# Patient Record
Sex: MALE | Race: WHITE | NOT HISPANIC OR LATINO | Employment: OTHER | URBAN - NONMETROPOLITAN AREA
[De-identification: names, ages, dates, MRNs, and addresses within clinical notes are randomized per-mention and may not be internally consistent; named-entity substitution may affect disease eponyms.]

---

## 2017-03-28 ENCOUNTER — TELEPHONE (OUTPATIENT)
Dept: CARDIOLOGY | Facility: CLINIC | Age: 57
End: 2017-03-28

## 2017-03-28 RX ORDER — SIMVASTATIN 40 MG
TABLET ORAL
Qty: 90 TABLET | Refills: 1 | Status: SHIPPED | OUTPATIENT
Start: 2017-03-28 | End: 2018-07-10 | Stop reason: SDUPTHER

## 2017-03-28 RX ORDER — CARVEDILOL 6.25 MG/1
6.25 TABLET ORAL 2 TIMES DAILY WITH MEALS
Qty: 30 TABLET | Refills: 6 | Status: SHIPPED | OUTPATIENT
Start: 2017-03-28 | End: 2018-07-10 | Stop reason: SDUPTHER

## 2017-03-28 NOTE — TELEPHONE ENCOUNTER
Received call from [facility] requesting refills on simvastatin 40 mg daily and coreg 6.25 mg bid, script sent into pharmacy.

## 2017-06-28 ENCOUNTER — OFFICE VISIT (OUTPATIENT)
Dept: CARDIOLOGY | Facility: CLINIC | Age: 57
End: 2017-06-28

## 2017-06-28 VITALS
DIASTOLIC BLOOD PRESSURE: 70 MMHG | HEART RATE: 68 BPM | WEIGHT: 197 LBS | SYSTOLIC BLOOD PRESSURE: 144 MMHG | BODY MASS INDEX: 31.66 KG/M2 | HEIGHT: 66 IN

## 2017-06-28 DIAGNOSIS — E78.00 HYPERCHOLESTEREMIA: ICD-10-CM

## 2017-06-28 DIAGNOSIS — I25.10 CORONARY ARTERY DISEASE INVOLVING NATIVE CORONARY ARTERY OF NATIVE HEART WITHOUT ANGINA PECTORIS: Primary | ICD-10-CM

## 2017-06-28 DIAGNOSIS — R01.1 MURMUR, CARDIAC: ICD-10-CM

## 2017-06-28 DIAGNOSIS — I10 ESSENTIAL HYPERTENSION: ICD-10-CM

## 2017-06-28 PROCEDURE — 99213 OFFICE O/P EST LOW 20 MIN: CPT | Performed by: NURSE PRACTITIONER

## 2017-06-28 NOTE — PROGRESS NOTES
Chief Complaint   Patient presents with   • Follow-up     6 month follow-up, labs and medication refills per PCP.       Subjective       Sawyer Tilley is a 57 y.o. male  with a history of ischemic heart disease for which he underwent bypass surgery in 2011. His last cardiac workup done in 2014 showed only a small inferior infarct.   Today he comes to the office for a follow up appointment and denies cardiac symptoms. He has been doing a lot of mowing and yard work without problem other than developed some left knee pain. He has issues with left leg feeling numb at times which is not a new symptom and attributes to back problems by history. No medication changes reported.     HPI         Cardiac History:    Past Surgical History:   Procedure Laterality Date   • CARDIOVASCULAR STRESS TEST  11/03/2011    Stress-4min, 41sec, 84%THR, 160/86. Inferior Ischemia   • CARDIOVASCULAR STRESS TEST  09/30/2014    [facility]-no ischemia, small fixed inferior infarct   • CATH LAB PROCEDURE  01/08/2011    Cath-60%LAD, 90%D1, 75%LCX, 80%OM. 100%RCA.   • CONVERTED (HISTORICAL) CARDIOVASCULAR STUDIES  01/10/2011    LIMA-LAD, SVG-OM, SVG-PDA, & PHYLLIS to Dr. Valdez   • CORONARY ARTERY BYPASS GRAFT  01/10/2011    LIMA-LAD, SVG-OM, SVG-PDA. PHYLLIS- D1   • ECHO - CONVERTED  11/03/2011    Echo-EF 65/70%   • ECHO - CONVERTED  09/30/2004    Echo-EF 65%, mild MR, borderline pulm HTN   • OTHER SURGICAL HISTORY  01/09/2011     Carotid US- No sig. stenosis.        Current Outpatient Prescriptions   Medication Sig Dispense Refill   • acetaminophen (TYLENOL) 500 MG tablet Take 500 mg by mouth every 6 (six) hours as needed for mild pain (1-3).     • aspirin 325 MG tablet Take 325 mg by mouth daily.     • carvedilol (COREG) 6.25 MG tablet Take 1 tablet by mouth 2 (Two) Times a Day With Meals. 30 tablet 6   • fluticasone (FLONASE) 50 MCG/ACT nasal spray 2 sprays into each nostril Daily.     • isosorbide mononitrate (IMDUR) 30 MG 24 hr tablet TAKE 1 TABLET  ONE TIME DAILY IN THE MORNING 90 tablet 3   • loratadine (CLARITIN) 10 MG tablet Take 10 mg by mouth Daily.     • omeprazole (PriLOSEC) 20 MG capsule TAKE 1 CAPSULE ONE TIME DAILY 90 capsule 0   • simvastatin (ZOCOR) 40 MG tablet One tablet by mouth daily 90 tablet 1     No current facility-administered medications for this visit.        Keflex [cephalexin]    Past Medical History:   Diagnosis Date   • Anemia     Acute blood loss Anemia   • Back pain     Chronic   • Dyslipidemia    • GERD (gastroesophageal reflux disease)    • H pylori ulcer     Positive   • Hypertension    • IHD (ischemic heart disease)     s/p CABG X 4-V   • Sleep apnea     wears CPAP   • Thrombocytopenia    • Vitamin D deficiency        Social History     Social History   • Marital status:      Spouse name: N/A   • Number of children: N/A   • Years of education: N/A     Occupational History   • Not on file.     Social History Main Topics   • Smoking status: Current Every Day Smoker     Packs/day: 0.50     Types: Cigarettes   • Smokeless tobacco: Never Used      Comment: patient reports has cut back on cigarette smoking, not ready to quit yet, counseled patient on effect's of smoking on health.   • Alcohol use No   • Drug use: No   • Sexual activity: Not on file     Other Topics Concern   • Not on file     Social History Narrative       Family History   Problem Relation Age of Onset   • Hypertension Mother    • Alzheimer's disease Mother    • Hypertension Father    • Hypertension Other        Review of Systems   Constitution: Negative for decreased appetite, diaphoresis, weakness and malaise/fatigue.   HENT: Positive for congestion (mild, relates to allergies). Negative for nosebleeds.    Eyes: Positive for blurred vision (reports a few episodes, lasting several minutes).   Cardiovascular: Negative for chest pain, claudication, dyspnea on exertion, leg swelling, near-syncope and palpitations.   Respiratory: Negative for shortness of breath  "and sleep disturbances due to breathing (uses CPAP).    Endocrine: Negative for polydipsia, polyphagia and polyuria.   Hematologic/Lymphatic: Negative for bleeding problem. Does not bruise/bleed easily.   Musculoskeletal: Positive for arthritis and back pain. Negative for muscle weakness.   Gastrointestinal: Positive for flatus. Negative for abdominal pain, dysphagia, heartburn, melena and nausea.   Genitourinary: Negative for dysuria and hematuria.   Neurological: Negative for difficulty with concentration and light-headedness.   Psychiatric/Behavioral: The patient does not have insomnia and is not nervous/anxious.         Diabetes- No  Thyroid-normal    Objective     /70 (BP Location: Left arm)  Pulse 68  Ht 66\" (167.6 cm)  Wt 197 lb (89.4 kg)  BMI 31.8 kg/m2    Physical Exam   Constitutional: He is oriented to person, place, and time.   HENT:   Head: Normocephalic.   Eyes: Conjunctivae are normal. Pupils are equal, round, and reactive to light.   Neck: Neck supple. No JVD present. No thyromegaly present.   Cardiovascular: Normal rate and regular rhythm.    Murmur heard.   Systolic murmur is present with a grade of 2/6   Pulses:       Radial pulses are 3+ on the right side, and 3+ on the left side.   Slightly loud S2   Pulmonary/Chest: Effort normal and breath sounds normal.   Abdominal: Soft. Bowel sounds are normal. He exhibits no distension. There is no tenderness. There is no guarding.   Musculoskeletal: He exhibits no edema.   Neurological: He is alert and oriented to person, place, and time.   Skin: Skin is warm and dry. No rash noted. Nails show no clubbing.   Psychiatric: He has a normal mood and affect. His behavior is normal. Judgment and thought content normal.   Vitals reviewed.     Procedures          Assessment/Plan      Sawyer was seen today for follow-up.    Diagnoses and all orders for this visit:    Coronary artery disease involving native coronary artery of native heart without angina " pectoris    Hypercholesteremia    Essential hypertension    Murmur, cardiac      Mr. Tilley will follow with you for management of labs and medication refills. December lab report showed slight increase in glucose for which he was advised to avoid high carbohydrate diet. His blood pressure and heart rate are stable. He denies cardiac chest pain or palpitations. He does admit to episode of mild epigastric RUQ tenderness and increase flatulence recently. If symptoms reoccur I advised him to follow up with you. His BMI remains > 30, weight loss recommended. No cardiac testing advised at this time. We will see him back in 6 months or sooner for problems.              Electronically signed by KATHLEEN Mustafa,  June 28, 2017 3:21 PM

## 2018-01-08 ENCOUNTER — OFFICE VISIT (OUTPATIENT)
Dept: CARDIOLOGY | Facility: CLINIC | Age: 58
End: 2018-01-08

## 2018-01-08 VITALS
WEIGHT: 203 LBS | HEIGHT: 66 IN | SYSTOLIC BLOOD PRESSURE: 150 MMHG | BODY MASS INDEX: 32.62 KG/M2 | HEART RATE: 60 BPM | DIASTOLIC BLOOD PRESSURE: 70 MMHG

## 2018-01-08 DIAGNOSIS — I10 ESSENTIAL HYPERTENSION: ICD-10-CM

## 2018-01-08 DIAGNOSIS — I25.10 CORONARY ARTERY DISEASE INVOLVING NATIVE CORONARY ARTERY OF NATIVE HEART WITHOUT ANGINA PECTORIS: Primary | ICD-10-CM

## 2018-01-08 DIAGNOSIS — Z72.0 TOBACCO ABUSE: ICD-10-CM

## 2018-01-08 DIAGNOSIS — E78.00 HYPERCHOLESTEREMIA: ICD-10-CM

## 2018-01-08 DIAGNOSIS — R01.1 MURMUR, CARDIAC: ICD-10-CM

## 2018-01-08 PROCEDURE — 99213 OFFICE O/P EST LOW 20 MIN: CPT | Performed by: NURSE PRACTITIONER

## 2018-01-08 NOTE — PROGRESS NOTES
Chief Complaint   Patient presents with   • Follow-up     For cardiac management. He states had one episode after mowing lawn and weed eating, he had nausea, vomited, had vision disturbance, and felt like needle in arms. He reports had not ate prior to working. He reports having joint tightness and pain, PCP stopped Zocor for 3-4 days and he has continued with pain in joints. PCP writes refills on medication. No recent labs. Patient verbalized current medication.       Cardiac Complaints  none      Subjective   Sawyer Tilley is a 57 y.o. male with HTN, hyperlipidemia, and  ischemic heart disease for which he underwent bypass surgery in 2011. His last cardiac workup done in 2014 showed only a small inferior infarct.  He returns today for follow up and reports one issue after mowing the lawn over the summer where he felt nauseous, vomited, and felt like he could not see.  He also reports that he had a sensation of needle pricks down his left arm.  Patient states before he was out in the heat that day he had not eaten and feels like it was a contributing factor.  He denies any repeat incidences since then.  He does report having joint tightness and pain and states he had stopped his zocor.  The pain did not go away and he restarted the medication.  No refills are currently needed as he reports with PCP.  No recent labs have been done.  Unfortunately, he has not been able to quit smoking and is still smoking about a pack of cigarettes daily.          Cardiac History  Past Surgical History:   Procedure Laterality Date   • CARDIOVASCULAR STRESS TEST  11/03/2011    Stress-4min, 41sec, 84%THR, 160/86. Inferior Ischemia   • CARDIOVASCULAR STRESS TEST  09/30/2014    [facility]-no ischemia, small fixed inferior infarct   • CATH LAB PROCEDURE  01/08/2011    Cath-60%LAD, 90%D1, 75%LCX, 80%OM. 100%RCA.   • CONVERTED (HISTORICAL) CARDIOVASCULAR STUDIES  01/10/2011    LIMA-LAD, SVG-OM, SVG-PDA, & PHYLLIS to Dr. Valdez   • CORONARY  ARTERY BYPASS GRAFT  01/10/2011    LIMA-LAD, SVG-OM, SVG-PDA. PHYLLIS- D1   • ECHO - CONVERTED  11/03/2011    Echo-EF 65/70%   • ECHO - CONVERTED  09/30/2004    Echo-EF 65%, mild MR, borderline pulm HTN   • OTHER SURGICAL HISTORY  01/09/2011     Carotid US- No sig. stenosis.        Current Outpatient Prescriptions   Medication Sig Dispense Refill   • acetaminophen (TYLENOL) 500 MG tablet Take 500 mg by mouth every 6 (six) hours as needed for mild pain (1-3).     • aspirin 325 MG tablet Take 325 mg by mouth daily.     • carvedilol (COREG) 6.25 MG tablet Take 1 tablet by mouth 2 (Two) Times a Day With Meals. 30 tablet 6   • fluticasone (FLONASE) 50 MCG/ACT nasal spray 2 sprays into each nostril Daily.     • isosorbide mononitrate (IMDUR) 30 MG 24 hr tablet TAKE 1 TABLET ONE TIME DAILY IN THE MORNING 90 tablet 3   • loratadine (CLARITIN) 10 MG tablet Take 10 mg by mouth Daily.     • omeprazole (PriLOSEC) 20 MG capsule TAKE 1 CAPSULE ONE TIME DAILY 90 capsule 0   • simvastatin (ZOCOR) 40 MG tablet One tablet by mouth daily 90 tablet 1     No current facility-administered medications for this visit.        Keflex [cephalexin]    Past Medical History:   Diagnosis Date   • Anemia     Acute blood loss Anemia   • Back pain     Chronic   • Dyslipidemia    • GERD (gastroesophageal reflux disease)    • H pylori ulcer     Positive   • Hypertension    • IHD (ischemic heart disease)     s/p CABG X 4-V   • Sleep apnea     wears CPAP   • Thrombocytopenia    • Vitamin D deficiency        Social History     Social History   • Marital status:      Spouse name: N/A   • Number of children: N/A   • Years of education: N/A     Occupational History   • Not on file.     Social History Main Topics   • Smoking status: Current Every Day Smoker     Packs/day: 1.00     Types: Cigarettes   • Smokeless tobacco: Never Used      Comment: patient reports has cut back on cigarette smoking, not ready to quit yet, counseled patient on effect's of  "smoking on health.   • Alcohol use No   • Drug use: No   • Sexual activity: Not on file     Other Topics Concern   • Not on file     Social History Narrative       Family History   Problem Relation Age of Onset   • Hypertension Mother    • Alzheimer's disease Mother    • Hypertension Father    • Alzheimer's disease Father    • Hypertension Other        Review of Systems   Constitution: Negative for weakness and malaise/fatigue.   Cardiovascular: Negative for chest pain, dyspnea on exertion, near-syncope, palpitations and syncope.   Respiratory: Negative for shortness of breath and wheezing.    Musculoskeletal: Positive for joint pain. Negative for joint swelling.   Gastrointestinal: Negative for anorexia and heartburn.   Genitourinary: Negative for dysuria, hematuria and nocturia.   Neurological: Negative for dizziness, light-headedness and loss of balance.   Psychiatric/Behavioral: Negative for altered mental status and depression.       DiabetesNo  Thyroidnormal    Objective     /70 (BP Location: Right arm)  Pulse 60  Ht 167.6 cm (65.98\")  Wt 92.1 kg (203 lb)  BMI 32.78 kg/m2    Physical Exam   Constitutional: He is oriented to person, place, and time. He appears well-developed and well-nourished.   HENT:   Head: Normocephalic and atraumatic.   Eyes: EOM are normal. Pupils are equal, round, and reactive to light.   Neck: Normal range of motion. Neck supple.   Cardiovascular: Normal rate and regular rhythm.    Murmur heard.  Pulmonary/Chest: Effort normal and breath sounds normal.   Abdominal: Soft.   Musculoskeletal: Normal range of motion.   Neurological: He is alert and oriented to person, place, and time.   Skin: Skin is warm and dry.   Psychiatric: He has a normal mood and affect. His behavior is normal.       Procedures    Assessment/Plan     HR is stable today.  BP is elevated at 150/70.  He reports at home it has been very well controlled.  He was advised to keep a log as he has a bleed pressure " machine at the house, if it remains elevated, he was advised to call for further recommendations.  He does admit to one episode of nausea, diaphoresis, and left arm tingling after mowing in the summer but reported it never returned, we did advise on repeat cardiac workup since these are anginal equivalents, but he declined as the symptoms only occurred once, and he related to lack of food.  If any symptoms should return, he was advised to call for further recommendations.  No recent labs have been done, order provided with recommendations to follow once complete.  No refills are needed.  Good cardiac diet and activity as tolerated advised.  Long discussion with him in regards to his smoking.  He talked about different modalities to quit and benefits of cessation for over 3 minutes. He currently does not want any medication and states he just is not ready yet to quit.  6 month follow up scheduled with patient encouraged to call with concerns.      Problems Addressed this Visit        Cardiovascular and Mediastinum    Coronary artery disease involving native coronary artery of native heart without angina pectoris - Primary    Relevant Orders    CBC & Differential    Comprehensive Metabolic Panel    Lipid Panel    TSH    Hypercholesteremia    Relevant Orders    CBC & Differential    Comprehensive Metabolic Panel    Lipid Panel    TSH    Essential hypertension    Relevant Orders    CBC & Differential    Comprehensive Metabolic Panel    Lipid Panel    TSH    Murmur, cardiac      Other Visit Diagnoses     Tobacco abuse                      Electronically signed by KATHLEEN Márquez January 8, 2018 4:23 PM

## 2018-01-12 ENCOUNTER — TELEPHONE (OUTPATIENT)
Dept: CARDIOLOGY | Facility: CLINIC | Age: 58
End: 2018-01-12

## 2018-01-12 RX ORDER — FENOFIBRATE 145 MG/1
145 TABLET, COATED ORAL DAILY
Qty: 90 TABLET | Refills: 3 | Status: SHIPPED | OUTPATIENT
Start: 2018-01-12 | End: 2018-07-10 | Stop reason: SDUPTHER

## 2018-01-12 NOTE — TELEPHONE ENCOUNTER
----- Message from Raven Valenzuela LPN sent at 1/11/2018  5:02 PM EST -----  Called, left message to call back with person who answered.

## 2018-01-12 NOTE — TELEPHONE ENCOUNTER
Patient notified of lab results and recommendation's for Tricor 145mg daily. Patient verbalizes understanding and request that script be sent into [facility]. Script sent.

## 2018-07-06 NOTE — PROGRESS NOTES
Chief Complaint   Patient presents with   • Follow-up     For cardiac management. Patient is on aspirin. Reports that he does sweat a lot, is wondering if it could be Imdur. Last lab work was done on 01/11/18 per you, in chart under media.    • Med Refill     Needs refills on cardiac medications. 90 day supplys to [facility] in [place].       Cardiac Complaints  none      Subjective   Sawyer Tilley is a 58 y.o. male with HTN, hyperlipidemia, and  ischemic heart disease for which he underwent bypass surgery in 2011. His last cardiac workup done in 2014 showed only a small inferior infarct.  At last visit, repeat cardiac workup was recommended for angina equivalent but patient declined as he states symptoms never returned after one time.  Patient comes today for follow up and denies cardiac concerns.  He does admit to diaphoresis that is present most days and all day.  He says with the increase of sweating he does not have chest pain, shortness of breath, or nausea associated with it.  No recent labs have been done, order requested.  Refills of cardiac meds requested as well. Unfortunately, he has not been able to quit smoking.      Cardiac History  Past Surgical History:   Procedure Laterality Date   • CARDIOVASCULAR STRESS TEST  11/03/2011    Stress-4min, 41sec, 84%THR, 160/86. Inferior Ischemia   • CARDIOVASCULAR STRESS TEST  09/30/2014    [facility]-no ischemia, small fixed inferior infarct   • CATH LAB PROCEDURE  01/08/2011    Cath-60%LAD, 90%D1, 75%LCX, 80%OM. 100%RCA.   • CONVERTED (HISTORICAL) CARDIOVASCULAR STUDIES  01/10/2011    LIMA-LAD, SVG-OM, SVG-PDA, & PHYLLIS to Dr. Valdez   • CORONARY ARTERY BYPASS GRAFT  01/10/2011    LIMA-LAD, SVG-OM, SVG-PDA. PHYLLIS- D1   • ECHO - CONVERTED  11/03/2011    Echo-EF 65/70%   • ECHO - CONVERTED  09/30/2004    Echo-EF 65%, mild MR, borderline pulm HTN   • OTHER SURGICAL HISTORY  01/09/2011     Carotid US- No sig. stenosis.        Current Outpatient Prescriptions   Medication  Sig Dispense Refill   • acetaminophen (TYLENOL) 500 MG tablet Take 500 mg by mouth every 6 (six) hours as needed for mild pain (1-3).     • aspirin 325 MG tablet Take 325 mg by mouth daily.     • carvedilol (COREG) 6.25 MG tablet Take 1 tablet by mouth 2 (Two) Times a Day With Meals. 180 tablet 3   • fenofibrate (TRICOR) 145 MG tablet Take 1 tablet by mouth Daily. 90 tablet 3   • fluticasone (FLONASE) 50 MCG/ACT nasal spray 2 sprays into each nostril Daily.     • isosorbide mononitrate (IMDUR) 30 MG 24 hr tablet Take 1 tablet by mouth Daily. 90 tablet 3   • loratadine (CLARITIN) 10 MG tablet Take 10 mg by mouth Daily.     • omeprazole (priLOSEC) 20 MG capsule Take 1 capsule by mouth Daily. 90 capsule 3   • simvastatin (ZOCOR) 40 MG tablet One tablet by mouth daily 90 tablet 3     No current facility-administered medications for this visit.        Keflex [cephalexin]    Past Medical History:   Diagnosis Date   • Anemia     Acute blood loss Anemia   • Back pain     Chronic   • Dyslipidemia    • GERD (gastroesophageal reflux disease)    • H pylori ulcer     Positive   • Hypertension    • IHD (ischemic heart disease)     s/p CABG X 4-V   • Sleep apnea     wears CPAP   • Thrombocytopenia (CMS/HCC)    • Vitamin D deficiency        Social History     Social History   • Marital status:      Spouse name: N/A   • Number of children: N/A   • Years of education: N/A     Occupational History   • Not on file.     Social History Main Topics   • Smoking status: Current Every Day Smoker     Packs/day: 1.00     Types: Cigarettes   • Smokeless tobacco: Never Used      Comment: patient reports has cut back on cigarette smoking, not ready to quit yet, counseled patient on effect's of smoking on health.   • Alcohol use No   • Drug use: No   • Sexual activity: Not on file     Other Topics Concern   • Not on file     Social History Narrative   • No narrative on file       Family History   Problem Relation Age of Onset   •  "Hypertension Mother    • Alzheimer's disease Mother    • Hypertension Father    • Alzheimer's disease Father    • Hypertension Other        Review of Systems   Constitution: Positive for diaphoresis. Negative for weakness and malaise/fatigue.   Cardiovascular: Negative for chest pain, dyspnea on exertion, irregular heartbeat, near-syncope, orthopnea, palpitations and syncope.   Respiratory: Negative for shortness of breath and wheezing.    Musculoskeletal: Negative for back pain, joint pain and joint swelling.   Gastrointestinal: Negative for anorexia, heartburn, nausea and vomiting.   Genitourinary: Negative for dysuria, hematuria, hesitancy and nocturia.   Neurological: Negative for dizziness, light-headedness and loss of balance.   Psychiatric/Behavioral: Negative for depression and memory loss. The patient is not nervous/anxious.            Objective     /78 (BP Location: Left arm)   Pulse 60   Ht 167.6 cm (65.98\")   Wt 93 kg (205 lb)   BMI 33.10 kg/m²     Physical Exam   Constitutional: He is oriented to person, place, and time. He appears well-developed and well-nourished.   HENT:   Head: Normocephalic and atraumatic.   Eyes: EOM are normal. Pupils are equal, round, and reactive to light.   Neck: Normal range of motion. Neck supple.   Cardiovascular: Normal rate and regular rhythm.    Murmur heard.  Pulmonary/Chest: Effort normal and breath sounds normal.   Abdominal: Soft.   Musculoskeletal: Normal range of motion.   Neurological: He is alert and oriented to person, place, and time.   Skin: Skin is warm and dry.   Psychiatric: He has a normal mood and affect.       Procedures    Assessment/Plan     HR and BP are stable today.  HTN well managed on current regimen.  For workup of diaphoresis, lab order will be provided.  FLP added to labs to assess for efficacy of statin medication for hyperlipidemia management along with tricor therapy. Chest xray will also be advised with his tobacco abuse to rule " out any possible malignancy.  More recommendations to follow.  No new cardiac workup will be advised at this time as he denies any cardiac concerns and is very active at home without problems. ASA continued for history of IHD. Smoking cessation once again discussed for continued tobacco abuse but he is not ready to quit at present.  He does report down the road he may stop due to costs.  BMI elevated at 33.10 good cardiac diet with limited caloric intake, saturated fat, and cholesterol advised.       Problems Addressed this Visit        Cardiovascular and Mediastinum    Coronary artery disease involving native coronary artery of native heart without angina pectoris    Relevant Medications    isosorbide mononitrate (IMDUR) 30 MG 24 hr tablet    carvedilol (COREG) 6.25 MG tablet    Other Relevant Orders    XR Chest 2 View    TSH    T4    T3    Comprehensive Metabolic Panel    CBC & Differential    Testosterone    Hypercholesteremia    Relevant Medications    simvastatin (ZOCOR) 40 MG tablet    fenofibrate (TRICOR) 145 MG tablet    Other Relevant Orders    TSH    T4    T3    Comprehensive Metabolic Panel    CBC & Differential    Testosterone      Other Visit Diagnoses     Diaphoresis    -  Primary    Relevant Orders    XR Chest 2 View    TSH    T4    T3    Comprehensive Metabolic Panel    CBC & Differential    Testosterone    Sedimentation Rate    CAD of autologous artery bypass graft without angina        Relevant Orders    XR Chest 2 View    TSH    T4    T3    Comprehensive Metabolic Panel    CBC & Differential    Testosterone    Other fatigue        Relevant Orders    TSH    T4    T3    Comprehensive Metabolic Panel    CBC & Differential    Testosterone    Sedimentation Rate    Tobacco abuse        Relevant Orders    XR Chest 2 View    Obesity (BMI 30.0-34.9)              Patient's Body mass index is 33.1 kg/m². BMI is above normal parameters. Recommendations include: nutrition counseling.      I advised Sawyer lama  the risks of continuing to use tobacco, and I provided him with tobacco cessation educational materials in the After Visit Summary.     During this visit, I spent over 3 minutes counseling the patient regarding tobacco cessation.            Electronically signed by KATHLEEN Márquez July 10, 2018 1:39 PM

## 2018-07-10 ENCOUNTER — OFFICE VISIT (OUTPATIENT)
Dept: CARDIOLOGY | Facility: CLINIC | Age: 58
End: 2018-07-10

## 2018-07-10 VITALS
DIASTOLIC BLOOD PRESSURE: 78 MMHG | WEIGHT: 205 LBS | HEART RATE: 60 BPM | BODY MASS INDEX: 32.95 KG/M2 | HEIGHT: 66 IN | SYSTOLIC BLOOD PRESSURE: 122 MMHG

## 2018-07-10 DIAGNOSIS — I25.810 CAD OF AUTOLOGOUS ARTERY BYPASS GRAFT WITHOUT ANGINA: ICD-10-CM

## 2018-07-10 DIAGNOSIS — Z72.0 TOBACCO ABUSE: ICD-10-CM

## 2018-07-10 DIAGNOSIS — E66.9 OBESITY (BMI 30.0-34.9): ICD-10-CM

## 2018-07-10 DIAGNOSIS — R53.83 OTHER FATIGUE: ICD-10-CM

## 2018-07-10 DIAGNOSIS — E78.00 HYPERCHOLESTEREMIA: ICD-10-CM

## 2018-07-10 DIAGNOSIS — I25.10 CORONARY ARTERY DISEASE INVOLVING NATIVE CORONARY ARTERY OF NATIVE HEART WITHOUT ANGINA PECTORIS: ICD-10-CM

## 2018-07-10 DIAGNOSIS — R61 DIAPHORESIS: Primary | ICD-10-CM

## 2018-07-10 PROCEDURE — 99213 OFFICE O/P EST LOW 20 MIN: CPT | Performed by: NURSE PRACTITIONER

## 2018-07-10 RX ORDER — OMEPRAZOLE 20 MG/1
20 CAPSULE, DELAYED RELEASE ORAL DAILY
Qty: 90 CAPSULE | Refills: 3 | Status: SHIPPED | OUTPATIENT
Start: 2018-07-10 | End: 2019-01-22

## 2018-07-10 RX ORDER — FENOFIBRATE 145 MG/1
145 TABLET, COATED ORAL DAILY
Qty: 90 TABLET | Refills: 3 | Status: SHIPPED | OUTPATIENT
Start: 2018-07-10 | End: 2019-04-18 | Stop reason: SDUPTHER

## 2018-07-10 RX ORDER — CARVEDILOL 6.25 MG/1
6.25 TABLET ORAL 2 TIMES DAILY WITH MEALS
Qty: 180 TABLET | Refills: 3 | Status: SHIPPED | OUTPATIENT
Start: 2018-07-10 | End: 2019-04-18 | Stop reason: SDUPTHER

## 2018-07-10 RX ORDER — SIMVASTATIN 40 MG
TABLET ORAL
Qty: 90 TABLET | Refills: 3 | Status: SHIPPED | OUTPATIENT
Start: 2018-07-10 | End: 2019-04-18 | Stop reason: SDUPTHER

## 2018-07-10 RX ORDER — ISOSORBIDE MONONITRATE 30 MG/1
30 TABLET, EXTENDED RELEASE ORAL DAILY
Qty: 90 TABLET | Refills: 3 | Status: SHIPPED | OUTPATIENT
Start: 2018-07-10 | End: 2019-04-18 | Stop reason: SDUPTHER

## 2018-07-15 ENCOUNTER — RESULTS ENCOUNTER (OUTPATIENT)
Dept: CARDIOLOGY | Facility: CLINIC | Age: 58
End: 2018-07-15

## 2018-07-15 DIAGNOSIS — R53.83 OTHER FATIGUE: ICD-10-CM

## 2018-07-15 DIAGNOSIS — R61 DIAPHORESIS: ICD-10-CM

## 2018-07-17 NOTE — PROGRESS NOTES
Labs look good. Tell him for the diaphoresis we can get cardiac workup if concerned as all labs normal and chest xray.  If willing to proceed.

## 2019-01-10 ENCOUNTER — OFFICE VISIT (OUTPATIENT)
Dept: CARDIOLOGY | Facility: CLINIC | Age: 59
End: 2019-01-10

## 2019-01-10 VITALS
BODY MASS INDEX: 33.11 KG/M2 | HEART RATE: 60 BPM | DIASTOLIC BLOOD PRESSURE: 62 MMHG | HEIGHT: 66 IN | WEIGHT: 206 LBS | SYSTOLIC BLOOD PRESSURE: 124 MMHG

## 2019-01-10 DIAGNOSIS — Z79.899 MEDICATION MANAGEMENT: ICD-10-CM

## 2019-01-10 DIAGNOSIS — I10 ESSENTIAL HYPERTENSION: ICD-10-CM

## 2019-01-10 DIAGNOSIS — Z95.1 HX OF CABG: ICD-10-CM

## 2019-01-10 DIAGNOSIS — I25.9 IHD (ISCHEMIC HEART DISEASE): Primary | ICD-10-CM

## 2019-01-10 DIAGNOSIS — R73.9 HYPERGLYCEMIA: ICD-10-CM

## 2019-01-10 DIAGNOSIS — E78.2 MIXED HYPERLIPIDEMIA: ICD-10-CM

## 2019-01-10 DIAGNOSIS — E78.00 HYPERCHOLESTEREMIA: ICD-10-CM

## 2019-01-10 PROCEDURE — 99213 OFFICE O/P EST LOW 20 MIN: CPT | Performed by: NURSE PRACTITIONER

## 2019-01-10 NOTE — PROGRESS NOTES
"Chief Complaint   Patient presents with   • Follow-up     cardiac management.  After last OV, he did hold Imdur to see if helped with the diaphoresis.  he said it did help the diahoresis, but he felt much better with taking the Imdur \"more energy\"  He restarted it.   • Med Refill     We verbally went over med list.  No refills needed at this time.   • Labs     Last labs, July 2018 in chart.       Subjective       Sawyer Tilley is a 58 y.o. male  with HTN, hyperlipidemia, and  ischemic heart disease for which he underwent bypass surgery in 2011. Cardiac workup in 2014 showed only a small inferior infarct. 7/13/18 CXR reported as negative for pulmonary disease. July 2018 labs: normal TSH and testosterone. BUN 25, creatinine 1.35, GFR 57. Glucose 118. For increased triglycerides, fenofibrate was added.     Today he comes to the office for a follow up visit and no cardiac complaints are voiced. He had stopped taking Imdur due to side effect of sweating, which resolved, but he had increase in fatigue therefore resumed Imdur. He denies chest pain, palpitations or shortness of breath. Unfortunately, he continues to smoke.      HPI     Cardiac History:    Past Surgical History:   Procedure Laterality Date   • CARDIOVASCULAR STRESS TEST  11/03/2011    Stress-4min, 41sec, 84%THR, 160/86. Inferior Ischemia   • CARDIOVASCULAR STRESS TEST  09/30/2014    [facility]-no ischemia, small fixed inferior infarct   • CATH LAB PROCEDURE  01/08/2011    Cath-60%LAD, 90%D1, 75%LCX, 80%OM. 100%RCA.   • CONVERTED (HISTORICAL) CARDIOVASCULAR STUDIES  01/10/2011    LIMA-LAD, SVG-OM, SVG-PDA, & PHYLLIS to Dr. Valdez   • CORONARY ARTERY BYPASS GRAFT  01/10/2011    LIMA-LAD, SVG-OM, SVG-PDA. PHYLLIS- D1   • ECHO - CONVERTED  11/03/2011    Echo-EF 65/70%   • ECHO - CONVERTED  09/30/2004    Echo-EF 65%, mild MR, borderline pulm HTN   • OTHER SURGICAL HISTORY  01/09/2011     Carotid US- No sig. stenosis.        Current Outpatient Medications   Medication Sig " Dispense Refill   • acetaminophen (TYLENOL) 500 MG tablet Take 500 mg by mouth every 6 (six) hours as needed for mild pain (1-3).     • aspirin 325 MG tablet Take 325 mg by mouth daily.     • carvedilol (COREG) 6.25 MG tablet Take 1 tablet by mouth 2 (Two) Times a Day With Meals. 180 tablet 3   • fenofibrate (TRICOR) 145 MG tablet Take 1 tablet by mouth Daily. 90 tablet 3   • fluticasone (FLONASE) 50 MCG/ACT nasal spray 2 sprays into the nostril(s) as directed by provider As Needed.     • isosorbide mononitrate (IMDUR) 30 MG 24 hr tablet Take 1 tablet by mouth Daily. 90 tablet 3   • loratadine (CLARITIN) 10 MG tablet Take 10 mg by mouth Daily.     • omeprazole (priLOSEC) 20 MG capsule Take 1 capsule by mouth Daily. 90 capsule 3   • simvastatin (ZOCOR) 40 MG tablet One tablet by mouth daily 90 tablet 3     No current facility-administered medications for this visit.        Keflex [cephalexin]    Past Medical History:   Diagnosis Date   • Anemia     Acute blood loss Anemia   • Back pain     Chronic   • Dyslipidemia    • GERD (gastroesophageal reflux disease)    • H pylori ulcer     Positive   • Hypertension    • IHD (ischemic heart disease)     s/p CABG X 4-V   • Sleep apnea     wears CPAP   • Thrombocytopenia (CMS/HCC)    • Vitamin D deficiency        Social History     Socioeconomic History   • Marital status:      Spouse name: Not on file   • Number of children: Not on file   • Years of education: Not on file   • Highest education level: Not on file   Social Needs   • Financial resource strain: Not on file   • Food insecurity - worry: Not on file   • Food insecurity - inability: Not on file   • Transportation needs - medical: Not on file   • Transportation needs - non-medical: Not on file   Occupational History   • Not on file   Tobacco Use   • Smoking status: Current Every Day Smoker     Packs/day: 1.00     Types: Cigarettes   • Smokeless tobacco: Never Used   • Tobacco comment: patient reports has cut back  "on cigarette smoking, not ready to quit yet, counseled patient on effect's of smoking on health.   Substance and Sexual Activity   • Alcohol use: No   • Drug use: No   • Sexual activity: Not on file   Other Topics Concern   • Not on file   Social History Narrative   • Not on file       Family History   Problem Relation Age of Onset   • Hypertension Mother    • Alzheimer's disease Mother    • Hypertension Father    • Alzheimer's disease Father    • Hypertension Other        Review of Systems   Constitution: Positive for diaphoresis (attributes to side effect of Imdur) and malaise/fatigue (improved with Imdur). Negative for decreased appetite and weakness.   HENT: Negative for congestion and hoarse voice.    Eyes: Negative for redness and visual disturbance.   Cardiovascular: Negative for chest pain, leg swelling, near-syncope and palpitations.   Respiratory: Positive for cough. Negative for shortness of breath and sputum production.    Endocrine: Negative for polydipsia, polyphagia and polyuria.   Hematologic/Lymphatic: Negative for bleeding problem. Does not bruise/bleed easily.   Skin: Negative for color change and itching.   Musculoskeletal: Negative for joint pain and myalgias.   Gastrointestinal: Negative for change in bowel habit, heartburn, melena and nausea.        Abdominal hernia by history   Genitourinary: Negative for dysuria and hematuria.   Neurological: Negative for dizziness and light-headedness.   Psychiatric/Behavioral: Negative for memory loss. The patient does not have insomnia and is not nervous/anxious.         Objective     /62 (BP Location: Right arm)   Pulse 60   Ht 167.6 cm (66\")   Wt 93.4 kg (206 lb)   BMI 33.25 kg/m²     Physical Exam   Constitutional: He is oriented to person, place, and time. Vital signs are normal. He appears well-nourished.   HENT:   Head: Normocephalic.   Eyes: Conjunctivae are normal. Pupils are equal, round, and reactive to light.   Neck: Normal range of " motion. Neck supple. Carotid bruit is not present.   Cardiovascular: Normal rate, regular rhythm, S1 normal and S2 normal.   No murmur heard.  Pulses:       Radial pulses are 2+ on the right side, and 2+ on the left side.   Pulmonary/Chest: Breath sounds normal. He has no wheezes. He has no rales.   Abdominal: Soft. Bowel sounds are normal. There is no tenderness.   Musculoskeletal: Normal range of motion. He exhibits no edema.   Neurological: He is alert and oriented to person, place, and time.   Skin: Skin is warm and dry. No pallor.   Psychiatric: He has a normal mood and affect. His behavior is normal.      Procedures: none today      Assessment/Plan      Sawyer was seen today for follow-up, med refill and labs.    Diagnoses and all orders for this visit:    IHD (ischemic heart disease)    Essential hypertension  -     Comprehensive Metabolic Panel; Future  -     CBC (No Diff); Future    Hypercholesteremia  -     Lipid Panel; Future    Hx of CABG    Hyperglycemia  -     Hemoglobin A1c; Future    Medication management  -     Comprehensive Metabolic Panel; Future  -     Hemoglobin A1c; Future  -     Lipid Panel; Future  -     CBC (No Diff); Future    Mixed hyperlipidemia  -     Hemoglobin A1c; Future  -     Lipid Panel; Future    His blood pressure, heart rate and rhythm today are normal. Mr. Tilley denies cardiac symptoms or concerns. We discussed repeat cardiac testing due to being 5 years this year since last stress and echo and risk including smoking. Since he has not had symptoms of concern and due to cost of test, he declines testing at this time.     A lab order given as noted above. Continue fenofibrate at this time. HA1C ordered to better assess for significance of increased glucose.     Patient's Body mass index is 33.25 kg/m². BMI is above normal parameters. Recommendations include: nutrition counseling.     I advised Sawyer of the risks of continuing to use tobacco, and I provided him with tobacco  cessation educational materials in the After Visit Summary.   During this visit, I spent < 3 minutes counseling the patient regarding tobacco cessation.     A 6 month follow up visit scheduled. Please call sooner for any cardiac concerns.            Electronically signed by KATHLEEN Mustafa,  January 10, 2019 2:27 PM

## 2019-01-15 ENCOUNTER — RESULTS ENCOUNTER (OUTPATIENT)
Dept: CARDIOLOGY | Facility: CLINIC | Age: 59
End: 2019-01-15

## 2019-01-15 DIAGNOSIS — Z79.899 MEDICATION MANAGEMENT: ICD-10-CM

## 2019-01-15 DIAGNOSIS — I10 ESSENTIAL HYPERTENSION: ICD-10-CM

## 2019-01-22 ENCOUNTER — TELEPHONE (OUTPATIENT)
Dept: CARDIOLOGY | Facility: CLINIC | Age: 59
End: 2019-01-22

## 2019-01-22 RX ORDER — RANITIDINE 150 MG/1
150 TABLET ORAL 2 TIMES DAILY
Qty: 60 TABLET | Refills: 8 | Status: SHIPPED | OUTPATIENT
Start: 2019-01-22 | End: 2019-07-11 | Stop reason: ALTCHOICE

## 2019-01-22 NOTE — TELEPHONE ENCOUNTER
Patient aware of lab results and recommendations.    He is ok with stopping Prilosec and changing to Zantac or Pepcid.  Which do you recommend and what dose and I will send to [facility].

## 2019-01-22 NOTE — TELEPHONE ENCOUNTER
----- Message from KATHLEEN Boswell sent at 1/21/2019 10:27 AM EST -----  Creatinine has increased over last few labs. Advise to stop Prilosec. Can try Zantac or Pepcid. Triglycerides remain increased. Continue fenofibrate and work on low fat diet.

## 2019-04-18 ENCOUNTER — TELEPHONE (OUTPATIENT)
Dept: CARDIOLOGY | Facility: CLINIC | Age: 59
End: 2019-04-18

## 2019-04-18 RX ORDER — SIMVASTATIN 40 MG
TABLET ORAL
Qty: 90 TABLET | Refills: 0 | Status: SHIPPED | OUTPATIENT
Start: 2019-04-18 | End: 2019-06-25 | Stop reason: SDUPTHER

## 2019-04-18 RX ORDER — ISOSORBIDE MONONITRATE 30 MG/1
30 TABLET, EXTENDED RELEASE ORAL DAILY
Qty: 90 TABLET | Refills: 0 | Status: SHIPPED | OUTPATIENT
Start: 2019-04-18 | End: 2019-06-25 | Stop reason: SDUPTHER

## 2019-04-18 RX ORDER — FENOFIBRATE 145 MG/1
145 TABLET, COATED ORAL DAILY
Qty: 90 TABLET | Refills: 0 | Status: SHIPPED | OUTPATIENT
Start: 2019-04-18 | End: 2019-06-25 | Stop reason: SDUPTHER

## 2019-04-18 RX ORDER — CARVEDILOL 6.25 MG/1
6.25 TABLET ORAL 2 TIMES DAILY WITH MEALS
Qty: 180 TABLET | Refills: 0 | Status: SHIPPED | OUTPATIENT
Start: 2019-04-18 | End: 2019-06-25 | Stop reason: SDUPTHER

## 2019-04-18 NOTE — TELEPHONE ENCOUNTER
Patient came to office requesting 90 day refill on Simvastatin 40mg daily, Imdur 30mg daily, Coreg 6.25mg bid, and Fenofibrate 145mg daily sent to [facility] Mail order. Refills sent to pharmacy.

## 2019-06-26 RX ORDER — ISOSORBIDE MONONITRATE 30 MG/1
TABLET, EXTENDED RELEASE ORAL
Qty: 90 TABLET | Refills: 0 | Status: SHIPPED | OUTPATIENT
Start: 2019-06-26 | End: 2019-07-14 | Stop reason: SDUPTHER

## 2019-06-26 RX ORDER — SIMVASTATIN 40 MG
TABLET ORAL
Qty: 90 TABLET | Refills: 0 | Status: SHIPPED | OUTPATIENT
Start: 2019-06-26 | End: 2019-07-14 | Stop reason: SDUPTHER

## 2019-06-26 RX ORDER — CARVEDILOL 6.25 MG/1
TABLET ORAL
Qty: 180 TABLET | Refills: 0 | Status: SHIPPED | OUTPATIENT
Start: 2019-06-26 | End: 2019-07-14 | Stop reason: SDUPTHER

## 2019-06-26 RX ORDER — FENOFIBRATE 145 MG/1
TABLET, COATED ORAL
Qty: 90 TABLET | Refills: 0 | Status: SHIPPED | OUTPATIENT
Start: 2019-06-26 | End: 2019-07-14 | Stop reason: SDUPTHER

## 2019-07-11 ENCOUNTER — OFFICE VISIT (OUTPATIENT)
Dept: CARDIOLOGY | Facility: CLINIC | Age: 59
End: 2019-07-11

## 2019-07-11 VITALS
HEIGHT: 66 IN | SYSTOLIC BLOOD PRESSURE: 122 MMHG | BODY MASS INDEX: 32.47 KG/M2 | DIASTOLIC BLOOD PRESSURE: 70 MMHG | WEIGHT: 202 LBS | HEART RATE: 60 BPM

## 2019-07-11 DIAGNOSIS — R35.1 NOCTURIA: ICD-10-CM

## 2019-07-11 DIAGNOSIS — I25.10 CORONARY ARTERY DISEASE INVOLVING NATIVE CORONARY ARTERY OF NATIVE HEART WITHOUT ANGINA PECTORIS: Primary | ICD-10-CM

## 2019-07-11 DIAGNOSIS — E78.00 HYPERCHOLESTEREMIA: ICD-10-CM

## 2019-07-11 DIAGNOSIS — R73.9 HYPERGLYCEMIA: ICD-10-CM

## 2019-07-11 DIAGNOSIS — R39.198 SLOW URINARY STREAM: ICD-10-CM

## 2019-07-11 DIAGNOSIS — I10 ESSENTIAL HYPERTENSION: ICD-10-CM

## 2019-07-11 PROCEDURE — 99214 OFFICE O/P EST MOD 30 MIN: CPT | Performed by: NURSE PRACTITIONER

## 2019-07-11 RX ORDER — OMEPRAZOLE 20 MG/1
20 CAPSULE, DELAYED RELEASE ORAL DAILY
COMMUNITY

## 2019-07-11 RX ORDER — VARENICLINE TARTRATE 1 MG/1
1 TABLET, FILM COATED ORAL 2 TIMES DAILY
Qty: 60 TABLET | Refills: 6 | Status: SHIPPED | OUTPATIENT
Start: 2019-07-11 | End: 2020-01-14 | Stop reason: ALTCHOICE

## 2019-07-11 RX ORDER — METHOCARBAMOL 750 MG/1
750 TABLET, FILM COATED ORAL 3 TIMES DAILY PRN
COMMUNITY
End: 2021-05-05

## 2019-07-11 NOTE — PROGRESS NOTES
Chief Complaint   Patient presents with   • Follow-up     Cardiac management. He reports 2-3 days after working out in the heat, he felt achy, with bone and muscle pain, that night he notice chills, he reports after he started sweating he felt better the next day.   • Dizziness     He reports the other day when working outside in the heat, dizziness with nausea/vomiting, he sit down and rest for 15-20 minutes and started feeling better.   • Med Refill     Needs refills on cardiac medication-90 day.       Subjective       Sawyer Tilley is a 59 y.o. male with a history of HTN, hyperlipidemia, and ischemic heart disease for which he underwent bypass surgery in 2011. Cardiac workup in 2014 showed only a small inferior infarct. He has been unsuccessful with smoking cessation attempts. CXR on 7/13/18 reported as negative for pulmonary disease. July 2018 labs: normal TSH and testosterone. BUN 25, creatinine 1.35, GFR 57. Glucose 118. For increased triglycerides, fenofibrate was added. He has declined repeat testing due to cost of stress test. He came today for follow up. He reports a couple of episodes recently where he felt weak, dizziness, achy all over during exertion. He did have nausea and vomiting during the most recent episode. He did not use sl nitro but symptoms resolved after resting 15-20 minutes. He does express interest in smoking cessation. Labs are typically ordered here then drawn with PCP office.    HPI         Cardiac History:    Past Surgical History:   Procedure Laterality Date   • CARDIOVASCULAR STRESS TEST  11/03/2011    Stress-4min, 41sec, 84%THR, 160/86. Inferior Ischemia   • CARDIOVASCULAR STRESS TEST  09/30/2014    [facility]-no ischemia, small fixed inferior infarct   • CATH LAB PROCEDURE  01/08/2011    Cath-60%LAD, 90%D1, 75%LCX, 80%OM. 100%RCA.   • CONVERTED (HISTORICAL) CARDIOVASCULAR STUDIES  01/10/2011    LIMA-LAD, SVG-OM, SVG-PDA, & PHYLLIS to Dr. Valdez   • CORONARY ARTERY BYPASS GRAFT   01/10/2011    LIMA-LAD, SVG-OM, SVG-PDA. PHYLLIS- D1   • ECHO - CONVERTED  11/03/2011    Echo-EF 65/70%   • ECHO - CONVERTED  09/30/2004    Echo-EF 65%, mild MR, borderline pulm HTN   • OTHER SURGICAL HISTORY  01/09/2011     Carotid US- No sig. stenosis.        Current Outpatient Medications   Medication Sig Dispense Refill   • acetaminophen (TYLENOL) 500 MG tablet Take 500 mg by mouth every 6 (six) hours as needed for mild pain (1-3).     • aspirin 325 MG tablet Take 325 mg by mouth daily.     • carvedilol (COREG) 6.25 MG tablet TAKE 1 TABLET TWICE DAILY WITH MEALS 180 tablet 0   • fenofibrate (TRICOR) 145 MG tablet TAKE 1 TABLET EVERY DAY 90 tablet 0   • fluticasone (FLONASE) 50 MCG/ACT nasal spray 2 sprays into the nostril(s) as directed by provider As Needed.     • isosorbide mononitrate (IMDUR) 30 MG 24 hr tablet TAKE 1 TABLET EVERY DAY 90 tablet 0   • loratadine (CLARITIN) 10 MG tablet Take 10 mg by mouth Daily.     • methocarbamol (ROBAXIN) 750 MG tablet Take 750 mg by mouth 3 (Three) Times a Day As Needed for Muscle Spasms.     • omeprazole (priLOSEC) 20 MG capsule Take 20 mg by mouth Daily.     • simvastatin (ZOCOR) 40 MG tablet TAKE 1 TABLET EVERY DAY (NEED LABS FOR ANY FURTHER REFILLS) 90 tablet 0   • varenicline (CHANTIX CONTINUING MONTH DELMI) 1 MG tablet Take 1 tablet by mouth 2 (Two) Times a Day. 60 tablet 6   • varenicline (CHANTIX STARTING MONTH DELMI) 0.5 MG X 11 & 1 MG X 42 tablet Take 0.5 mg one daily on days 1-3 and and 0.5 mg twice daily on days 4-7.Then 1 mg twice daily for a total of 12 weeks. 11 tablet 0     No current facility-administered medications for this visit.      Keflex [cephalexin]    Past Medical History:   Diagnosis Date   • Anemia     Acute blood loss Anemia   • Back pain     Chronic   • Dyslipidemia    • GERD (gastroesophageal reflux disease)    • H pylori ulcer     Positive   • Hypertension    • IHD (ischemic heart disease)     s/p CABG X 4-V   • Sleep apnea     wears CPAP   •  "Thrombocytopenia (CMS/HCC)    • Vitamin D deficiency      Social History     Socioeconomic History   • Marital status:      Spouse name: Not on file   • Number of children: Not on file   • Years of education: Not on file   • Highest education level: Not on file   Tobacco Use   • Smoking status: Current Every Day Smoker     Packs/day: 1.00     Types: Cigarettes   • Smokeless tobacco: Never Used   • Tobacco comment: patient reports has cut back on cigarette smoking, not ready to quit yet, counseled patient on effect's of smoking on health.   Substance and Sexual Activity   • Alcohol use: No   • Drug use: No     Family History   Problem Relation Age of Onset   • Hypertension Mother    • Alzheimer's disease Mother    • Hypertension Father    • Alzheimer's disease Father    • Hypertension Other      Review of Systems   Constitution: Positive for weakness, malaise/fatigue and weight loss (down 4 lb ). Negative for decreased appetite.   HENT: Negative.    Eyes: Negative.    Cardiovascular: Positive for chest pain and dyspnea on exertion. Negative for leg swelling, palpitations and syncope.   Respiratory: Positive for shortness of breath. Negative for cough.    Endocrine: Negative.    Hematologic/Lymphatic: Negative.    Skin: Negative.    Musculoskeletal: Negative for myalgias.   Gastrointestinal: Positive for nausea. Negative for abdominal pain and melena.   Genitourinary: Negative for hematuria.   Neurological: Negative for dizziness.   Psychiatric/Behavioral: Negative for altered mental status and depression.   Allergic/Immunologic: Negative.       Diabetes- No  Thyroid-normal    Objective     /70 (BP Location: Left arm)   Pulse 60   Ht 167.6 cm (65.98\")   Wt 91.6 kg (202 lb)   BMI 32.62 kg/m²     Physical Exam   Constitutional: He is oriented to person, place, and time. He appears well-developed and well-nourished. No distress.   HENT:   Head: Normocephalic and atraumatic.   Eyes: Pupils are equal, " round, and reactive to light.   Neck: Normal range of motion. Neck supple.   Cardiovascular: Normal rate, regular rhythm and intact distal pulses.   Murmur heard.  Pulmonary/Chest: Effort normal and breath sounds normal. No respiratory distress.   Abdominal: Soft. Bowel sounds are normal. He exhibits no distension.   Musculoskeletal: Normal range of motion.   Neurological: He is alert and oriented to person, place, and time.   Skin: Skin is warm and dry. He is not diaphoretic.   Psychiatric: He has a normal mood and affect.   Nursing note and vitals reviewed.     Procedures          Assessment/Plan    Heart rate and blood pressure are normal. No changes made to his medications. Continue aspirin, beta blocker, long acting nitrates. He was given a lab order to check CBC, CMP, lipids, TSH, PSA, and A1C as last glucose was elevated. His last cardiac work up was five years ago. He has had symptoms suggestive of angina. He is willing to undergo stress test and echocardiogram to evaluate coronary perfusion, LV function, and MR. Continue simvastatin and fenofibrate for lipid management. He is willing to try Chantix for smoking cessation. Script sent to his pharmacy with detailed instruction for use. Further recommendations to follow labs, stress, and echo. We will see him back in six months for follow up or sooner if needed.   Sawyer was seen today for follow-up, dizziness and med refill.    Diagnoses and all orders for this visit:    Coronary artery disease involving native coronary artery of native heart without angina pectoris  -     CBC (No Diff); Future  -     Comprehensive Metabolic Panel; Future  -     Lipid Panel; Future  -     TSH; Future  -     Stress Test With Myocardial Perfusion One Day; Future  -     Adult Transthoracic Echo Complete W/ Cont if Necessary Per Protocol; Future  -     CBC (No Diff)  -     Comprehensive Metabolic Panel  -     Lipid Panel    Essential hypertension  -     CBC (No Diff); Future  -      Comprehensive Metabolic Panel; Future  -     TSH; Future  -     Stress Test With Myocardial Perfusion One Day; Future  -     Adult Transthoracic Echo Complete W/ Cont if Necessary Per Protocol; Future  -     CBC (No Diff)  -     Comprehensive Metabolic Panel    Hypercholesteremia  -     CBC (No Diff); Future  -     Comprehensive Metabolic Panel; Future  -     Lipid Panel; Future  -     TSH; Future  -     Stress Test With Myocardial Perfusion One Day; Future  -     Adult Transthoracic Echo Complete W/ Cont if Necessary Per Protocol; Future  -     CBC (No Diff)  -     Comprehensive Metabolic Panel  -     Lipid Panel    Hyperglycemia  -     Hemoglobin A1c; Future  -     Stress Test With Myocardial Perfusion One Day; Future  -     Adult Transthoracic Echo Complete W/ Cont if Necessary Per Protocol; Future  -     Hemoglobin A1c    Nocturia  -     PSA DIAGNOSTIC; Future    Slow urinary stream  -     PSA DIAGNOSTIC; Future    Other orders  -     varenicline (CHANTIX STARTING MONTH DELMI) 0.5 MG X 11 & 1 MG X 42 tablet; Take 0.5 mg one daily on days 1-3 and and 0.5 mg twice daily on days 4-7.Then 1 mg twice daily for a total of 12 weeks.  -     varenicline (CHANTIX CONTINUING MONTH DELMI) 1 MG tablet; Take 1 tablet by mouth 2 (Two) Times a Day.        Patient's Body mass index is 32.62 kg/m². BMI is above normal parameters. Recommendations include: nutrition counseling.                 Electronically signed by KATHLEEN Purcell,  July 14, 2019 9:24 PM

## 2019-07-11 NOTE — PATIENT INSTRUCTIONS
Mediterranean Diet  A Mediterranean diet refers to food and lifestyle choices that are based on the traditions of countries located on the [place]. This way of eating has been shown to help prevent certain conditions and improve outcomes for people who have chronic diseases, like kidney disease and heart disease.  What are tips for following this plan?  Lifestyle  · Cook and eat meals together with your family, when possible.  · Drink enough fluid to keep your urine clear or pale yellow.  · Be physically active every day. This includes:  ? Aerobic exercise like running or swimming.  ? Leisure activities like gardening, walking, or housework.  · Get 7-8 hours of sleep each night.  · If recommended by your health care provider, drink red wine in moderation. This means 1 glass a day for nonpregnant women and 2 glasses a day for men. A glass of wine equals 5 oz (150 mL).  Reading food labels  · Check the serving size of packaged foods. For foods such as rice and pasta, the serving size refers to the amount of cooked product, not dry.  · Check the total fat in packaged foods. Avoid foods that have saturated fat or trans fats.  · Check the ingredients list for added sugars, such as corn syrup.  Shopping  · At the grocery store, buy most of your food from the areas near the walls of the store. This includes:  ? Fresh fruits and vegetables (produce).  ? Grains, beans, nuts, and seeds. Some of these may be available in unpackaged forms or large amounts (in bulk).  ? Fresh seafood.  ? Poultry and eggs.  ? Low-fat dairy products.  · Buy whole ingredients instead of prepackaged foods.  · Buy fresh fruits and vegetables in-season from local farmers markets.  · Buy frozen fruits and vegetables in resealable bags.  · If you do not have access to quality fresh seafood, buy precooked frozen shrimp or canned fish, such as tuna, salmon, or sardines.  · Buy small amounts of raw or cooked vegetables, salads, or olives from the  deli or salad bar at your store.  · Stock your pantry so you always have certain foods on hand, such as olive oil, canned tuna, canned tomatoes, rice, pasta, and beans.  Cooking  · Cook foods with extra-virgin olive oil instead of using butter or other vegetable oils.  · Have meat as a side dish, and have vegetables or grains as your main dish. This means having meat in small portions or adding small amounts of meat to foods like pasta or stew.  · Use beans or vegetables instead of meat in common dishes like chili or lasagna.  · [place] with different cooking methods. Try roasting or broiling vegetables instead of steaming or sautéeing them.  · Add frozen vegetables to soups, stews, pasta, or rice.  · Add nuts or seeds for added healthy fat at each meal. You can add these to yogurt, salads, or vegetable dishes.  · Marinate fish or vegetables using olive oil, lemon juice, garlic, and fresh herbs.  Meal planning  · Plan to eat 1 vegetarian meal one day each week. Try to work up to 2 vegetarian meals, if possible.  · Eat seafood 2 or more times a week.  · Have healthy snacks readily available, such as:  ? Vegetable sticks with hummus.  ? Greek yogurt.  ? Fruit and nut trail mix.  · Eat balanced meals throughout the week. This includes:  ? Fruit: 2-3 servings a day  ? Vegetables: 4-5 servings a day  ? Low-fat dairy: 2 servings a day  ? Fish, poultry, or lean meat: 1 serving a day  ? Beans and legumes: 2 or more servings a week  ? Nuts and seeds: 1-2 servings a day  ? Whole grains: 6-8 servings a day  ? Extra-virgin olive oil: 3-4 servings a day  · Limit red meat and sweets to only a few servings a month  What are my food choices?  · Mediterranean diet  ? Recommended  ? Grains: Whole-grain pasta. Brown rice. Bulgar wheat. Polenta. Couscous. Whole-wheat bread. Oatmeal. Quinoa.  ? Vegetables: Artichokes. Beets. Broccoli. Cabbage. Carrots. Eggplant. Green beans. Chard. Kale. Spinach. Onions. Leeks. Peas. Squash.  Tomatoes. Peppers. Radishes.  ? Fruits: Apples. Apricots. Avocado. Berries. Bananas. Cherries. Dates. Figs. Grapes. [place]. Melon. Oranges. Peaches. Plums. Pomegranate.  ? Meats and other protein foods: Beans. Almonds. Sunflower seeds. Pine nuts. Peanuts. Cod. [place]. Scallops. Shrimp. Tuna. Tilapia. Clams. Oysters. Eggs.  ? Dairy: Low-fat milk. Cheese. Greek yogurt.  ? Beverages: Water. Red wine. Herbal tea.  ? Fats and oils: Extra virgin olive oil. Avocado oil. Grape seed oil.  ? Sweets and desserts: Greek yogurt with honey. Baked apples. Poached pears. Trail mix.  ? Seasoning and other foods: Basil. Cilantro. Coriander. Cumin. Mint. Parsley. [place]. Rosemary. Tarragon. Garlic. Oregano. Thyme. Pepper. Balsalmic vinegar. Tahini. Hummus. Tomato sauce. Olives. Mushrooms.  ? Limit these  ? Grains: Prepackaged pasta or rice dishes. Prepackaged cereal with added sugar.  ? Vegetables: Deep fried potatoes (french fries).  ? Fruits: Fruit canned in syrup.  ? Meats and other protein foods: Beef. Pork. Lamb. Poultry with skin. Hot dogs. [place].  ? Dairy: Ice cream. Sour cream. Whole milk.  ? Beverages: Juice. Sugar-sweetened soft drinks. Beer. Liquor and spirits.  ? Fats and oils: Butter. Canola oil. Vegetable oil. Beef fat (tallow). Lard.  ? Sweets and desserts: Cookies. Cakes. Pies. Candy.  ? Seasoning and other foods: Mayonnaise. Premade sauces and marinades.  ? The items listed may not be a complete list. Talk with your dietitian about what dietary choices are right for you.  Summary  · The Mediterranean diet includes both food and lifestyle choices.  · Eat a variety of fresh fruits and vegetables, beans, nuts, seeds, and whole grains.  · Limit the amount of red meat and sweets that you eat.  · Talk with your health care provider about whether it is safe for you to drink red wine in moderation. This means 1 glass a day for nonpregnant women and 2 glasses a day for men. A glass of wine equals 5 oz (150 mL).  This information  is not intended to replace advice given to you by your health care provider. Make sure you discuss any questions you have with your health care provider.  Document Released: 08/10/2017 Document Revised: 09/12/2017 Document Reviewed: 08/10/2017  [employer] Interactive Patient Education © 2019 [employer]

## 2019-07-14 RX ORDER — SIMVASTATIN 40 MG
TABLET ORAL
Qty: 90 TABLET | Refills: 3 | Status: SHIPPED | OUTPATIENT
Start: 2019-07-14 | End: 2019-07-19 | Stop reason: ALTCHOICE

## 2019-07-14 RX ORDER — CARVEDILOL 6.25 MG/1
6.25 TABLET ORAL 2 TIMES DAILY WITH MEALS
Qty: 180 TABLET | Refills: 3 | Status: SHIPPED | OUTPATIENT
Start: 2019-07-14 | End: 2020-04-28

## 2019-07-14 RX ORDER — ISOSORBIDE MONONITRATE 30 MG/1
30 TABLET, EXTENDED RELEASE ORAL DAILY
Qty: 90 TABLET | Refills: 3 | Status: SHIPPED | OUTPATIENT
Start: 2019-07-14 | End: 2019-07-24 | Stop reason: DRUGHIGH

## 2019-07-14 RX ORDER — FENOFIBRATE 145 MG/1
145 TABLET, COATED ORAL DAILY
Qty: 90 TABLET | Refills: 3 | Status: SHIPPED | OUTPATIENT
Start: 2019-07-14 | End: 2020-04-28

## 2019-07-19 ENCOUNTER — TELEPHONE (OUTPATIENT)
Dept: CARDIOLOGY | Facility: CLINIC | Age: 59
End: 2019-07-19

## 2019-07-19 RX ORDER — ATORVASTATIN CALCIUM 20 MG/1
20 TABLET, FILM COATED ORAL NIGHTLY
Qty: 90 TABLET | Refills: 2 | Status: SHIPPED | OUTPATIENT
Start: 2019-07-19 | End: 2020-01-14 | Stop reason: SDUPTHER

## 2019-07-19 NOTE — TELEPHONE ENCOUNTER
Patient aware of results and recommendations, patient request script be sent to [facility] mail order. Script for Atorvastatin 20mg nightly sent to pharmacy.

## 2019-07-19 NOTE — TELEPHONE ENCOUNTER
----- Message from KATHLEEN Purcell sent at 7/18/2019  5:12 PM EDT -----  PSA is normal  Thyroid is normal  CBC normal  Glucose remains borderline. He is prediabetic with A1C 6%.  Watch diet.     Lipids are out of balance with LDL too high and HDL too low.   Triglycerides are borderline elevated.     Would he be willing to change simvastatin to Lipitor 20 mg? Continue fenofibrate.

## 2019-07-22 ENCOUNTER — HOSPITAL ENCOUNTER (OUTPATIENT)
Dept: CARDIOLOGY | Facility: HOSPITAL | Age: 59
Discharge: HOME | End: 2019-07-22

## 2019-07-22 DIAGNOSIS — E78.00 HYPERCHOLESTEREMIA: ICD-10-CM

## 2019-07-22 DIAGNOSIS — I10 ESSENTIAL HYPERTENSION: ICD-10-CM

## 2019-07-22 DIAGNOSIS — I25.10 CORONARY ARTERY DISEASE INVOLVING NATIVE CORONARY ARTERY OF NATIVE HEART WITHOUT ANGINA PECTORIS: ICD-10-CM

## 2019-07-22 DIAGNOSIS — R73.9 HYPERGLYCEMIA: ICD-10-CM

## 2019-07-22 LAB
BH CV ECHO MEAS - ACS: 2.4 CM
BH CV ECHO MEAS - AO MEAN PG (FULL): 0.72 MMHG
BH CV ECHO MEAS - AO MEAN PG: 5.2 MMHG
BH CV ECHO MEAS - AO ROOT AREA (BSA CORRECTED): 1.6
BH CV ECHO MEAS - AO ROOT AREA: 8.1 CM^2
BH CV ECHO MEAS - AO ROOT DIAM: 3.2 CM
BH CV ECHO MEAS - AO V2 MEAN: 107.3 CM/SEC
BH CV ECHO MEAS - AO V2 VTI: 33.7 CM
BH CV ECHO MEAS - AVA(I,A): 3.1 CM^2
BH CV ECHO MEAS - AVA(I,D): 3.1 CM^2
BH CV ECHO MEAS - BSA(HAYCOCK): 2.1 M^2
BH CV ECHO MEAS - BSA: 2 M^2
BH CV ECHO MEAS - BZI_BMI: 33.6 KILOGRAMS/M^2
BH CV ECHO MEAS - BZI_METRIC_HEIGHT: 165.1 CM
BH CV ECHO MEAS - BZI_METRIC_WEIGHT: 91.6 KG
BH CV ECHO MEAS - EDV(CUBED): 94.6 ML
BH CV ECHO MEAS - EDV(MOD-SP4): 82 ML
BH CV ECHO MEAS - EDV(TEICH): 95.2 ML
BH CV ECHO MEAS - EF(CUBED): 92.6 %
BH CV ECHO MEAS - EF(MOD-SP4): 64.6 %
BH CV ECHO MEAS - EF(TEICH): 88.1 %
BH CV ECHO MEAS - ESV(CUBED): 7 ML
BH CV ECHO MEAS - ESV(MOD-SP4): 29 ML
BH CV ECHO MEAS - ESV(TEICH): 11.3 ML
BH CV ECHO MEAS - FS: 58.1 %
BH CV ECHO MEAS - IVS/LVPW: 0.96
BH CV ECHO MEAS - IVSD: 1.5 CM
BH CV ECHO MEAS - LA DIMENSION: 4.5 CM
BH CV ECHO MEAS - LA/AO: 1.4
BH CV ECHO MEAS - LV DIASTOLIC VOL/BSA (35-75): 41.3 ML/M^2
BH CV ECHO MEAS - LV IVRT: 0.1 SEC
BH CV ECHO MEAS - LV MASS(C)D: 287.7 GRAMS
BH CV ECHO MEAS - LV MASS(C)DI: 144.8 GRAMS/M^2
BH CV ECHO MEAS - LV MEAN PG: 4.5 MMHG
BH CV ECHO MEAS - LV SYSTOLIC VOL/BSA (12-30): 14.6 ML/M^2
BH CV ECHO MEAS - LV V1 MEAN: 97.1 CM/SEC
BH CV ECHO MEAS - LV V1 VTI: 32 CM
BH CV ECHO MEAS - LVIDD: 4.6 CM
BH CV ECHO MEAS - LVIDS: 1.9 CM
BH CV ECHO MEAS - LVLD AP4: 7.1 CM
BH CV ECHO MEAS - LVLS AP4: 6.1 CM
BH CV ECHO MEAS - LVOT AREA (M): 3.1 CM^2
BH CV ECHO MEAS - LVOT AREA: 3.2 CM^2
BH CV ECHO MEAS - LVOT DIAM: 2 CM
BH CV ECHO MEAS - LVPWD: 1.6 CM
BH CV ECHO MEAS - MV A MAX VEL: 55.3 CM/SEC
BH CV ECHO MEAS - MV DEC SLOPE: 363.3 CM/SEC^2
BH CV ECHO MEAS - MV E MAX VEL: 86.4 CM/SEC
BH CV ECHO MEAS - MV E/A: 1.6
BH CV ECHO MEAS - RVDD: 3.3 CM
BH CV ECHO MEAS - SI(AO): 137.6 ML/M^2
BH CV ECHO MEAS - SI(CUBED): 44.1 ML/M^2
BH CV ECHO MEAS - SI(LVOT): 51.8 ML/M^2
BH CV ECHO MEAS - SI(MOD-SP4): 26.7 ML/M^2
BH CV ECHO MEAS - SI(TEICH): 42.2 ML/M^2
BH CV ECHO MEAS - SV(AO): 273.3 ML
BH CV ECHO MEAS - SV(CUBED): 87.6 ML
BH CV ECHO MEAS - SV(LVOT): 102.9 ML
BH CV ECHO MEAS - SV(MOD-SP4): 53 ML
BH CV ECHO MEAS - SV(TEICH): 83.8 ML
BH CV NUCLEAR PRIOR STUDY: 3
BH CV STRESS COMMENTS STAGE 1: NORMAL
BH CV STRESS DOSE REGADENOSON STAGE 1: 0.4
BH CV STRESS DURATION MIN STAGE 1: 0
BH CV STRESS DURATION SEC STAGE 1: 10
BH CV STRESS PROTOCOL 1: NORMAL
BH CV STRESS RECOVERY BP: NORMAL MMHG
BH CV STRESS RECOVERY HR: 63 BPM
BH CV STRESS STAGE 1: 1
LV EF NUC BP: 57 %
MAXIMAL PREDICTED HEART RATE: 159 BPM
MAXIMAL PREDICTED HEART RATE: 159 BPM
PERCENT MAX PREDICTED HR: 39.62 %
STRESS BASELINE BP: NORMAL MMHG
STRESS BASELINE HR: 54 BPM
STRESS PERCENT HR: 47 %
STRESS POST PEAK BP: NORMAL MMHG
STRESS POST PEAK HR: 63 BPM
STRESS TARGET HR: 135 BPM
STRESS TARGET HR: 135 BPM

## 2019-07-22 PROCEDURE — 0 TECHNETIUM SESTAMIBI: Performed by: INTERNAL MEDICINE

## 2019-07-22 PROCEDURE — A9500 TC99M SESTAMIBI: HCPCS | Performed by: INTERNAL MEDICINE

## 2019-07-22 PROCEDURE — 93306 TTE W/DOPPLER COMPLETE: CPT

## 2019-07-22 PROCEDURE — 93017 CV STRESS TEST TRACING ONLY: CPT

## 2019-07-22 PROCEDURE — 93018 CV STRESS TEST I&R ONLY: CPT | Performed by: INTERNAL MEDICINE

## 2019-07-22 PROCEDURE — 25010000002 REGADENOSON 0.4 MG/5ML SOLUTION: Performed by: INTERNAL MEDICINE

## 2019-07-22 PROCEDURE — 78452 HT MUSCLE IMAGE SPECT MULT: CPT | Performed by: INTERNAL MEDICINE

## 2019-07-22 PROCEDURE — 78452 HT MUSCLE IMAGE SPECT MULT: CPT

## 2019-07-22 PROCEDURE — 93306 TTE W/DOPPLER COMPLETE: CPT | Performed by: INTERNAL MEDICINE

## 2019-07-22 RX ADMIN — TECHNETIUM TC 99M SESTAMIBI 1 DOSE: 1 INJECTION INTRAVENOUS at 08:17

## 2019-07-22 RX ADMIN — TECHNETIUM TC 99M SESTAMIBI 1 DOSE: 1 INJECTION INTRAVENOUS at 10:15

## 2019-07-22 RX ADMIN — REGADENOSON 0.4 MG: 0.08 INJECTION, SOLUTION INTRAVENOUS at 10:15

## 2019-07-24 ENCOUNTER — TELEPHONE (OUTPATIENT)
Dept: CARDIOLOGY | Facility: CLINIC | Age: 59
End: 2019-07-24

## 2019-07-24 RX ORDER — ISOSORBIDE MONONITRATE 60 MG/1
TABLET, EXTENDED RELEASE ORAL
Qty: 90 TABLET | Refills: 3 | Status: SHIPPED | OUTPATIENT
Start: 2019-07-24 | End: 2020-01-14 | Stop reason: SDUPTHER

## 2019-07-24 NOTE — TELEPHONE ENCOUNTER
I spoke with him and made him aware to try to take Imdur at night  To prevent headaches . He states undersatanding

## 2019-07-24 NOTE — TELEPHONE ENCOUNTER
I spoke with patient about stress test results and for recommendations to increase Imdur to 60 mg daily. He said the 30 mg causes him horrible headaches . He said he would try them at 60 mg and see how headache does.

## 2020-01-14 ENCOUNTER — OFFICE VISIT (OUTPATIENT)
Dept: CARDIOLOGY | Facility: CLINIC | Age: 60
End: 2020-01-14

## 2020-01-14 VITALS
WEIGHT: 211 LBS | BODY MASS INDEX: 33.91 KG/M2 | HEIGHT: 66 IN | HEART RATE: 76 BPM | DIASTOLIC BLOOD PRESSURE: 78 MMHG | SYSTOLIC BLOOD PRESSURE: 162 MMHG

## 2020-01-14 DIAGNOSIS — I10 ESSENTIAL HYPERTENSION: ICD-10-CM

## 2020-01-14 DIAGNOSIS — Z79.899 MEDICATION MANAGEMENT: ICD-10-CM

## 2020-01-14 DIAGNOSIS — Z87.891 FORMER SMOKER: ICD-10-CM

## 2020-01-14 DIAGNOSIS — R01.1 MURMUR, CARDIAC: ICD-10-CM

## 2020-01-14 DIAGNOSIS — F41.9 ANXIETY: ICD-10-CM

## 2020-01-14 DIAGNOSIS — Z91.09 ENVIRONMENTAL ALLERGIES: ICD-10-CM

## 2020-01-14 DIAGNOSIS — I25.10 CORONARY ARTERY DISEASE INVOLVING NATIVE CORONARY ARTERY OF NATIVE HEART WITHOUT ANGINA PECTORIS: Primary | ICD-10-CM

## 2020-01-14 DIAGNOSIS — E78.00 HYPERCHOLESTEREMIA: ICD-10-CM

## 2020-01-14 PROCEDURE — 99214 OFFICE O/P EST MOD 30 MIN: CPT | Performed by: NURSE PRACTITIONER

## 2020-01-14 RX ORDER — BUPROPION HYDROCHLORIDE 150 MG/1
150 TABLET ORAL DAILY
Qty: 30 TABLET | Refills: 6 | Status: SHIPPED | OUTPATIENT
Start: 2020-01-14 | End: 2020-05-18

## 2020-01-14 RX ORDER — ISOSORBIDE MONONITRATE 60 MG/1
TABLET, EXTENDED RELEASE ORAL
Qty: 90 TABLET | Refills: 3 | Status: SHIPPED | OUTPATIENT
Start: 2020-01-14 | End: 2020-11-19

## 2020-01-14 RX ORDER — ATORVASTATIN CALCIUM 20 MG/1
20 TABLET, FILM COATED ORAL NIGHTLY
Qty: 90 TABLET | Refills: 3 | Status: SHIPPED | OUTPATIENT
Start: 2020-01-14 | End: 2020-12-14

## 2020-01-14 RX ORDER — LORATADINE 10 MG/1
10 TABLET ORAL DAILY
Qty: 90 TABLET | Refills: 2 | Status: SHIPPED | OUTPATIENT
Start: 2020-01-14 | End: 2020-12-14

## 2020-01-14 NOTE — PATIENT INSTRUCTIONS
"DASH Eating Plan  DASH stands for \"Dietary Approaches to Stop Hypertension.\" The DASH eating plan is a healthy eating plan that has been shown to reduce high blood pressure (hypertension). It may also reduce your risk for type 2 diabetes, heart disease, and stroke. The DASH eating plan may also help with weight loss.  What are tips for following this plan?    General guidelines  · Avoid eating more than 2,300 mg (milligrams) of salt (sodium) a day. If you have hypertension, you may need to reduce your sodium intake to 1,500 mg a day.  · Limit alcohol intake to no more than 1 drink a day for nonpregnant women and 2 drinks a day for men. One drink equals 12 oz of beer, 5 oz of wine, or 1½ oz of hard liquor.  · Work with your health care provider to maintain a healthy body weight or to lose weight. Ask what an ideal weight is for you.  · Get at least 30 minutes of exercise that causes your heart to beat faster (aerobic exercise) most days of the week. Activities may include walking, swimming, or biking.  · Work with your health care provider or diet and nutrition specialist (dietitian) to adjust your eating plan to your individual calorie needs.  Reading food labels    · Check food labels for the amount of sodium per serving. Choose foods with less than 5 percent of the Daily Value of sodium. Generally, foods with less than 300 mg of sodium per serving fit into this eating plan.  · To find whole grains, look for the word \"whole\" as the first word in the ingredient list.  Shopping  · Buy products labeled as \"low-sodium\" or \"no salt added.\"  · Buy fresh foods. Avoid canned foods and premade or frozen meals.  Cooking  · Avoid adding salt when cooking. Use salt-free seasonings or herbs instead of table salt or sea salt. Check with your health care provider or pharmacist before using salt substitutes.  · Do not velasquez foods. Cook foods using healthy methods such as baking, boiling, grilling, and broiling instead.  · Cook with " heart-healthy oils, such as olive, canola, soybean, or sunflower oil.  Meal planning  · Eat a balanced diet that includes:  ? 5 or more servings of fruits and vegetables each day. At each meal, try to fill half of your plate with fruits and vegetables.  ? Up to 6-8 servings of whole grains each day.  ? Less than 6 oz of lean meat, poultry, or fish each day. A 3-oz serving of meat is about the same size as a deck of cards. One egg equals 1 oz.  ? 2 servings of low-fat dairy each day.  ? A serving of nuts, seeds, or beans 5 times each week.  ? Heart-healthy fats. Healthy fats called Omega-3 fatty acids are found in foods such as flaxseeds and coldwater fish, like sardines, salmon, and mackerel.  · Limit how much you eat of the following:  ? Canned or prepackaged foods.  ? Food that is high in trans fat, such as fried foods.  ? Food that is high in saturated fat, such as fatty meat.  ? Sweets, desserts, sugary drinks, and other foods with added sugar.  ? Full-fat dairy products.  · Do not salt foods before eating.  · Try to eat at least 2 vegetarian meals each week.  · Eat more home-cooked food and less restaurant, buffet, and fast food.  · When eating at a restaurant, ask that your food be prepared with less salt or no salt, if possible.  What foods are recommended?  The items listed may not be a complete list. Talk with your dietitian about what dietary choices are best for you.  Grains  Whole-grain or whole-wheat bread. Whole-grain or whole-wheat pasta. Brown rice. Oatmeal. Quinoa. Bulgur. Whole-grain and low-sodium cereals. Edwige bread. Low-fat, low-sodium crackers. Whole-wheat flour tortillas.  Vegetables  Fresh or frozen vegetables (raw, steamed, roasted, or grilled). Low-sodium or reduced-sodium tomato and vegetable juice. Low-sodium or reduced-sodium tomato sauce and tomato paste. Low-sodium or reduced-sodium canned vegetables.  Fruits  All fresh, dried, or frozen fruit. Canned fruit in natural juice (without  added sugar).  Meat and other protein foods  Skinless chicken or turkey. Ground chicken or turkey. Pork with fat trimmed off. Fish and seafood. Egg whites. Dried beans, peas, or lentils. Unsalted nuts, nut butters, and seeds. Unsalted canned beans. Lean cuts of beef with fat trimmed off. Low-sodium, lean deli meat.  Dairy  Low-fat (1%) or fat-free (skim) milk. Fat-free, low-fat, or reduced-fat cheeses. Nonfat, low-sodium ricotta or cottage cheese. Low-fat or nonfat yogurt. Low-fat, low-sodium cheese.  Fats and oils  Soft margarine without trans fats. Vegetable oil. Low-fat, reduced-fat, or light mayonnaise and salad dressings (reduced-sodium). Canola, safflower, olive, soybean, and sunflower oils. Avocado.  Seasoning and other foods  Herbs. Spices. Seasoning mixes without salt. Unsalted popcorn and pretzels. Fat-free sweets.  What foods are not recommended?  The items listed may not be a complete list. Talk with your dietitian about what dietary choices are best for you.  Grains  Baked goods made with fat, such as croissants, muffins, or some breads. Dry pasta or rice meal packs.  Vegetables  Creamed or fried vegetables. Vegetables in a cheese sauce. Regular canned vegetables (not low-sodium or reduced-sodium). Regular canned tomato sauce and paste (not low-sodium or reduced-sodium). Regular tomato and vegetable juice (not low-sodium or reduced-sodium). Pickles. Olives.  Fruits  Canned fruit in a light or heavy syrup. Fried fruit. Fruit in cream or butter sauce.  Meat and other protein foods  Fatty cuts of meat. Ribs. Fried meat. Cochran. Sausage. Bologna and other processed lunch meats. Salami. Fatback. Hotdogs. Bratwurst. Salted nuts and seeds. Canned beans with added salt. Canned or smoked fish. Whole eggs or egg yolks. Chicken or turkey with skin.  Dairy  Whole or 2% milk, cream, and half-and-half. Whole or full-fat cream cheese. Whole-fat or sweetened yogurt. Full-fat cheese. Nondairy creamers. Whipped toppings.  Processed cheese and cheese spreads.  Fats and oils  Butter. Stick margarine. Lard. Shortening. Ghee. Cochran fat. Tropical oils, such as coconut, palm kernel, or palm oil.  Seasoning and other foods  Salted popcorn and pretzels. Onion salt, garlic salt, seasoned salt, table salt, and sea salt. Worcestershire sauce. Tartar sauce. Barbecue sauce. Teriyaki sauce. Soy sauce, including reduced-sodium. Steak sauce. Canned and packaged gravies. Fish sauce. Oyster sauce. Cocktail sauce. Horseradish that you find on the shelf. Ketchup. Mustard. Meat flavorings and tenderizers. Bouillon cubes. Hot sauce and Tabasco sauce. Premade or packaged marinades. Premade or packaged taco seasonings. Relishes. Regular salad dressings.  Where to find more information:  · National Heart, Lung, and [employer] [place]: www.nhlbi.nih.gov  · American Heart Association: www.heart.org  Summary  · The DASH eating plan is a healthy eating plan that has been shown to reduce high blood pressure (hypertension). It may also reduce your risk for type 2 diabetes, heart disease, and stroke.  · With the DASH eating plan, you should limit salt (sodium) intake to 2,300 mg a day. If you have hypertension, you may need to reduce your sodium intake to 1,500 mg a day.  · When on the DASH eating plan, aim to eat more fresh fruits and vegetables, whole grains, lean proteins, low-fat dairy, and heart-healthy fats.  · Work with your health care provider or diet and nutrition specialist (dietitian) to adjust your eating plan to your individual calorie needs.  This information is not intended to replace advice given to you by your health care provider. Make sure you discuss any questions you have with your health care provider.  Document Released: 12/06/2012 Document Revised: 12/11/2017 Document Reviewed: 12/11/2017  [employer] Interactive Patient Education © 2019 [employer]    For more information:    Quit Now  [place]  1-800-QUIT-NOW  https://[place].quitlogix.org/en-[place]/  Steps to Quit Smoking  Smoking tobacco can be harmful to your health and can affect almost every organ in your body. Smoking puts you, and those around you, at risk for developing many serious chronic diseases. Quitting smoking is difficult, but it is one of the best things that you can do for your health. It is never too late to quit.  What are the benefits of quitting smoking?  When you quit smoking, you lower your risk of developing serious diseases and conditions, such as:  · Lung cancer or lung disease, such as COPD.  · Heart disease.  · Stroke.  · Heart attack.  · Infertility.  · Osteoporosis and bone fractures.  Additionally, symptoms such as coughing, wheezing, and shortness of breath may get better when you quit. You may also find that you get sick less often because your body is stronger at fighting off colds and infections. If you are pregnant, quitting smoking can help to reduce your chances of having a baby of low birth weight.  How do I get ready to quit?  When you decide to quit smoking, create a plan to make sure that you are successful. Before you quit:  · Pick a date to quit. Set a date within the next two weeks to give you time to prepare.  · Write down the reasons why you are quitting. Keep this list in places where you will see it often, such as on your bathroom mirror or in your car or wallet.  · Identify the people, places, things, and activities that make you want to smoke (triggers) and avoid them. Make sure to take these actions:  ¨ Throw away all cigarettes at home, at work, and in your car.  ¨ Throw away smoking accessories, such as ashtrays and lighters.  ¨ Clean your car and make sure to empty the ashtray.  ¨ Clean your home, including curtains and carpets.  · Tell your family, friends, and coworkers that you are quitting. Support from your loved ones can make quitting easier.  · Talk with your health care provider about  your options for quitting smoking.  · Find out what treatment options are covered by your health insurance.  What strategies can I use to quit smoking?  Talk with your healthcare provider about different strategies to quit smoking. Some strategies include:  · Quitting smoking altogether instead of gradually lessening how much you smoke over a period of time. Research shows that quitting “cold turkey” is more successful than gradually quitting.  · Attending in-person counseling to help you build problem-solving skills. You are more likely to have success in quitting if you attend several counseling sessions. Even short sessions of 10 minutes can be effective.  · Finding resources and support systems that can help you to quit smoking and remain smoke-free after you quit. These resources are most helpful when you use them often. They can include:  ¨ Online chats with a counselor.  ¨ Telephone quitlines.  ¨ Printed self-help materials.  ¨ Support groups or group counseling.  ¨ Text messaging programs.  ¨ Mobile phone applications.  · Taking medicines to help you quit smoking. (If you are pregnant or breastfeeding, talk with your health care provider first.) Some medicines contain nicotine and some do not. Both types of medicines help with cravings, but the medicines that include nicotine help to relieve withdrawal symptoms. Your health care provider may recommend:  ¨ Nicotine patches, gum, or lozenges.  ¨ Nicotine inhalers or sprays.  ¨ Non-nicotine medicine that is taken by mouth.  Talk with your health care provider about combining strategies, such as taking medicines while you are also receiving in-person counseling. Using these two strategies together makes you more likely to succeed in quitting than if you used either strategy on its own.  If you are pregnant or breastfeeding, talk with your health care provider about finding counseling or other support strategies to quit smoking. Do not take medicine to help you  quit smoking unless told to do so by your health care provider.  What things can I do to make it easier to quit?  Quitting smoking might feel overwhelming at first, but there is a lot that you can do to make it easier. Take these important actions:  · Reach out to your family and friends and ask that they support and encourage you during this time. Call telephone quitlines, reach out to support groups, or work with a counselor for support.  · Ask people who smoke to avoid smoking around you.  · Avoid places that trigger you to smoke, such as bars, parties, or smoke-break areas at work.  · Spend time around people who do not smoke.  · Lessen stress in your life, because stress can be a smoking trigger for some people. To lessen stress, try:  ¨ Exercising regularly.  ¨ Deep-breathing exercises.  ¨ Yoga.  ¨ Meditating.  ¨ Performing a body scan. This involves closing your eyes, scanning your body from head to toe, and noticing which parts of your body are particularly tense. Purposefully relax the muscles in those areas.  · Download or purchase mobile phone or tablet apps (applications) that can help you stick to your quit plan by providing reminders, tips, and encouragement. There are many free apps, such as QuitGuide from the CDC (Centers for Disease Control and Prevention). You can find other support for quitting smoking (smoking cessation) through smokefree.gov and other websites.  How will I feel when I quit smoking?  Within the first 24 hours of quitting smoking, you may start to feel some withdrawal symptoms. These symptoms are usually most noticeable 2-3 days after quitting, but they usually do not last beyond 2-3 weeks. Changes or symptoms that you might experience include:  · Mood swings.  · Restlessness, anxiety, or irritation.  · Difficulty concentrating.  · Dizziness.  · Strong cravings for sugary foods in addition to nicotine.  · Mild weight gain.  · Constipation.  · Nausea.  · Coughing or a sore  throat.  · Changes in how your medicines work in your body.  · A depressed mood.  · Difficulty sleeping (insomnia).  After the first 2-3 weeks of quitting, you may start to notice more positive results, such as:  · Improved sense of smell and taste.  · Decreased coughing and sore throat.  · Slower heart rate.  · Lower blood pressure.  · Clearer skin.  · The ability to breathe more easily.  · Fewer sick days.  Quitting smoking is very challenging for most people. Do not get discouraged if you are not successful the first time. Some people need to make many attempts to quit before they achieve long-term success. Do your best to stick to your quit plan, and talk with your health care provider if you have any questions or concerns.  This information is not intended to replace advice given to you by your health care provider. Make sure you discuss any questions you have with your health care provider.  Document Released: 12/12/2002 Document Revised: 08/15/2017 Document Reviewed: 05/03/2016  [employer] Interactive Patient Education © 2017 [employer]  Bupropion tablets (Depression/Mood Disorders)  What is this medicine?  BUPROPION (byoo PROE pee on) is used to treat depression.  This medicine may be used for other purposes; ask your health care provider or pharmacist if you have questions.  COMMON BRAND NAME(S): Wellbutrin  What should I tell my health care provider before I take this medicine?  They need to know if you have any of these conditions:  -an eating disorder, such as anorexia or bulimia  -bipolar disorder or psychosis  -diabetes or high blood sugar, treated with medication  -glaucoma  -heart disease, previous heart attack, or irregular heart beat  -head injury or brain tumor  -high blood pressure  -kidney or liver disease  -seizures  -suicidal thoughts or a previous suicide attempt  -Tourette's syndrome  -weight loss  -an unusual or allergic reaction to bupropion, other medicines, foods, dyes, or  preservatives  -breast-feeding  -pregnant or trying to become pregnant  How should I use this medicine?  Take this medicine by mouth with a glass of water. Follow the directions on the prescription label. You can take it with or without food. If it upsets your stomach, take it with food. Take your medicine at regular intervals. Do not take your medicine more often than directed. Do not stop taking this medicine suddenly except upon the advice of your doctor. Stopping this medicine too quickly may cause serious side effects or your condition may worsen.  A special MedGuide will be given to you by the pharmacist with each prescription and refill. Be sure to read this information carefully each time.  Talk to your pediatrician regarding the use of this medicine in children. Special care may be needed.  Overdosage: If you think you have taken too much of this medicine contact a poison control center or emergency room at once.  NOTE: This medicine is only for you. Do not share this medicine with others.  What if I miss a dose?  If you miss a dose, take it as soon as you can. If it is less than four hours to your next dose, take only that dose and skip the missed dose. Do not take double or extra doses.  What may interact with this medicine?  Do not take this medicine with any of the following medications:  -linezolid  -MAOIs like Azilect, Carbex, Eldepryl, Marplan, Nardil, and Parnate  -methylene blue (injected into a vein)  -other medicines that contain bupropion like Zyban  This medicine may also interact with the following medications:  -alcohol  -certain medicines for anxiety or sleep  -certain medicines for blood pressure like metoprolol, propranolol  -certain medicines for depression or psychotic disturbances  -certain medicines for HIV or AIDS like efavirenz, lopinavir, nelfinavir, ritonavir  -certain medicines for irregular heart beat like propafenone, flecainide  -certain medicines for Parkinson's disease like  amantadine, levodopa  -certain medicines for seizures like carbamazepine, phenytoin, phenobarbital  -cimetidine  -clopidogrel  -cyclophosphamide  -digoxin  -furazolidone  -isoniazid  -nicotine  -orphenadrine  -procarbazine  -steroid medicines like prednisone or cortisone  -stimulant medicines for attention disorders, weight loss, or to stay awake  -tamoxifen  -theophylline  -thiotepa  -ticlopidine  -tramadol  -warfarin  This list may not describe all possible interactions. Give your health care provider a list of all the medicines, herbs, non-prescription drugs, or dietary supplements you use. Also tell them if you smoke, drink alcohol, or use illegal drugs. Some items may interact with your medicine.  What should I watch for while using this medicine?  Tell your doctor if your symptoms do not get better or if they get worse. Visit your doctor or health care professional for regular checks on your progress. Because it may take several weeks to see the full effects of this medicine, it is important to continue your treatment as prescribed by your doctor.  Patients and their families should watch out for new or worsening thoughts of suicide or depression. Also watch out for sudden changes in feelings such as feeling anxious, agitated, panicky, irritable, hostile, aggressive, impulsive, severely restless, overly excited and hyperactive, or not being able to sleep. If this happens, especially at the beginning of treatment or after a change in dose, call your health care professional.  Avoid alcoholic drinks while taking this medicine. Drinking excessive alcoholic beverages, using sleeping or anxiety medicines, or quickly stopping the use of these agents while taking this medicine may increase your risk for a seizure.  Do not drive or use heavy machinery until you know how this medicine affects you. This medicine can impair your ability to perform these tasks.  Do not take this medicine close to bedtime. It may prevent  you from sleeping.  Your mouth may get dry. Chewing sugarless gum or sucking hard candy, and drinking plenty of water may help. Contact your doctor if the problem does not go away or is severe.  What side effects may I notice from receiving this medicine?  Side effects that you should report to your doctor or health care professional as soon as possible:  -allergic reactions like skin rash, itching or hives, swelling of the face, lips, or tongue  -breathing problems  -changes in vision  -confusion  -elevated mood, decreased need for sleep, racing thoughts, impulsive behavior  -fast or irregular heartbeat  -hallucinations, loss of contact with reality  -increased blood pressure  -redness, blistering, peeling or loosening of the skin, including inside the mouth  -seizures  -suicidal thoughts or other mood changes  -unusually weak or tired  -vomiting  Side effects that usually do not require medical attention (report to your doctor or health care professional if they continue or are bothersome):  -constipation  -headache  -loss of appetite  -nausea  -tremors  -weight loss  This list may not describe all possible side effects. Call your doctor for medical advice about side effects. You may report side effects to FDA at 6-285-FDA-8973.  Where should I keep my medicine?  Keep out of the reach of children.  Store at room temperature between 20 and 25 degrees C (68 and 77 degrees F), away from direct sunlight and moisture. Keep tightly closed. Throw away any unused medicine after the expiration date.  NOTE: This sheet is a summary. It may not cover all possible information. If you have questions about this medicine, talk to your doctor, pharmacist, or health care provider.  © 2019 [employer] (2017-06-09 13:44:21)

## 2020-01-14 NOTE — PROGRESS NOTES
Chief Complaint   Patient presents with   • Follow-up     Cardiac management. No cardiac complaints today    • Nicotine Dependence     Has taken Chantix, has quit smoking for past 2 months.  Is going to  use  Nicotine patches to reinforce cravings.   • Med Refill     Needs refills on Atorvastatin and  Isosorbide 60 mg-  90 day supply to [facility]. Had medication list today.       Subjective       Sawyer Tilley is a 59 y.o. male with a history of HTN, hyperlipidemia, and ischemic heart disease for which he underwent bypass surgery in 2011. Cardiac workup in 2014 showed only a small inferior infarct. He has been unsuccessful with smoking cessation attempts. CXR on 7/13/18 reported as negative for pulmonary disease. July 2018 labs: normal TSH and testosterone. BUN 25, creatinine 1.35, GFR 57. Glucose 118. For increased triglycerides, fenofibrate was added. He had declined repeat testing due to cost of stress test.     At his last office visit he agreed to proceed with cardiac work-up as it had been over 5 years.  On 7/22/2019 Lexiscan stress test showed inferior lateral wall infarct with jhoana-infarct ischemia.  The dose of Imdur was increased to 60 mg daily with recommendation for elective cath if symptoms persisted.Echocardiogram showed left atrial cavity was dilated at 4.5 cm.  There was mild LVH.  LVEF was 56-60%.  Mild to moderate MR noted. Labs showed LDL not at goal on Simvastatin and changed to higher intensity statin in form of Lipitor.     Today comes the office for a follow-up visit.  He maintains increased dose of Imdur and denies chest pain, dizziness, palpitations, or increased shortness of breath.  Two months ago he stopped smoking using Chantix.  He does admit to anxiety and has difficulty maintaining smoking cessation.  His weight has gone up about 10 pounds which he attributes to eating more since he has stop smoking.    HPI     Cardiac History:    Past Surgical History:   Procedure Laterality  Date   • CARDIAC CATHETERIZATION  01/08/2011    Cath-60%LAD, 90%D1, 75%LCX, 80%OM. 100%RCA.   • CARDIOVASCULAR STRESS TEST  11/03/2011    Stress-4min, 41sec, 84%THR, 160/86. Inferior Ischemia   • CARDIOVASCULAR STRESS TEST  09/30/2014    L.Myview-no ischemia, small fixed inferior infarct   • CARDIOVASCULAR STRESS TEST  07/22/2019    L.Cardiolite- EF 57%. Inferolateral Infarct with periinfarct Ischemia.   • CONVERTED (HISTORICAL) CARDIOVASCULAR STUDIES  01/10/2011    LIMA-LAD, SVG-OM, SVG-PDA, & PHYLLIS to Dr. Valdez   • CORONARY ARTERY BYPASS GRAFT  01/10/2011    LIMA-LAD, SVG-OM, SVG-PDA. PHYLLIS- D1   • ECHO - CONVERTED  11/03/2011    Echo-EF 65/70%   • ECHO - CONVERTED  09/30/2004    Echo-EF 65%, mild MR, borderline pulm HTN   • ECHO - CONVERTED  07/22/2019    EF 60%. Mild- Mod MR. LA- 4.5 Cm   • OTHER SURGICAL HISTORY  01/09/2011     Carotid US- No sig. stenosis.        Current Outpatient Medications   Medication Sig Dispense Refill   • acetaminophen (TYLENOL) 500 MG tablet Take 500 mg by mouth every 6 (six) hours as needed for mild pain (1-3).     • aspirin 325 MG tablet Take 325 mg by mouth daily.     • atorvastatin (LIPITOR) 20 MG tablet Take 1 tablet by mouth Every Night. 90 tablet 3   • carvedilol (COREG) 6.25 MG tablet Take 1 tablet by mouth 2 (Two) Times a Day With Meals. 180 tablet 3   • fenofibrate (TRICOR) 145 MG tablet Take 1 tablet by mouth Daily. 90 tablet 3   • fluticasone (FLONASE) 50 MCG/ACT nasal spray 2 sprays into the nostril(s) as directed by provider As Needed.     • isosorbide mononitrate (IMDUR) 60 MG 24 hr tablet Take 1 tablet every evening 90 tablet 3   • loratadine (CLARITIN) 10 MG tablet Take 1 tablet by mouth Daily. 90 tablet 2   • methocarbamol (ROBAXIN) 750 MG tablet Take 750 mg by mouth 3 (Three) Times a Day As Needed for Muscle Spasms.     • omeprazole (priLOSEC) 20 MG capsule Take 20 mg by mouth Daily.     • buPROPion XL (WELLBUTRIN XL) 150 MG 24 hr tablet Take 1 tablet by mouth Daily.  30 tablet 6     No current facility-administered medications for this visit.        Keflex [cephalexin]    Past Medical History:   Diagnosis Date   • Anemia     Acute blood loss Anemia   • Back pain     Chronic   • Dyslipidemia    • GERD (gastroesophageal reflux disease)    • H pylori ulcer     Positive   • Hypertension    • IHD (ischemic heart disease)     s/p CABG X 4-V   • Sleep apnea     wears CPAP   • Thrombocytopenia (CMS/HCC)    • Vitamin D deficiency        Social History     Socioeconomic History   • Marital status:      Spouse name: Not on file   • Number of children: Not on file   • Years of education: Not on file   • Highest education level: Not on file   Tobacco Use   • Smoking status: Current Every Day Smoker     Packs/day: 1.00     Types: Cigarettes   • Smokeless tobacco: Never Used   • Tobacco comment: patient reports has cut back on cigarette smoking, not ready to quit yet, counseled patient on effect's of smoking on health.   Substance and Sexual Activity   • Alcohol use: No   • Drug use: No       Family History   Problem Relation Age of Onset   • Hypertension Mother    • Alzheimer's disease Mother    • Hypertension Father    • Alzheimer's disease Father    • Hypertension Other        Review of Systems   Constitution: Positive for weight gain. Negative for decreased appetite, diaphoresis and malaise/fatigue.   HENT: Positive for congestion (Mild sinus and nasal, relates to allergies). Negative for ear pain, hoarse voice, nosebleeds and sore throat.    Eyes: Negative.    Cardiovascular: Negative for chest pain, leg swelling, near-syncope and palpitations.   Respiratory: Positive for cough and shortness of breath (Improved after stop smoking). Negative for sleep disturbances due to breathing.    Endocrine: Negative for polydipsia, polyphagia and polyuria.   Hematologic/Lymphatic: Negative for bleeding problem. Does not bruise/bleed easily.   Skin: Positive for flushing (Not a new problem).  "Negative for dry skin and itching.   Musculoskeletal: Positive for arthritis. Negative for muscle cramps.   Gastrointestinal: Negative.    Genitourinary: Negative.    Neurological: Negative for dizziness, headaches, numbness and paresthesias.   Psychiatric/Behavioral: The patient is nervous/anxious (Worse since he has stopped smoking).    Allergic/Immunologic: Positive for environmental allergies. Negative for hives and persistent infections.        Objective     /78 (BP Location: Right arm)   Pulse 76   Ht 167.6 cm (65.98\")   Wt 95.7 kg (211 lb)   BMI 34.08 kg/m²     Physical Exam   Constitutional: He is oriented to person, place, and time. He appears well-nourished.   HENT:   Head: Normocephalic.   Eyes: Pupils are equal, round, and reactive to light. Conjunctivae are normal.   Neck: Normal range of motion. Neck supple.   Cardiovascular: Normal rate, regular rhythm, S1 normal and S2 normal.   Murmur heard.   Systolic murmur is present with a grade of 2/6.  Pulses:       Radial pulses are 2+ on the right side, and 2+ on the left side.   Pulmonary/Chest: Effort normal and breath sounds normal. He has no wheezes. He has no rales.   Abdominal: Soft. Bowel sounds are normal. He exhibits no distension. There is no tenderness.   Musculoskeletal: Normal range of motion. He exhibits no edema.   Neurological: He is alert and oriented to person, place, and time.   Skin: Skin is warm and dry.   Psychiatric: His speech is normal and behavior is normal. Thought content normal. His mood appears anxious. Cognition and memory are normal.        Procedures: none today         Assessment/Plan      Sawyer was seen today for follow-up, nicotine dependence and med refill.    Diagnoses and all orders for this visit:    Coronary artery disease involving native coronary artery of native heart without angina pectoris  -     isosorbide mononitrate (IMDUR) 60 MG 24 hr tablet; Take 1 tablet every evening    Essential hypertension  -  "    Comprehensive Metabolic Panel; Future    Hypercholesteremia  -     atorvastatin (LIPITOR) 20 MG tablet; Take 1 tablet by mouth Every Night.  -     Comprehensive Metabolic Panel; Future  -     Lipid Panel; Future    Murmur, cardiac    Anxiety  -     buPROPion XL (WELLBUTRIN XL) 150 MG 24 hr tablet; Take 1 tablet by mouth Daily.    Former smoker    Environmental allergies  -     loratadine (CLARITIN) 10 MG tablet; Take 1 tablet by mouth Daily.    Medication management  -     Comprehensive Metabolic Panel; Future  -     Lipid Panel; Future      CAD-  the reports of his most recent stress test and echocardiogram were reviewed.  He remains asymptomatic on current medications.  He is taking daily aspirin 325 mg.  At this time we will continue medication management.  If symptoms develop then elective cardiac catheterization will be considered.    Hypertension/anxiety- blood pressure increased today.  After sitting for several minutes his blood pressure was rechecked being 140/72.  He does admit to feeling anxious as he did have to wait on appointment.  He has more anxiety since smoking cessation.  He agrees to try Wellbutrin which may also help him avoid returning to smoking.  I advised him to monitor his blood pressure and if systolic is consistently greater than 150 to call the office and change in antihypertensive medication would be advised.    Patient's Body mass index is 34.08 kg/m². BMI is above normal parameters. Recommendations include: nutrition counseling.  Weight is up about 10 pounds since last office visit.  I encouraged him on diet for weight loss and DASH diet.     For lipid management he is currently on TriCor and Lipitor. A lab order given to reassess lipid panel and LFT since change in statin therapy.      He admits to mild allergy symptoms, nasal congestion. Prescription for Claritin sent to his pharmacy which he has taken in the past with benefit.      A 6 month follow up visit scheduled. Please  call sooner for any cardiac concerns.            Electronically signed by KATHLEEN Mustafa,  January 16, 2020 11:29 AM

## 2020-01-16 ENCOUNTER — TELEPHONE (OUTPATIENT)
Dept: CARDIOLOGY | Facility: CLINIC | Age: 60
End: 2020-01-16

## 2020-01-16 RX ORDER — ATORVASTATIN CALCIUM 20 MG/1
20 TABLET, FILM COATED ORAL NIGHTLY
Qty: 90 TABLET | Refills: 2 | OUTPATIENT
Start: 2020-01-16

## 2020-01-16 NOTE — TELEPHONE ENCOUNTER
I placed order for external lab CMP and lipid panel. He is taking different statin since last visit and needed to recheck his labs. Please inform patient. Thank you.

## 2020-01-17 NOTE — TELEPHONE ENCOUNTER
I spoke with patient, made him aware of lab order and order faxed to [facility] at [phone]

## 2020-02-06 DIAGNOSIS — Z00.6 EXAMINATION FOR NORMAL COMPARISON FOR CLINICAL RESEARCH: Primary | ICD-10-CM

## 2020-02-13 ENCOUNTER — TELEPHONE (OUTPATIENT)
Dept: CARDIOLOGY | Facility: CLINIC | Age: 60
End: 2020-02-13

## 2020-02-13 ENCOUNTER — OFFICE VISIT (OUTPATIENT)
Dept: CARDIAC SURGERY | Facility: CLINIC | Age: 60
End: 2020-02-13

## 2020-02-13 VITALS
HEART RATE: 67 BPM | TEMPERATURE: 97.8 F | WEIGHT: 208.6 LBS | OXYGEN SATURATION: 98 % | DIASTOLIC BLOOD PRESSURE: 73 MMHG | HEIGHT: 66 IN | BODY MASS INDEX: 33.52 KG/M2 | SYSTOLIC BLOOD PRESSURE: 127 MMHG

## 2020-02-13 DIAGNOSIS — R91.1 LUNG NODULE: Primary | ICD-10-CM

## 2020-02-13 DIAGNOSIS — I10 ESSENTIAL HYPERTENSION: ICD-10-CM

## 2020-02-13 DIAGNOSIS — I25.10 CORONARY ARTERY DISEASE INVOLVING NATIVE CORONARY ARTERY OF NATIVE HEART WITHOUT ANGINA PECTORIS: Primary | ICD-10-CM

## 2020-02-13 DIAGNOSIS — E78.00 HYPERCHOLESTEREMIA: ICD-10-CM

## 2020-02-13 DIAGNOSIS — R91.1 SOLITARY PULMONARY NODULE: ICD-10-CM

## 2020-02-13 PROCEDURE — 99204 OFFICE O/P NEW MOD 45 MIN: CPT | Performed by: THORACIC SURGERY (CARDIOTHORACIC VASCULAR SURGERY)

## 2020-02-13 NOTE — TELEPHONE ENCOUNTER
Lora from [facility] CT surgery called reporting patient had been in their  Office today and he had told them he had chest pain.

## 2020-02-13 NOTE — PROGRESS NOTES
02/13/2020  Patient Information  Sawyer Tilley                                                                                          [address] [place] [address]   1960  'PCP/Referring Physician'  Germán Casey MD  [phone]  Germán Casey MD  [phone]  Chief Complaint   Patient presents with   • Lung Nodule     Referred by Dr. Germán Casey for left upper lobe lung nodule found on screening for lung cancer due to smoking history     CC: I have a spot on my lung    History of Present Illness: 59-year-old  male being seen at the request of Dr. Casey for evaluation of a left upper lobe nodule.  This gentleman has a significant past smoking history of approximately 60 pack years.  Tobacco usage is continuing.  The patient has a dry nonproductive cough.  He has not had weight loss or hemoptysis.  He has not had acute or sharp chest pain.  In this setting he recently was evaluated by Dr. Casey and because of his smoking history underwent a screening low-dose spiral CT scan of the chest.  I have reviewed this CT scan in detail and have noted the radiologist report as well.  There is a nodule in the periphery of the left upper lobe that measures approximately 1 x 2.4 cm.  This nodule has a spiculated appearance and is certainly consistent with a malignant nodule.  There is a second small round nodule at the apex that measures less than 5 mm in diameter and is likely a scar.  Mediastinal lymphadenopathy is absent.  There is is significant calcification of the LAD and some calcification in the descending aorta.  The patient is status post CABG.  Historically the patient states that he underwent CABG in 2011.  He had 4 bypass grafts but is unaware of exactly where the grafts were placed.  He states that he had a stress test 9 months ago that was negative.  Within the last 2 weeks however he has had an episode of diaphoresis and anterior dull aching chest pain that lasted  approximately 3 hours.  He did not have nausea he did not have syncope.  He had a second similar episode about 2 weeks ago that resolved in 15 to 20 minutes.      Patient Active Problem List   Diagnosis   • Coronary artery disease involving native coronary artery of native heart without angina pectoris   • Hypercholesteremia   • Essential hypertension   • Murmur, cardiac     Past Medical History:   Diagnosis Date   • Anemia     Acute blood loss Anemia   • Arthritis    • Back pain     Chronic   • Mireles's esophagus    • Dyslipidemia    • GERD (gastroesophageal reflux disease)    • H pylori ulcer     Positive   • Hyperlipidemia    • Hypertension    • IHD (ischemic heart disease)     s/p CABG X 4-V   • Kidney stone    • Myocardial infarction (CMS/[facility]) 2011   • Sleep apnea     wears CPAP   • Thrombocytopenia (CMS/[facility])    • Vitamin D deficiency      Past Surgical History:   Procedure Laterality Date   • BACK SURGERY  02/2010   • CARDIAC CATHETERIZATION  01/08/2011    Cath-60%LAD, 90%D1, 75%LCX, 80%OM. 100%RCA.   • CARDIOVASCULAR STRESS TEST  11/03/2011    Stress-4min, 41sec, 84%THR, 160/86. Inferior Ischemia   • CARDIOVASCULAR STRESS TEST  09/30/2014    L.Myview-no ischemia, small fixed inferior infarct   • CARDIOVASCULAR STRESS TEST  07/22/2019    L.Cardiolite- EF 57%. Inferolateral Infarct with periinfarct Ischemia.   • CATARACT EXTRACTION Bilateral    • CONVERTED (HISTORICAL) CARDIOVASCULAR STUDIES  01/10/2011    LIMA-LAD, SVG-OM, SVG-PDA, & PHYLLIS to Dr. Valdez   • CORONARY ARTERY BYPASS GRAFT  01/10/2011    LIMA-LAD, SVG-OM, SVG-PDA. PHYLLIS- D1   • ECHO - CONVERTED  11/03/2011    Echo-EF 65/70%   • ECHO - CONVERTED  09/30/2004    Echo-EF 65%, mild MR, borderline pulm HTN   • ECHO - CONVERTED  07/22/2019    EF 60%. Mild- Mod MR. LA- 4.5 Cm   • HYDROCELE EXCISION / REPAIR Left 2014   • OTHER SURGICAL HISTORY  01/09/2011     Carotid US- No sig. stenosis.        Current Outpatient Medications:   •  acetaminophen (TYLENOL)  500 MG tablet, Take 500 mg by mouth every 6 (six) hours as needed for mild pain (1-3)., Disp: , Rfl:   •  aspirin 325 MG tablet, Take 325 mg by mouth daily., Disp: , Rfl:   •  atorvastatin (LIPITOR) 20 MG tablet, Take 1 tablet by mouth Every Night., Disp: 90 tablet, Rfl: 3  •  carvedilol (COREG) 6.25 MG tablet, Take 1 tablet by mouth 2 (Two) Times a Day With Meals., Disp: 180 tablet, Rfl: 3  •  fenofibrate (TRICOR) 145 MG tablet, Take 1 tablet by mouth Daily., Disp: 90 tablet, Rfl: 3  •  fluticasone (FLONASE) 50 MCG/ACT nasal spray, 2 sprays into the nostril(s) as directed by provider As Needed., Disp: , Rfl:   •  isosorbide mononitrate (IMDUR) 60 MG 24 hr tablet, Take 1 tablet every evening, Disp: 90 tablet, Rfl: 3  •  methocarbamol (ROBAXIN) 750 MG tablet, Take 750 mg by mouth 3 (Three) Times a Day As Needed for Muscle Spasms., Disp: , Rfl:   •  omeprazole (priLOSEC) 20 MG capsule, Take 20 mg by mouth Daily., Disp: , Rfl:   •  buPROPion XL (WELLBUTRIN XL) 150 MG 24 hr tablet, Take 1 tablet by mouth Daily., Disp: 30 tablet, Rfl: 6  •  loratadine (CLARITIN) 10 MG tablet, Take 1 tablet by mouth Daily., Disp: 90 tablet, Rfl: 2  Allergies   Allergen Reactions   • Keflex [Cephalexin] Itching     Social History     Socioeconomic History   • Marital status:      Spouse name: Not on file   • Number of children: 2   • Years of education: Not on file   • Highest education level: Not on file   Occupational History     Employer: DISABLED   Tobacco Use   • Smoking status: Current Every Day Smoker     Packs/day: 1.00     Years: 46.00     Pack years: 46.00     Types: Cigarettes   • Smokeless tobacco: Never Used   • Tobacco comment: patient reports has cut back on cigarette smoking, not ready to quit yet, counseled patient on effect's of smoking on health.   Substance and Sexual Activity   • Alcohol use: No   • Drug use: No   Social History Narrative    Lives in [place], [place] with spouse     Family History   Problem  "Relation Age of Onset   • Hypertension Mother    • Alzheimer's disease Mother    • Hypertension Father    • Alzheimer's disease Father    • Hypertension Other      Review of Systems   Constitution: Positive for diaphoresis. Negative for chills, fever, malaise/fatigue, night sweats and weight loss.   HENT: Negative for hearing loss, odynophagia and sore throat.    Eyes: Positive for blurred vision and visual disturbance.   Cardiovascular: Positive for chest pain and dyspnea on exertion. Negative for leg swelling, orthopnea and palpitations.   Respiratory: Positive for cough, shortness of breath and sputum production. Negative for hemoptysis.    Endocrine: Negative for cold intolerance, heat intolerance, polydipsia, polyphagia and polyuria.   Hematologic/Lymphatic: Does not bruise/bleed easily.   Skin: Negative for itching and rash.   Musculoskeletal: Positive for arthritis, back pain, joint pain and muscle cramps. Negative for joint swelling and myalgias.   Gastrointestinal: Positive for flatus. Negative for abdominal pain, constipation, diarrhea, hematemesis, hematochezia, melena, nausea and vomiting.   Genitourinary: Positive for hesitancy. Negative for dysuria, frequency and hematuria.   Neurological: Negative for focal weakness, headaches, numbness and seizures.   Psychiatric/Behavioral: Negative for depression and suicidal ideas. The patient is not nervous/anxious.    All other systems reviewed and are negative.    Vitals:    02/13/20 1029   BP: 127/73   BP Location: Right arm   Patient Position: Sitting   Pulse: 67   Temp: 97.8 °F (36.6 °C)   TempSrc: Temporal   SpO2: 98%   Weight: 94.6 kg (208 lb 9.6 oz)   Height: 167.6 cm (66\")      Physical Exam   Constitutional: He is oriented to person, place, and time. He appears well-developed and well-nourished. No distress.   Moderately obese  male in good spirits.  The patient is an excellent historian.  Mild diaphoresis noted on this physical exam.   HENT: "   Head: Normocephalic.   Right Ear: External ear normal.   Left Ear: External ear normal.   Nose: Nose normal.   Eyes: Pupils are equal, round, and reactive to light.   Neck: Normal range of motion. Neck supple. No tracheal deviation present. No thyromegaly present.   No cervical adenopathy   Cardiovascular: Normal rate, regular rhythm and intact distal pulses.   Murmur heard.  1/6 systolic murmur left sternal border.  S1 and S2 are normal.   Pulmonary/Chest: Effort normal and breath sounds normal. No respiratory distress. He has no wheezes. He has no rales. He exhibits no tenderness.   Abdominal: Soft. Bowel sounds are normal. He exhibits no distension and no mass. There is no tenderness.   Musculoskeletal: Normal range of motion. He exhibits no edema.   Lymphadenopathy:     He has no cervical adenopathy.   Neurological: He is alert and oriented to person, place, and time. He has normal reflexes. No cranial nerve deficit.   Skin: Skin is warm and dry. No rash noted. No erythema.   Psychiatric: He has a normal mood and affect. His behavior is normal. Judgment and thought content normal.       Labs/Imaging: Screening CT scan of the chest reviewed as noted in the present illness    Assessment: Likely malignant pulmonary nodule in the left upper lobe in this gentleman who has an ongoing smoking habit.    Plan: I have discussed the situation at length with the patient.  Options in this individual include needle biopsy which would be technically difficult because of the location of the lesion under the patient's scapula.  Bronchoscopy plus minus navigational bronchoscopy which is likely to have a negative yield because of the location of the lesion.  EBUS which is likely to have a negative yield because there is no evidence of adenopathy on the CT scan.  Left VATS/possible thoracotomy with wedge biopsy is also an option.  The best option in my opinion in this individual would be to proceed with a PET scan.  If the lesion  is avid then open thoracotomy and biopsy with subsequent left upper lobectomy if the lesion proves on pathology to be malignant.  If the lesion is not avid then continued follow-up for growth would be indicated.  The patient understands these options and would like to proceed with PET scan.  In addition to that I have advised the patient that he needs to follow-up with Dr. Murillo in [place] for evaluation of his coronary artery disease status.    Patient Active Problem List   Diagnosis   • Coronary artery disease involving native coronary artery of native heart without angina pectoris   • Hypercholesteremia   • Essential hypertension   • Murmur, cardiac       Rudolph Cm MD  [facility]gery  02/13/20   1:54 PM

## 2020-02-14 DIAGNOSIS — Z95.1 HX OF CABG: ICD-10-CM

## 2020-02-14 DIAGNOSIS — I20.0 UNSTABLE ANGINA (HCC): ICD-10-CM

## 2020-02-14 DIAGNOSIS — I25.9 IHD (ISCHEMIC HEART DISEASE): Primary | ICD-10-CM

## 2020-02-14 NOTE — TELEPHONE ENCOUNTER
Please inform patient if he is having symptoms then elective cath advised. I will place order if patient agrees.

## 2020-03-03 ENCOUNTER — OUTSIDE FACILITY SERVICE (OUTPATIENT)
Dept: CARDIOLOGY | Facility: CLINIC | Age: 60
End: 2020-03-03

## 2020-03-03 PROCEDURE — 93459 L HRT ART/GRFT ANGIO: CPT | Performed by: INTERNAL MEDICINE

## 2020-03-04 RX ORDER — RANOLAZINE 500 MG/1
500 TABLET, EXTENDED RELEASE ORAL 2 TIMES DAILY
Qty: 180 TABLET | Refills: 3 | Status: SHIPPED | OUTPATIENT
Start: 2020-03-04 | End: 2020-12-08

## 2020-03-04 NOTE — TELEPHONE ENCOUNTER
Patient request Ranexa  Prescription be sent to [facility] Mail delivery. He is reminded of follow up appointment 4- at 12pm .

## 2020-03-18 DIAGNOSIS — Z00.6 EXAMINATION FOR NORMAL COMPARISON FOR CLINICAL RESEARCH: Primary | ICD-10-CM

## 2020-04-28 RX ORDER — FENOFIBRATE 145 MG/1
TABLET, COATED ORAL
Qty: 90 TABLET | Refills: 1 | Status: SHIPPED | OUTPATIENT
Start: 2020-04-28 | End: 2020-11-19

## 2020-04-28 RX ORDER — CARVEDILOL 6.25 MG/1
6.25 TABLET ORAL 2 TIMES DAILY WITH MEALS
Qty: 180 TABLET | Refills: 1 | Status: SHIPPED | OUTPATIENT
Start: 2020-04-28 | End: 2020-11-19

## 2020-05-18 ENCOUNTER — OFFICE VISIT (OUTPATIENT)
Dept: CARDIOLOGY | Facility: CLINIC | Age: 60
End: 2020-05-18

## 2020-05-18 VITALS
SYSTOLIC BLOOD PRESSURE: 140 MMHG | HEIGHT: 66 IN | WEIGHT: 210 LBS | BODY MASS INDEX: 33.75 KG/M2 | HEART RATE: 76 BPM | DIASTOLIC BLOOD PRESSURE: 78 MMHG

## 2020-05-18 DIAGNOSIS — I25.9 IHD (ISCHEMIC HEART DISEASE): ICD-10-CM

## 2020-05-18 DIAGNOSIS — I25.118 CORONARY ARTERY DISEASE OF NATIVE ARTERY OF NATIVE HEART WITH STABLE ANGINA PECTORIS (HCC): ICD-10-CM

## 2020-05-18 DIAGNOSIS — E78.00 HYPERCHOLESTEREMIA: ICD-10-CM

## 2020-05-18 DIAGNOSIS — G47.33 OBSTRUCTIVE SLEEP APNEA SYNDROME: ICD-10-CM

## 2020-05-18 DIAGNOSIS — F17.210 CIGARETTE SMOKER: ICD-10-CM

## 2020-05-18 DIAGNOSIS — E78.1 HYPERTRIGLYCERIDEMIA: ICD-10-CM

## 2020-05-18 DIAGNOSIS — I10 ESSENTIAL HYPERTENSION: ICD-10-CM

## 2020-05-18 PROCEDURE — 99214 OFFICE O/P EST MOD 30 MIN: CPT | Performed by: NURSE PRACTITIONER

## 2020-05-18 RX ORDER — NITROGLYCERIN 0.4 MG/1
TABLET SUBLINGUAL
Qty: 25 TABLET | Refills: 1 | Status: SHIPPED | OUTPATIENT
Start: 2020-05-18 | End: 2021-07-07

## 2020-05-18 NOTE — PROGRESS NOTES
Chief Complaint   Patient presents with   • Hospital Follow up     Post cardiac cath  is taking Ranexa . Is needing cardiac clearance for Colonoscopy  per Dr. Foster   • Shortness of Breath     Continues to have with exertion , is no longer taking Wellbutrin and has started smoking again   • Med Refill     No refills needed today , had medication list today.       Subjective       Sawyer Tilley is a 60 y.o. male with a history of HTN, hyperlipidemia, and ischemic heart disease for which he underwent bypass surgery in 2011. Cardiac workup in 2014 showed only a small inferior infarct.  On 7/22/2019 Lexiscan stress test showed inferior lateral wall infarct with jhoana-infarct ischemia.  The dose of Imdur was increased to 60 mg daily with recommendation for elective cath if symptoms persisted.Echocardiogram showed left atrial cavity was dilated at 4.5 cm.  There was mild LVH.  LVEF was 56-60%.  Mild to moderate MR noted. Labs showed LDL not at goal on Simvastatin and changed to higher intensity statin in form of Lipitor.     At his last office visit Wellbutrin was prescribed to help with smoking cessation. He denied cardiac symptoms and medication management advised. In February 2019 he admitted to more chest pain and cardiac cath was ordered. On 3/3/20 he underwent cardiac catheterization that showed patent LIMA and PHYLLIS but both of his vein grafts were occluded.     Today he comes to the office for a hospital follow up visit. He reports improvement of chest pain with addition of Ranexa. Only with exertion he admits to having some anginal symptoms that improve with rest. He reports shortness of breath had improved until he resumed smoking. Due to bad dreams he states he was not able to tolerate Chantix or Wellbutrin. He is using CPAP routinely without issues noted. No dizziness or palpitations noted.     HPI     Cardiac History:    Past Surgical History:   Procedure Laterality Date   • BACK SURGERY  02/2010   • CARDIAC  CATHETERIZATION  01/08/2011    Cath-60%LAD, 90%D1, 75%LCX, 80%OM. 100%RCA.   • CARDIAC CATHETERIZATION  03/03/2020    Patent LIMA -LAD & PHYLLIS - D1. 100% SVG -OM & SVG-PDA.   • CARDIOVASCULAR STRESS TEST  11/03/2011    Stress-4min, 41sec, 84%THR, 160/86. Inferior Ischemia   • CARDIOVASCULAR STRESS TEST  09/30/2014    L.Myview-no ischemia, small fixed inferior infarct   • CARDIOVASCULAR STRESS TEST  07/22/2019    L.Cardiolite- EF 57%. Inferolateral Infarct with periinfarct Ischemia.   • CATARACT EXTRACTION Bilateral    • CORONARY ARTERY BYPASS GRAFT  01/10/2011    LIMA-LAD, SVG-OM, SVG-PDA. PHYLLIS- D1   • ECHO - CONVERTED  11/03/2011    Echo-EF 65/70%   • ECHO - CONVERTED  09/30/2004    Echo-EF 65%, mild MR, borderline pulm HTN   • ECHO - CONVERTED  07/22/2019    EF 60%. Mild- Mod MR. LA- 4.5 Cm   • HYDROCELE EXCISION / REPAIR Left 2014   • OTHER SURGICAL HISTORY  01/09/2011     Carotid US- No sig. stenosis.        Current Outpatient Medications   Medication Sig Dispense Refill   • acetaminophen (TYLENOL) 500 MG tablet Take 500 mg by mouth every 6 (six) hours as needed for mild pain (1-3).     • aspirin 325 MG tablet Take 325 mg by mouth daily.     • atorvastatin (LIPITOR) 20 MG tablet Take 1 tablet by mouth Every Night. 90 tablet 3   • carvedilol (COREG) 6.25 MG tablet TAKE 1 TABLET BY MOUTH 2 (TWO) TIMES A DAY WITH MEALS. 180 tablet 1   • fenofibrate (TRICOR) 145 MG tablet TAKE 1 TABLET EVERY DAY 90 tablet 1   • fluticasone (FLONASE) 50 MCG/ACT nasal spray 2 sprays into the nostril(s) as directed by provider As Needed.     • isosorbide mononitrate (IMDUR) 60 MG 24 hr tablet Take 1 tablet every evening 90 tablet 3   • loratadine (CLARITIN) 10 MG tablet Take 1 tablet by mouth Daily. 90 tablet 2   • methocarbamol (ROBAXIN) 750 MG tablet Take 750 mg by mouth 3 (Three) Times a Day As Needed for Muscle Spasms.     • omeprazole (priLOSEC) 20 MG capsule Take 20 mg by mouth Daily.     • ranolazine (RANEXA) 500 MG 12 hr tablet  Take 1 tablet by mouth 2 (Two) Times a Day. 180 tablet 3   • nitroglycerin (NITROSTAT) 0.4 MG SL tablet 1 under the tongue as needed for angina, may repeat q5mins for up three doses 25 tablet 1     No current facility-administered medications for this visit.        Chantix [varenicline tartrate] and Keflex [cephalexin]    Past Medical History:   Diagnosis Date   • Anemia     Acute blood loss Anemia   • Arthritis    • Back pain     Chronic   • Mireles's esophagus    • Dyslipidemia    • GERD (gastroesophageal reflux disease)    • H pylori ulcer     Positive   • Hyperlipidemia    • Hypertension    • IHD (ischemic heart disease)     s/p CABG X 4-V   • Kidney stone    • Myocardial infarction (CMS/HCC) 2011   • Sleep apnea     wears CPAP   • Thrombocytopenia (CMS/HCC)    • Vitamin D deficiency        Social History     Socioeconomic History   • Marital status:      Spouse name: Not on file   • Number of children: 2   • Years of education: Not on file   • Highest education level: Not on file   Occupational History     Employer: DISABLED   Tobacco Use   • Smoking status: Current Every Day Smoker     Packs/day: 1.00     Years: 46.00     Pack years: 46.00     Types: Cigarettes   • Smokeless tobacco: Never Used   • Tobacco comment: patient reports has cut back on cigarette smoking, not ready to quit yet, counseled patient on effect's of smoking on health.   Substance and Sexual Activity   • Alcohol use: No   • Drug use: No   Social History Narrative    Lives in [place], [place] with spouse       Family History   Problem Relation Age of Onset   • Hypertension Mother    • Alzheimer's disease Mother    • Hypertension Father    • Alzheimer's disease Father    • Hypertension Other        Review of Systems   Constitution: Positive for malaise/fatigue. Negative for decreased appetite, diaphoresis and fever.   HENT: Positive for congestion (sinuses at times). Negative for ear pain, hoarse voice, nosebleeds and sore throat.     "  Eyes: Negative for redness and visual disturbance.   Cardiovascular: Positive for chest pain. Negative for claudication, leg swelling, near-syncope and palpitations.   Respiratory: Positive for cough, shortness of breath, sleep disturbances due to breathing (uses CPAP with benefit) and sputum production (only in the mornings).    Endocrine: Negative for polydipsia, polyphagia and polyuria.   Hematologic/Lymphatic: Negative for bleeding problem. Does not bruise/bleed easily.   Skin: Positive for flushing. Negative for itching and rash.   Musculoskeletal: Positive for arthritis, back pain, joint pain and stiffness. Negative for muscle cramps and myalgias.   Gastrointestinal: Positive for bloating (at times) and heartburn (at times). Negative for change in bowel habit, dysphagia, melena and nausea.        Has recently seen Dr. Foster    Genitourinary: Negative for dysuria and hematuria.   Neurological: Positive for numbness (left hip and leg with prolonged standing, resolves with sitting). Negative for dizziness.   Psychiatric/Behavioral: Negative for memory loss. The patient is nervous/anxious (\"smoking calms me down\").    Allergic/Immunologic: Positive for environmental allergies. Negative for persistent infections.        Objective     /78 (BP Location: Left arm)   Pulse 76   Ht 167.6 cm (66\")   Wt 95.3 kg (210 lb)   BMI 33.89 kg/m²     Physical Exam   Constitutional: He is oriented to person, place, and time. He appears well-nourished.   HENT:   Head: Normocephalic.   Eyes: Pupils are equal, round, and reactive to light. Conjunctivae are normal.   Neck: Normal range of motion. Neck supple. Carotid bruit is not present.   Cardiovascular: Normal rate, regular rhythm and S1 normal.   No murmur heard.  Pulses:       Radial pulses are 2+ on the right side, and 2+ on the left side.   Loud S2   Pulmonary/Chest: He has wheezes. He has no rales.   Abdominal: Soft. Bowel sounds are normal. He exhibits no " distension. There is no tenderness.   Musculoskeletal: Normal range of motion. He exhibits no edema.   Neurological: He is alert and oriented to person, place, and time.   Skin: Skin is warm. No pallor.   Psychiatric: He has a normal mood and affect. His behavior is normal.        Procedures: none today         Assessment/Plan      Sawyer was seen today for hospital follow up, shortness of breath and med refill.    Diagnoses and all orders for this visit:    Coronary artery disease of native artery of native heart with stable angina pectoris (CMS/HCC)    IHD (ischemic heart disease)    Essential hypertension    Hypercholesteremia    Hypertriglyceridemia    Obstructive sleep apnea syndrome    Cigarette smoker    Other orders  -     nitroglycerin (NITROSTAT) 0.4 MG SL tablet; 1 under the tongue as needed for angina, may repeat q5mins for up three doses      IHD/CAD- the report of his recent cardiac cath reviewed which showed both vein grafts occluded. His symptoms have improved with addition of Ranexa but still has mild angina with exertion. We discussed increasing the dose of Ranexa but patient declines at this time.  Continue Imdur 60 mg daily.  Continue 325 mg dose of daily aspirin.  Should symptoms worsen he agrees to call and medication changes will be considered.  Nitrostat updated.    HTN- blood pressure stable.  Continue carvedilol 6.25 mg twice a day.    Hypercholesterolemia/hypertriglyceridemia- currently on statin therapy in form of Lipitor and fenofibrate in form of TriCor.  He will follow with you for lab orders/management.  Please forward copy of next lab results.  We discussed importance of lipid management to prevent worsening CAD.    LLUVIA-advised to see Dr. Naqvi regarding CPAP management. I advised him to follow up for management of shortness of breath from a pulmonary standpoint.     Patient's Body mass index is 33.89 kg/m². BMI is above normal parameters. Recommendations include: educational  material.  Mediterranean diet for weight loss and DASH diet encouraged.     Sawyer Tilley  reports that he has been smoking cigarettes. He has a 46.00 pack-year smoking history. He has never used smokeless tobacco.. I have educated him on the risk of diseases from using tobacco products such as cancer, COPD and heart diease. I advised him to quit and he is not willing to quit.I spent 3  minutes counseling the patient.  Should he want to retry NicoDerm patches or other modalities for smoking cessation he agrees to contact the office.    Patient request cardiac clearance for colonoscopy to be done in July.  Clearance forwarded to Dr. Murillo for approval.    A 6-month follow-up visit scheduled.  Please call sooner for any cardiac concerns.           Electronically signed by KATHLEEN Mustafa,  May 18, 2020 16:53

## 2020-05-19 ENCOUNTER — TELEPHONE (OUTPATIENT)
Dept: CARDIOLOGY | Facility: CLINIC | Age: 60
End: 2020-05-19

## 2020-05-19 NOTE — TELEPHONE ENCOUNTER
Is needing Cardiac clearance  To have Colonoscopy per Dr. Foster .  Had Cath done 2-2020  Takes Aspirin 325 mg daily

## 2020-07-13 ENCOUNTER — TELEPHONE (OUTPATIENT)
Dept: CARDIAC SURGERY | Facility: CLINIC | Age: 60
End: 2020-07-13

## 2020-11-18 DIAGNOSIS — I25.10 CORONARY ARTERY DISEASE INVOLVING NATIVE CORONARY ARTERY OF NATIVE HEART WITHOUT ANGINA PECTORIS: ICD-10-CM

## 2020-11-19 RX ORDER — ISOSORBIDE MONONITRATE 60 MG/1
TABLET, EXTENDED RELEASE ORAL
Qty: 90 TABLET | Refills: 3 | Status: SHIPPED | OUTPATIENT
Start: 2020-11-19 | End: 2021-05-05 | Stop reason: SDUPTHER

## 2020-11-19 RX ORDER — CARVEDILOL 6.25 MG/1
6.25 TABLET ORAL 2 TIMES DAILY WITH MEALS
Qty: 180 TABLET | Refills: 0 | Status: SHIPPED | OUTPATIENT
Start: 2020-11-19 | End: 2020-12-08

## 2020-11-19 RX ORDER — FENOFIBRATE 145 MG/1
145 TABLET, COATED ORAL DAILY
Qty: 90 TABLET | Refills: 2 | Status: SHIPPED | OUTPATIENT
Start: 2020-11-19 | End: 2021-05-05 | Stop reason: SDUPTHER

## 2020-12-08 RX ORDER — RANOLAZINE 500 MG/1
500 TABLET, EXTENDED RELEASE ORAL 2 TIMES DAILY
Qty: 180 TABLET | Refills: 1 | Status: SHIPPED | OUTPATIENT
Start: 2020-12-08 | End: 2021-05-05 | Stop reason: SDUPTHER

## 2020-12-08 RX ORDER — CARVEDILOL 6.25 MG/1
6.25 TABLET ORAL 2 TIMES DAILY WITH MEALS
Qty: 180 TABLET | Refills: 1 | Status: SHIPPED | OUTPATIENT
Start: 2020-12-08 | End: 2021-04-19

## 2020-12-13 DIAGNOSIS — Z91.09 ENVIRONMENTAL ALLERGIES: ICD-10-CM

## 2020-12-13 DIAGNOSIS — E78.00 HYPERCHOLESTEREMIA: ICD-10-CM

## 2020-12-14 RX ORDER — ATORVASTATIN CALCIUM 20 MG/1
TABLET, FILM COATED ORAL
Qty: 90 TABLET | Refills: 3 | Status: SHIPPED | OUTPATIENT
Start: 2020-12-14 | End: 2021-05-05 | Stop reason: SDUPTHER

## 2020-12-14 RX ORDER — LORATADINE 10 MG/1
TABLET ORAL
Qty: 90 TABLET | Refills: 2 | Status: SHIPPED | OUTPATIENT
Start: 2020-12-14 | End: 2021-06-09

## 2021-02-05 NOTE — ADDENDUM NOTE
Addended by: JULIO CESAR PEOPLES on: 7/15/2019 11:54 AM     Modules accepted: Orders    
Adria Koroma (MD)  Orthopaedic Surgery; Surgery of the Hand  [address], [address]  [place] [address]  Phone: [phone]  Fax: [phone]  Established Patient  Follow Up Time: 1 week

## 2021-04-19 RX ORDER — CARVEDILOL 6.25 MG/1
TABLET ORAL
Qty: 180 TABLET | Refills: 1 | Status: SHIPPED | OUTPATIENT
Start: 2021-04-19 | End: 2021-05-05 | Stop reason: SDUPTHER

## 2021-04-19 RX ORDER — RANOLAZINE 500 MG/1
TABLET, EXTENDED RELEASE ORAL
Qty: 180 TABLET | Refills: 1 | OUTPATIENT
Start: 2021-04-19

## 2021-05-05 ENCOUNTER — OFFICE VISIT (OUTPATIENT)
Dept: CARDIOLOGY | Facility: CLINIC | Age: 61
End: 2021-05-05

## 2021-05-05 VITALS
HEART RATE: 56 BPM | BODY MASS INDEX: 34.23 KG/M2 | WEIGHT: 213 LBS | DIASTOLIC BLOOD PRESSURE: 92 MMHG | HEIGHT: 66 IN | TEMPERATURE: 97.8 F | SYSTOLIC BLOOD PRESSURE: 130 MMHG

## 2021-05-05 DIAGNOSIS — E78.00 HYPERCHOLESTEREMIA: ICD-10-CM

## 2021-05-05 DIAGNOSIS — E66.9 OBESITY (BMI 30.0-34.9): ICD-10-CM

## 2021-05-05 DIAGNOSIS — H53.9 VISION CHANGES: ICD-10-CM

## 2021-05-05 DIAGNOSIS — I25.118 CORONARY ARTERY DISEASE OF NATIVE ARTERY OF NATIVE HEART WITH STABLE ANGINA PECTORIS (HCC): ICD-10-CM

## 2021-05-05 DIAGNOSIS — I10 ESSENTIAL HYPERTENSION: Primary | ICD-10-CM

## 2021-05-05 DIAGNOSIS — R01.1 MURMUR, CARDIAC: ICD-10-CM

## 2021-05-05 DIAGNOSIS — G47.33 OBSTRUCTIVE SLEEP APNEA SYNDROME: ICD-10-CM

## 2021-05-05 PROCEDURE — 99214 OFFICE O/P EST MOD 30 MIN: CPT | Performed by: NURSE PRACTITIONER

## 2021-05-05 PROCEDURE — 93000 ELECTROCARDIOGRAM COMPLETE: CPT | Performed by: NURSE PRACTITIONER

## 2021-05-05 RX ORDER — ISOSORBIDE MONONITRATE 60 MG/1
60 TABLET, EXTENDED RELEASE ORAL EVERY EVENING
Qty: 90 TABLET | Refills: 3 | Status: SHIPPED | OUTPATIENT
Start: 2021-05-05 | End: 2022-05-23

## 2021-05-05 RX ORDER — ATORVASTATIN CALCIUM 20 MG/1
20 TABLET, FILM COATED ORAL NIGHTLY
Qty: 90 TABLET | Refills: 3 | Status: SHIPPED | OUTPATIENT
Start: 2021-05-05 | End: 2022-02-24

## 2021-05-05 RX ORDER — RANOLAZINE 500 MG/1
500 TABLET, EXTENDED RELEASE ORAL 2 TIMES DAILY
Qty: 180 TABLET | Refills: 2 | Status: SHIPPED | OUTPATIENT
Start: 2021-05-05 | End: 2022-01-24

## 2021-05-05 RX ORDER — FENOFIBRATE 145 MG/1
145 TABLET, COATED ORAL DAILY
Qty: 90 TABLET | Refills: 2 | Status: SHIPPED | OUTPATIENT
Start: 2021-05-05 | End: 2022-02-16 | Stop reason: SDUPTHER

## 2021-05-05 RX ORDER — CARVEDILOL 6.25 MG/1
6.25 TABLET ORAL 2 TIMES DAILY WITH MEALS
Qty: 180 TABLET | Refills: 2 | Status: SHIPPED | OUTPATIENT
Start: 2021-05-05 | End: 2022-01-20

## 2021-05-05 NOTE — PROGRESS NOTES
Chief Complaint   Patient presents with   • Follow-up     cardiac management. updated ekg on file. denies any cardiac complaints. States his SOA might be r/t cigarettes.    • Med Refill     will need cardiac meds refilled 90 days to [facility]. Brought med list with visit.   • Labs     last labs 2/20 in chart.    • Aspirin     is on daily ASA       Cardiac Complaints  none      Subjective   Sawyer Tilley is a 60 y.o. male with HTN, hyperlipidemia, and ischemic heart disease for which he underwent bypass surgery in 2011. Cardiac workup in 2014 showed only a small inferior infarct.  On 7/22/2019 Lexiscan stress test showed inferior lateral wall infarct with jhoana-infarct ischemia.  The dose of Imdur was increased to 60 mg daily with recommendation for elective cath if symptoms persisted.Echocardiogram showed left atrial cavity was dilated at 4.5 cm.  There was mild LVH.  LVEF was 56-60%.  Mild to moderate MR noted. Labs showed LDL not at goal on Simvastatin and changed to higher intensity statin in form of Lipitor.  In February 2019 he admitted to more chest pain and cardiac cath was ordered. On 3/3/20 he underwent cardiac catheterization that showed patent LIMA and PHYLLIS, but both of his vein grafts were occluded.     Patient returns today for follow up and denies any new concerns. Chest pain, dizziness, syncope, and palpitations denied. SOA reported but he thinks it is from continued tobacco abuse, no worse than prior.  Labs have been with PCP but have not been checked for a year. Refills are requested. He does continue to smoke and smokes about a pack a day,he has not been able to quit.         Cardiac History  Past Surgical History:   Procedure Laterality Date   • BACK SURGERY  02/2010   • CARDIAC CATHETERIZATION  01/08/2011    Cath-60%LAD, 90%D1, 75%LCX, 80%OM. 100%RCA.   • CARDIAC CATHETERIZATION  03/03/2020    Patent LIMA -LAD & PHYLLIS - D1. 100% SVG -OM & SVG-PDA.   • CARDIOVASCULAR STRESS TEST  11/03/2011     Stress-4min, 41sec, 84%THR, 160/86. Inferior Ischemia   • CARDIOVASCULAR STRESS TEST  09/30/2014    L.Myview-no ischemia, small fixed inferior infarct   • CARDIOVASCULAR STRESS TEST  07/22/2019    L.Cardiolite- EF 57%. Inferolateral Infarct with periinfarct Ischemia.   • CATARACT EXTRACTION Bilateral    • CORONARY ARTERY BYPASS GRAFT  01/10/2011    LIMA-LAD, SVG-OM, SVG-PDA. PHYLLIS- D1   • ECHO - CONVERTED  11/03/2011    Echo-EF 65/70%   • ECHO - CONVERTED  09/30/2004    Echo-EF 65%, mild MR, borderline pulm HTN   • ECHO - CONVERTED  07/22/2019    EF 60%. Mild- Mod MR. LA- 4.5 Cm   • HYDROCELE EXCISION / REPAIR Left 2014   • OTHER SURGICAL HISTORY  01/09/2011     Carotid US- No sig. stenosis.        Current Outpatient Medications   Medication Sig Dispense Refill   • aspirin 325 MG tablet Take 325 mg by mouth daily.     • atorvastatin (LIPITOR) 20 MG tablet Take 1 tablet by mouth Every Night. 90 tablet 3   • carvedilol (COREG) 6.25 MG tablet Take 1 tablet by mouth 2 (Two) Times a Day With Meals. 180 tablet 2   • fenofibrate (TRICOR) 145 MG tablet Take 1 tablet by mouth Daily. 90 tablet 2   • fluticasone (FLONASE) 50 MCG/ACT nasal spray 2 sprays into the nostril(s) as directed by provider As Needed.     • isosorbide mononitrate (IMDUR) 60 MG 24 hr tablet Take 1 tablet by mouth Every Evening. 90 tablet 3   • loratadine (CLARITIN) 10 MG tablet TAKE 1 TABLET EVERY DAY 90 tablet 2   • nitroglycerin (NITROSTAT) 0.4 MG SL tablet 1 under the tongue as needed for angina, may repeat q5mins for up three doses 25 tablet 1   • omeprazole (priLOSEC) 20 MG capsule Take 20 mg by mouth Daily.     • ranolazine (RANEXA) 500 MG 12 hr tablet Take 1 tablet by mouth 2 (Two) Times a Day. Needs appointment for further refills. 180 tablet 2     No current facility-administered medications for this visit.       Chantix [varenicline tartrate] and Keflex [cephalexin]    Past Medical History:   Diagnosis Date   • Anemia     Acute blood loss Anemia    • Arthritis    • Back pain     Chronic   • Mireles's esophagus    • Dyslipidemia    • GERD (gastroesophageal reflux disease)    • H pylori ulcer     Positive   • Hyperlipidemia    • Hypertension    • IHD (ischemic heart disease)     s/p CABG X 4-V   • Kidney stone    • Myocardial infarction (CMS/[facility]) 2011   • Sleep apnea     wears CPAP   • Thrombocytopenia (CMS/[facility])    • Vitamin D deficiency        Social History     Socioeconomic History   • Marital status:      Spouse name: Not on file   • Number of children: 2   • Years of education: Not on file   • Highest education level: Not on file   Tobacco Use   • Smoking status: Current Every Day Smoker     Packs/day: 1.00     Years: 46.00     Pack years: 46.00     Types: Cigarettes   • Smokeless tobacco: Never Used   • Tobacco comment: patient reports has cut back on cigarette smoking, not ready to quit yet, counseled patient on effect's of smoking on health.   Vaping Use   • Vaping Use: Never used   Substance and Sexual Activity   • Alcohol use: No   • Drug use: No       Family History   Problem Relation Age of Onset   • Hypertension Mother    • Alzheimer's disease Mother    • Hypertension Father    • Alzheimer's disease Father    • Hypertension Other        Review of Systems   Constitutional: Negative for malaise/fatigue and night sweats.   Cardiovascular: Negative for chest pain, claudication, dyspnea on exertion, irregular heartbeat, leg swelling, near-syncope, orthopnea, palpitations and syncope.   Respiratory: Positive for shortness of breath. Negative for cough and wheezing.    Gastrointestinal: Negative for anorexia, heartburn, melena, nausea and vomiting.   Genitourinary: Negative for dysuria, hematuria, hesitancy and nocturia.   Neurological: Negative for dizziness, light-headedness and loss of balance.   Psychiatric/Behavioral: Negative for depression and memory loss. The patient is not nervous/anxious.            Objective     /92 (BP Location:  "Right arm)   Pulse 56   Temp 97.8 °F (36.6 °C)   Ht 167.6 cm (66\")   Wt 96.6 kg (213 lb)   BMI 34.38 kg/m²     Constitutional:       Appearance: Healthy appearance. Not in distress.   Eyes:      Pupils: Pupils are equal, round, and reactive to light.   HENT:      Nose: Nose normal.   Pulmonary:      Effort: Pulmonary effort is normal.      Breath sounds: Normal breath sounds.   Cardiovascular:      PMI at left midclavicular line. Bradycardia present. Regular rhythm.      Murmurs: There is a systolic murmur.   Abdominal:      Palpations: Abdomen is soft.   Musculoskeletal: Normal range of motion.      Cervical back: Normal range of motion and neck supple. Skin:     General: Skin is warm and dry.   Neurological:      Mental Status: Oriented to person, place and time.           ECG 12 Lead    Date/Time: 5/5/2021 12:33 PM  Performed by: Katherine Rehman APRN  Authorized by: Katherine Rehman APRN   Comparison: compared with previous ECG from 1/13/2011  Comparison to previous ECG: ST change lateral ischemia (patient has occluded SVG grafts)  Rhythm: sinus rhythm  BPM: 56  ST segment depression noted on lead: lateral leads.    Clinical impression: abnormal EKG  Comments: Normal QT            Assessment/Plan     IHD/CAD- Most recent cardiac cath reviewed showed both vein grafts occluded. His symptoms have improved with addition of Ranexa but still has mild angina with exertion. He states it is no worse than prior. Continue Imdur 60 mg daily.  Continue 325 mg dose of daily aspirin.  He was urged to call if symptoms should worsen.     HTN- blood pressure slightly elevated, but he has not had his meds today.  Continue carvedilol 6.25 mg twice a day, BP log encouraged. Limited sodium advised.     Hypercholesterolemia/hypertriglyceridemia- currently on statin therapy in form of Lipitor and fenofibrate in form of TriCor.  He will follow with you for lab orders/management.  Please forward copy of next lab results.  Same " advised     LLUVIA-He wears CPAP at night without concerns, but has not seen pulmonary in regards.     Vision concerns:  Encouraged to discuss referral to eye doctor with you.     BMI noted at 34.38, good cardiac diet with limited carbs, calories, and activity as tolerated advised.    Tobacco abuse:  Still smoking despite concerns, cessation encouraged.     Refills per request    6 month follow up recommended or sooner if needed.      Problems Addressed this Visit        Cardiac and Vasculature    Coronary artery disease of native artery of native heart with stable angina pectoris (CMS/[facility])    Relevant Medications    carvedilol (COREG) 6.25 MG tablet    isosorbide mononitrate (IMDUR) 60 MG 24 hr tablet    ranolazine (RANEXA) 500 MG 12 hr tablet    Other Relevant Orders    ECG 12 Lead    Hypercholesteremia    Relevant Medications    atorvastatin (LIPITOR) 20 MG tablet    fenofibrate (TRICOR) 145 MG tablet    Essential hypertension - Primary    Relevant Medications    carvedilol (COREG) 6.25 MG tablet    Murmur, cardiac      Other Visit Diagnoses     Obstructive sleep apnea syndrome        Vision changes        Obesity (BMI 30.0-34.9)          Diagnoses       Codes Comments    Essential hypertension    -  Primary ICD-10-CM: I10  ICD-9-CM: 401.9     Coronary artery disease of native artery of native heart with stable angina pectoris (CMS/HCC)     ICD-10-CM: I25.118  ICD-9-CM: 414.01, 413.9     Hypercholesteremia     ICD-10-CM: E78.00  ICD-9-CM: 272.0     Murmur, cardiac     ICD-10-CM: R01.1  ICD-9-CM: 785.2     Obstructive sleep apnea syndrome     ICD-10-CM: G47.33  ICD-9-CM: 327.23     Vision changes     ICD-10-CM: H53.9  ICD-9-CM: 368.9     Obesity (BMI 30.0-34.9)     ICD-10-CM: E66.9  ICD-9-CM: 278.00           Patient's (Body mass index is 34.38 kg/m².) Obesity noted. Good cardiac diet with limited carbs, calories, and activity as tolerated advised.     Sawyer Tilley  reports that he has been smoking cigarettes. He  has a 46.00 pack-year smoking history. He has never used smokeless tobacco.Not ready to quit at present.          Electronically signed by Katherine Rehman, KATHLEEN May 5, 2021 17:43 EDT

## 2021-06-09 DIAGNOSIS — Z91.09 ENVIRONMENTAL ALLERGIES: ICD-10-CM

## 2021-06-09 RX ORDER — LORATADINE 10 MG/1
TABLET ORAL
Qty: 90 TABLET | Refills: 2 | Status: SHIPPED | OUTPATIENT
Start: 2021-06-09

## 2021-07-01 ENCOUNTER — OFFICE VISIT (OUTPATIENT)
Dept: CARDIAC SURGERY | Facility: CLINIC | Age: 61
End: 2021-07-01

## 2021-07-01 VITALS
HEIGHT: 66 IN | OXYGEN SATURATION: 98 % | BODY MASS INDEX: 33.91 KG/M2 | TEMPERATURE: 97.8 F | SYSTOLIC BLOOD PRESSURE: 142 MMHG | WEIGHT: 211 LBS | HEART RATE: 76 BPM | DIASTOLIC BLOOD PRESSURE: 78 MMHG

## 2021-07-01 DIAGNOSIS — I25.118 CORONARY ARTERY DISEASE OF NATIVE ARTERY OF NATIVE HEART WITH STABLE ANGINA PECTORIS (HCC): ICD-10-CM

## 2021-07-01 DIAGNOSIS — E78.00 HYPERCHOLESTEREMIA: ICD-10-CM

## 2021-07-01 DIAGNOSIS — R91.1 LUNG NODULE: Primary | ICD-10-CM

## 2021-07-01 DIAGNOSIS — I10 ESSENTIAL HYPERTENSION: ICD-10-CM

## 2021-07-01 PROCEDURE — 99214 OFFICE O/P EST MOD 30 MIN: CPT | Performed by: THORACIC SURGERY (CARDIOTHORACIC VASCULAR SURGERY)

## 2021-07-01 NOTE — PROGRESS NOTES
07/01/2021  Patient Information  Sawyer Tilley                                                                                          [address] [place] [address]   1960  'PCP/Referring Physician'  Germán Casey MD  No ref. provider found  Chief Complaint   Patient presents with   • Consult     Returning pt of Dr. Rudolph Cm returning per Germán Casey for lung nodules. Pt states that he is SOB with exertion and does have some fatigue.        CC: Dr. Casey says I have a new spot on my lung    History of Present Illness: 61-year-old  male last seen in this clinic in February 2020.  At that time the patient was noted to have a nodule in the peripheral aspect of the left upper lobe that measured 2.4 cm in diameter.  The nodule was oblong in shape.  There was a second small (less than 5 mm) nodule at the apex of the left upper lobe.  There was no evidence of mediastinal lymphadenopathy.  Patient was smoking at that time.  He has previously had coronary artery bypass surgery.  When seen he also gave a history of dull aching pain anteriorly with diaphoresis that lasted 3 to 4 hours.  This was not associated with syncope or nausea.  During that evaluation the patient was advised that this was likely a malignant pulmonary nodule in the left upper lobe.  The patient was scheduled for a PET scan which was obtained.  The PET scan revealed vague metabolic activity in the 2.2 cm oblong density and was otherwise negative.  The radiologist felt that the activity was less than would be expected with a neoplasm however he recommended either tissue sampling for continued follow-up CT scan with a repeat CT scan in 3 months.  I called the patient following the PET scan and again discussed options with him.  I advised him that I felt that a VATS left upper lobe wedge resection with possible left upper lobectomy would be the best course.  The patient did not want to undergo surgical intervention.  I then  advised him that per the radiologist recommendations he should have a repeat CT scan in 3 months.  I asked him to call the office and coordinate this with our scheduling clerks.  The patient states that he was aware of this but decided that since he felt well he really did not think that he needed a repeat CT scan.  He has continued close follow-up for his other medical problems including follow-up of his coronary disease and of his Mireles's esophagus.  The patient recently saw Dr. Casey for routine follow-up of his coronary disease.  Dr. Casey elicited the history of the nodule and the fact that it had not been followed up.  A CT scan of the chest was done.  The repeat CT scan shows a pleural-based left upper lobe spiculated nodule measuring about 3 cm in diameter.  Subsequently a repeat PET scan was obtained.  I have reviewed the PET scan images and the PET scan report.  There is intense activity in a 3 cm diameter lesion in the lateral aspect of the left upper lobe.  There are also 2 other nodules in the left upper lobe that have intense activity.  In addition to this there appears to be mediastinal lymphadenopathy with significant metabolic activity.  All of this is consistent with a primary lung cancer and metastatic disease.  The images on the PET scan do not delineate the anatomy precisely.  We will obtain the images from the CT scan for further review.  The patient is continuing to smoke.  He also has shortness of breath with activity.  He does not have typical anginal quality chest pain.  He has not had weight loss or hemoptysis.        Patient Active Problem List   Diagnosis   • Coronary artery disease of native artery of native heart with stable angina pectoris (CMS/HCC)   • Hypercholesteremia   • Essential hypertension   • Murmur, cardiac     Past Medical History:   Diagnosis Date   • Anemia     Acute blood loss Anemia   • Arthritis    • Back pain     Chronic   • Mireles's esophagus    •  Dyslipidemia    • GERD (gastroesophageal reflux disease)    • H pylori ulcer     Positive   • Hyperlipidemia    • Hypertension    • IHD (ischemic heart disease)     s/p CABG X 4-V   • Kidney stone    • Myocardial infarction (CMS/HCC) 2011   • Sleep apnea     wears CPAP   • Thrombocytopenia (CMS/HCC)    • Vitamin D deficiency      Past Surgical History:   Procedure Laterality Date   • BACK SURGERY  02/2010   • CARDIAC CATHETERIZATION  01/08/2011    Cath-60%LAD, 90%D1, 75%LCX, 80%OM. 100%RCA.   • CARDIAC CATHETERIZATION  03/03/2020    Patent LIMA -LAD & PHYLLIS - D1. 100% SVG -OM & SVG-PDA.   • CARDIOVASCULAR STRESS TEST  11/03/2011    Stress-4min, 41sec, 84%THR, 160/86. Inferior Ischemia   • CARDIOVASCULAR STRESS TEST  09/30/2014    L.[place]-no ischemia, small fixed inferior infarct   • CARDIOVASCULAR STRESS TEST  07/22/2019    L.Cardiolite- EF 57%. Inferolateral Infarct with periinfarct Ischemia.   • CATARACT EXTRACTION Bilateral    • CORONARY ARTERY BYPASS GRAFT  01/10/2011    LIMA-LAD, SVG-OM, SVG-PDA. PHYLLSI- D1   • ECHO - CONVERTED  11/03/2011    Echo-EF 65/70%   • ECHO - CONVERTED  09/30/2004    Echo-EF 65%, mild MR, borderline pulm HTN   • ECHO - CONVERTED  07/22/2019    EF 60%. Mild- Mod MR. LA- 4.5 Cm   • HYDROCELE EXCISION / REPAIR Left 2014   • OTHER SURGICAL HISTORY  01/09/2011     Carotid US- No sig. stenosis.        Current Outpatient Medications:   •  aspirin 325 MG tablet, Take 325 mg by mouth daily., Disp: , Rfl:   •  atorvastatin (LIPITOR) 20 MG tablet, Take 1 tablet by mouth Every Night., Disp: 90 tablet, Rfl: 3  •  carvedilol (COREG) 6.25 MG tablet, Take 1 tablet by mouth 2 (Two) Times a Day With Meals., Disp: 180 tablet, Rfl: 2  •  fenofibrate (TRICOR) 145 MG tablet, Take 1 tablet by mouth Daily., Disp: 90 tablet, Rfl: 2  •  fluticasone (FLONASE) 50 MCG/ACT nasal spray, 2 sprays into the nostril(s) as directed by provider As Needed., Disp: , Rfl:   •  isosorbide mononitrate (IMDUR) 60 MG 24 hr tablet,  Take 1 tablet by mouth Every Evening., Disp: 90 tablet, Rfl: 3  •  loratadine (CLARITIN) 10 MG tablet, TAKE 1 TABLET EVERY DAY, Disp: 90 tablet, Rfl: 2  •  nitroglycerin (NITROSTAT) 0.4 MG SL tablet, 1 under the tongue as needed for angina, may repeat q5mins for up three doses, Disp: 25 tablet, Rfl: 1  •  omeprazole (priLOSEC) 20 MG capsule, Take 20 mg by mouth Daily., Disp: , Rfl:   •  ranolazine (RANEXA) 500 MG 12 hr tablet, Take 1 tablet by mouth 2 (Two) Times a Day. Needs appointment for further refills., Disp: 180 tablet, Rfl: 2  Allergies   Allergen Reactions   • Chantix [Varenicline Tartrate] Other (See Comments)     dreams   • Keflex [Cephalexin] Itching     Social History     Socioeconomic History   • Marital status:      Spouse name: Not on file   • Number of children: 2   • Years of education: Not on file   • Highest education level: Not on file   Tobacco Use   • Smoking status: Current Every Day Smoker     Packs/day: 1.00     Years: 46.00     Pack years: 46.00     Types: Cigarettes   • Smokeless tobacco: Never Used   • Tobacco comment: patient reports has cut back on cigarette smoking, not ready to quit yet, counseled patient on effect's of smoking on health. Pt states that  he is quitting todat 7/1/2021   Vaping Use   • Vaping Use: Never used   Substance and Sexual Activity   • Alcohol use: No   • Drug use: No     Family History   Problem Relation Age of Onset   • Hypertension Mother    • Alzheimer's disease Mother    • Hypertension Father    • Alzheimer's disease Father    • Hypertension Other        ROS, past medical history, surgical history, family history, social history and vitals  reviewed by me and corrected as needed.        Review of Systems   Constitutional: Positive for malaise/fatigue. Negative for chills, fever, night sweats and weight loss.   HENT: Positive for congestion. Negative for hearing loss, nosebleeds and odynophagia.    Cardiovascular: Positive for chest pain (some chest  "pain and tenderness) and dyspnea on exertion. Negative for claudication, leg swelling, orthopnea, palpitations and syncope.   Respiratory: Positive for cough and sputum production. Negative for hemoptysis, shortness of breath and wheezing.    Endocrine: Negative for cold intolerance, heat intolerance, polydipsia, polyphagia and polyuria.   Hematologic/Lymphatic: Bruises/bleeds easily (top of both hands very easy to bleed).   Skin: Negative for itching, poor wound healing and rash.   Musculoskeletal: Positive for arthritis, back pain and muscle cramps (bilateral lower leg). Negative for joint pain, joint swelling and myalgias.   Gastrointestinal: Negative for abdominal pain, constipation, diarrhea, hematemesis, melena, nausea and vomiting.   Genitourinary: Negative for dysuria, frequency, hematuria, nocturia and urgency.   Neurological: Negative for dizziness, light-headedness, loss of balance and numbness.   Psychiatric/Behavioral: Negative for depression and suicidal ideas. The patient is not nervous/anxious.    Allergic/Immunologic: Positive for environmental allergies. Negative for HIV exposure.       Vitals:    07/01/21 1050   BP: 142/78   Pulse: 76   Temp: 97.8 °F (36.6 °C)   SpO2: 98%   Weight: 95.7 kg (211 lb)   Height: 167.6 cm (66\")        Physical Exam  Vitals and nursing note reviewed.   Constitutional:       General: He is not in acute distress.     Appearance: Normal appearance. He is obese.   HENT:      Head: Normocephalic and atraumatic.      Right Ear: External ear normal.      Left Ear: External ear normal.      Nose: Nose normal.      Mouth/Throat:      Mouth: Mucous membranes are moist.   Eyes:      Extraocular Movements: Extraocular movements intact.      Pupils: Pupils are equal, round, and reactive to light.   Neck:      Vascular: No carotid bruit.   Cardiovascular:      Rate and Rhythm: Normal rate and regular rhythm.      Pulses: Normal pulses.      Heart sounds: Normal heart sounds. No " murmur heard.     Pulmonary:      Effort: Pulmonary effort is normal. No respiratory distress.      Breath sounds: No wheezing, rhonchi or rales.   Abdominal:      General: Abdomen is flat. Bowel sounds are normal.      Palpations: Abdomen is soft. There is no mass.      Tenderness: There is no abdominal tenderness. There is no guarding.   Musculoskeletal:         General: No swelling or tenderness.      Cervical back: Normal range of motion. No rigidity. No muscular tenderness.      Right lower leg: No edema.      Left lower leg: No edema.   Lymphadenopathy:      Cervical: No cervical adenopathy.   Skin:     General: Skin is warm and dry.      Capillary Refill: Capillary refill takes less than 2 seconds.      Coloration: Skin is not jaundiced.      Findings: No erythema.   Neurological:      General: No focal deficit present.      Mental Status: He is alert and oriented to person, place, and time. Mental status is at baseline.   Psychiatric:         Mood and Affect: Mood normal.         Behavior: Behavior normal.         Thought Content: Thought content normal.         Judgment: Judgment normal.         Labs: None    Imaging: PET scan images and report reviewed as noted in the present illness    Assessment: Probable left upper lobe malignant lesion with metastasis in the left upper lobe and in the mediastinum.    Plan: I have discussed the situation in detail with the patient.  I have explained to him that tissue diagnosis is imperative.  I would like to reviewed the CT scan images and have requested a CT disc.  We will schedule the patient for bronchoscopy and endobronchial ultrasound with mediastinal biopsy.  Once I have reviewed the CT scan and once we have done EBUS we can proceed with a treatment plan. I have reviewed, verified, and confirmed the above history and current status.  I have examined the patient and confirmed the above physical findings.Above plan and treatment regimen discussed in detail with  patient.  Options of treatment, attendant risks vs benefits, and my recommendations were discussed and all questions answered.    Rudolph Cm MD  [facility]  07/06/21   09:16 EDT

## 2021-07-02 ENCOUNTER — PREP FOR SURGERY (OUTPATIENT)
Dept: OTHER | Facility: HOSPITAL | Age: 61
End: 2021-07-02

## 2021-07-02 DIAGNOSIS — Z00.6 EXAMINATION FOR NORMAL COMPARISON FOR CLINICAL RESEARCH: Primary | ICD-10-CM

## 2021-07-02 DIAGNOSIS — R91.8 LUNG MASS: Primary | ICD-10-CM

## 2021-07-02 PROBLEM — R91.1 LUNG NODULE: Status: ACTIVE | Noted: 2021-07-02

## 2021-07-07 ENCOUNTER — PRE-ADMISSION TESTING (OUTPATIENT)
Dept: PREADMISSION TESTING | Facility: HOSPITAL | Age: 61
End: 2021-07-07

## 2021-07-07 ENCOUNTER — HOSPITAL ENCOUNTER (OUTPATIENT)
Dept: GENERAL RADIOLOGY | Facility: HOSPITAL | Age: 61
Discharge: HOME | End: 2021-07-07

## 2021-07-07 VITALS — HEIGHT: 66 IN | WEIGHT: 208.8 LBS | BODY MASS INDEX: 33.56 KG/M2

## 2021-07-07 DIAGNOSIS — R91.8 LUNG MASS: ICD-10-CM

## 2021-07-07 LAB
ALBUMIN SERPL-MCNC: 4.7 G/DL (ref 3.5–5.2)
ALBUMIN/GLOB SERPL: 2.4 G/DL
ALP SERPL-CCNC: 58 U/L (ref 39–117)
ALT SERPL W P-5'-P-CCNC: 29 U/L (ref 1–41)
ANION GAP SERPL CALCULATED.3IONS-SCNC: 16 MMOL/L (ref 5–15)
APTT PPP: 27.8 SECONDS (ref 22–39)
AST SERPL-CCNC: 17 U/L (ref 1–40)
BILIRUB SERPL-MCNC: 0.4 MG/DL (ref 0–1.2)
BUN SERPL-MCNC: 17 MG/DL (ref 8–23)
BUN/CREAT SERPL: 12.5 (ref 7–25)
CALCIUM SPEC-SCNC: 9.7 MG/DL (ref 8.6–10.5)
CHLORIDE SERPL-SCNC: 102 MMOL/L (ref 98–107)
CO2 SERPL-SCNC: 24 MMOL/L (ref 22–29)
CREAT SERPL-MCNC: 1.36 MG/DL (ref 0.76–1.27)
DEPRECATED RDW RBC AUTO: 41.6 FL (ref 37–54)
ERYTHROCYTE [DISTWIDTH] IN BLOOD BY AUTOMATED COUNT: 12.2 % (ref 12.3–15.4)
GFR SERPL CREATININE-BSD FRML MDRD: 53 ML/MIN/1.73
GLOBULIN UR ELPH-MCNC: 2 GM/DL
GLUCOSE SERPL-MCNC: 94 MG/DL (ref 65–99)
HBA1C MFR BLD: 5.8 % (ref 4.8–5.6)
HCT VFR BLD AUTO: 47.5 % (ref 37.5–51)
HGB BLD-MCNC: 15.9 G/DL (ref 13–17.7)
INR PPP: 0.99 (ref 0.85–1.16)
MCH RBC QN AUTO: 30.9 PG (ref 26.6–33)
MCHC RBC AUTO-ENTMCNC: 33.5 G/DL (ref 31.5–35.7)
MCV RBC AUTO: 92.4 FL (ref 79–97)
PLATELET # BLD AUTO: 142 10*3/MM3 (ref 140–450)
PMV BLD AUTO: 11.9 FL (ref 6–12)
POTASSIUM SERPL-SCNC: 4.3 MMOL/L (ref 3.5–5.2)
PROT SERPL-MCNC: 6.7 G/DL (ref 6–8.5)
PROTHROMBIN TIME: 12.8 SECONDS (ref 11.4–14.4)
RBC # BLD AUTO: 5.14 10*6/MM3 (ref 4.14–5.8)
SARS-COV-2 RNA PNL SPEC NAA+PROBE: NOT DETECTED
SODIUM SERPL-SCNC: 142 MMOL/L (ref 136–145)
WBC # BLD AUTO: 6.43 10*3/MM3 (ref 3.4–10.8)

## 2021-07-07 PROCEDURE — C9803 HOPD COVID-19 SPEC COLLECT: HCPCS

## 2021-07-07 PROCEDURE — 85027 COMPLETE CBC AUTOMATED: CPT

## 2021-07-07 PROCEDURE — U0004 COV-19 TEST NON-CDC HGH THRU: HCPCS

## 2021-07-07 PROCEDURE — 85730 THROMBOPLASTIN TIME PARTIAL: CPT

## 2021-07-07 PROCEDURE — 71046 X-RAY EXAM CHEST 2 VIEWS: CPT

## 2021-07-07 PROCEDURE — 83036 HEMOGLOBIN GLYCOSYLATED A1C: CPT

## 2021-07-07 PROCEDURE — 85610 PROTHROMBIN TIME: CPT

## 2021-07-07 PROCEDURE — 80053 COMPREHEN METABOLIC PANEL: CPT

## 2021-07-07 PROCEDURE — 36415 COLL VENOUS BLD VENIPUNCTURE: CPT

## 2021-07-07 NOTE — PAT
EKG 5/5/21 at Dr Santos's office for routine office visit.  Patient sees cardiologist every six months.  Copy of last office notes from 5/5/21 on chart.     COVID in PAT    Patient directed to Radiology Department for CXR after Pre Admission Testing Appointment.     Patient instructed to remove all jewelry for procedure.  Patient unable to remove wedding band.

## 2021-07-07 NOTE — DISCHARGE INSTRUCTIONS
The following information and instructions were given:    Do not eat, drink, smoke or chew gum after midnight the night before surgery. This includes no mints.  Take all routine, prescribed medications including heart and blood pressure medicines with a sip of water unless otherwise instructed by your physician.   Do NOT take diabetic medication unless instructed by your physician.    DO NOT shave for two days before your procedure.  Do not wear makeup.      DO NOT wear fingernail polish (gel/regular) and/or acrylic/artificial nails on the day of surgery.   If you had a recent manicure and would rather not remove polish or artificial nails, the minimum requirement is that the polish/artificial nails must be removed from the middle finger on each hand.      If you are having surgery/procedure on an upper extremity, fingernail polish/artificial fingernails must be removed for surgery.  NO EXCEPTIONS.      If you are having surgery/procedure on a lower extremity, toenail polish on both extremities must be removed for surgery.  NO EXCEPTIONS.    Remove all jewelry (advise to go to jeweler if unable to remove).  Jewelry, especially rings, can no longer be taped for surgery.    Leave anything you consider valuable at home.    Leave your suitcase in the car until after your surgery.    Bring the following with you the day of your procedure (when applicable):       -Picture ID and insurance cards       -Co-pay/deductible required by insurance       -Medications in the original bottles (not a list) including all over-the-counter medications if not brought to PAT       -Copy of advance directive, living will or power of  documents if not brought to PAT       -CPAP or BIPAP mask and tubing (do not bring machine)       -Skin prep instruction(s) sheet       -PAT Pass    Education booklet, brochure, handout or binder related to procedure given to patient.  Education booklet also includes general information related to  their recovery that mentions signs and symptoms of infection and when to call the doctor.    When applicable, an ERAS handout/booklet was given to patient.    Pain Control After Surgery handout given to patient.

## 2021-07-08 ENCOUNTER — ANESTHESIA EVENT (OUTPATIENT)
Dept: GASTROENTEROLOGY | Facility: HOSPITAL | Age: 61
End: 2021-07-08

## 2021-07-08 RX ORDER — FAMOTIDINE 20 MG/1
20 TABLET, FILM COATED ORAL ONCE
Status: CANCELLED | OUTPATIENT
Start: 2021-07-08 | End: 2021-07-08

## 2021-07-08 RX ORDER — SODIUM CHLORIDE 0.9 % (FLUSH) 0.9 %
10 SYRINGE (ML) INJECTION EVERY 12 HOURS SCHEDULED
Status: CANCELLED | OUTPATIENT
Start: 2021-07-08

## 2021-07-08 RX ORDER — LIDOCAINE HYDROCHLORIDE 10 MG/ML
0.5 INJECTION, SOLUTION EPIDURAL; INFILTRATION; INTRACAUDAL; PERINEURAL ONCE AS NEEDED
Status: CANCELLED | OUTPATIENT
Start: 2021-07-08

## 2021-07-08 RX ORDER — SODIUM CHLORIDE, SODIUM LACTATE, POTASSIUM CHLORIDE, CALCIUM CHLORIDE 600; 310; 30; 20 MG/100ML; MG/100ML; MG/100ML; MG/100ML
9 INJECTION, SOLUTION INTRAVENOUS CONTINUOUS
Status: CANCELLED | OUTPATIENT
Start: 2021-07-08

## 2021-07-09 ENCOUNTER — ANESTHESIA (OUTPATIENT)
Dept: GASTROENTEROLOGY | Facility: HOSPITAL | Age: 61
End: 2021-07-09

## 2021-07-09 ENCOUNTER — HOSPITAL ENCOUNTER (OUTPATIENT)
Facility: HOSPITAL | Age: 61
Setting detail: SURGERY ADMIT
Discharge: HOME | End: 2021-07-09
Attending: THORACIC SURGERY (CARDIOTHORACIC VASCULAR SURGERY) | Admitting: THORACIC SURGERY (CARDIOTHORACIC VASCULAR SURGERY)

## 2021-07-09 VITALS
OXYGEN SATURATION: 95 % | SYSTOLIC BLOOD PRESSURE: 122 MMHG | HEART RATE: 51 BPM | RESPIRATION RATE: 16 BRPM | TEMPERATURE: 96.4 F | DIASTOLIC BLOOD PRESSURE: 77 MMHG

## 2021-07-09 DIAGNOSIS — R91.8 LUNG MASS: ICD-10-CM

## 2021-07-09 PROCEDURE — 31652 BRONCH EBUS SAMPLNG 1/2 NODE: CPT | Performed by: THORACIC SURGERY (CARDIOTHORACIC VASCULAR SURGERY)

## 2021-07-09 PROCEDURE — 25010000002 DEXAMETHASONE PER 1 MG: Performed by: NURSE ANESTHETIST, CERTIFIED REGISTERED

## 2021-07-09 PROCEDURE — 25010000002 NEOSTIGMINE 10 MG/10ML SOLUTION: Performed by: NURSE ANESTHETIST, CERTIFIED REGISTERED

## 2021-07-09 PROCEDURE — 25010000002 FENTANYL CITRATE (PF) 50 MCG/ML SOLUTION: Performed by: NURSE ANESTHETIST, CERTIFIED REGISTERED

## 2021-07-09 PROCEDURE — C1726 CATH, BAL DIL, NON-VASCULAR: HCPCS | Performed by: THORACIC SURGERY (CARDIOTHORACIC VASCULAR SURGERY)

## 2021-07-09 PROCEDURE — S0260 H&P FOR SURGERY: HCPCS | Performed by: THORACIC SURGERY (CARDIOTHORACIC VASCULAR SURGERY)

## 2021-07-09 PROCEDURE — 25010000002 PROPOFOL 10 MG/ML EMULSION: Performed by: NURSE ANESTHETIST, CERTIFIED REGISTERED

## 2021-07-09 PROCEDURE — 25010000002 ONDANSETRON PER 1 MG: Performed by: NURSE ANESTHETIST, CERTIFIED REGISTERED

## 2021-07-09 RX ORDER — IPRATROPIUM BROMIDE AND ALBUTEROL SULFATE 2.5; .5 MG/3ML; MG/3ML
3 SOLUTION RESPIRATORY (INHALATION) ONCE AS NEEDED
Status: DISCONTINUED | OUTPATIENT
Start: 2021-07-09 | End: 2021-07-09 | Stop reason: HOSPADM

## 2021-07-09 RX ORDER — FENTANYL CITRATE 50 UG/ML
INJECTION, SOLUTION INTRAMUSCULAR; INTRAVENOUS AS NEEDED
Status: DISCONTINUED | OUTPATIENT
Start: 2021-07-09 | End: 2021-07-09 | Stop reason: SURG

## 2021-07-09 RX ORDER — SODIUM CHLORIDE 0.9 % (FLUSH) 0.9 %
10 SYRINGE (ML) INJECTION AS NEEDED
Status: DISCONTINUED | OUTPATIENT
Start: 2021-07-09 | End: 2021-07-09 | Stop reason: HOSPADM

## 2021-07-09 RX ORDER — PROPOFOL 10 MG/ML
VIAL (ML) INTRAVENOUS AS NEEDED
Status: DISCONTINUED | OUTPATIENT
Start: 2021-07-09 | End: 2021-07-09 | Stop reason: SURG

## 2021-07-09 RX ORDER — ONDANSETRON 2 MG/ML
4 INJECTION INTRAMUSCULAR; INTRAVENOUS ONCE AS NEEDED
Status: DISCONTINUED | OUTPATIENT
Start: 2021-07-09 | End: 2021-07-09 | Stop reason: HOSPADM

## 2021-07-09 RX ORDER — GLYCOPYRROLATE 0.2 MG/ML
INJECTION INTRAMUSCULAR; INTRAVENOUS AS NEEDED
Status: DISCONTINUED | OUTPATIENT
Start: 2021-07-09 | End: 2021-07-09 | Stop reason: SURG

## 2021-07-09 RX ORDER — DEXAMETHASONE SODIUM PHOSPHATE 4 MG/ML
INJECTION, SOLUTION INTRA-ARTICULAR; INTRALESIONAL; INTRAMUSCULAR; INTRAVENOUS; SOFT TISSUE AS NEEDED
Status: DISCONTINUED | OUTPATIENT
Start: 2021-07-09 | End: 2021-07-09 | Stop reason: SURG

## 2021-07-09 RX ORDER — LIDOCAINE HYDROCHLORIDE 10 MG/ML
INJECTION, SOLUTION EPIDURAL; INFILTRATION; INTRACAUDAL; PERINEURAL AS NEEDED
Status: DISCONTINUED | OUTPATIENT
Start: 2021-07-09 | End: 2021-07-09 | Stop reason: SURG

## 2021-07-09 RX ORDER — FAMOTIDINE 10 MG/ML
20 INJECTION, SOLUTION INTRAVENOUS ONCE
Status: DISCONTINUED | OUTPATIENT
Start: 2021-07-09 | End: 2021-07-09

## 2021-07-09 RX ORDER — ROCURONIUM BROMIDE 10 MG/ML
INJECTION, SOLUTION INTRAVENOUS AS NEEDED
Status: DISCONTINUED | OUTPATIENT
Start: 2021-07-09 | End: 2021-07-09 | Stop reason: SURG

## 2021-07-09 RX ORDER — ONDANSETRON 2 MG/ML
INJECTION INTRAMUSCULAR; INTRAVENOUS AS NEEDED
Status: DISCONTINUED | OUTPATIENT
Start: 2021-07-09 | End: 2021-07-09 | Stop reason: SURG

## 2021-07-09 RX ORDER — SODIUM CHLORIDE 9 MG/ML
9 INJECTION, SOLUTION INTRAVENOUS CONTINUOUS
Status: DISCONTINUED | OUTPATIENT
Start: 2021-07-09 | End: 2021-07-09 | Stop reason: HOSPADM

## 2021-07-09 RX ORDER — MIDAZOLAM HYDROCHLORIDE 1 MG/ML
1 INJECTION INTRAMUSCULAR; INTRAVENOUS
Status: DISCONTINUED | OUTPATIENT
Start: 2021-07-09 | End: 2021-07-09 | Stop reason: HOSPADM

## 2021-07-09 RX ORDER — NEOSTIGMINE METHYLSULFATE 1 MG/ML
INJECTION, SOLUTION INTRAVENOUS AS NEEDED
Status: DISCONTINUED | OUTPATIENT
Start: 2021-07-09 | End: 2021-07-09 | Stop reason: SURG

## 2021-07-09 RX ORDER — BUPIVACAINE HCL/0.9 % NACL/PF 0.125 %
PLASTIC BAG, INJECTION (ML) EPIDURAL AS NEEDED
Status: DISCONTINUED | OUTPATIENT
Start: 2021-07-09 | End: 2021-07-09 | Stop reason: SURG

## 2021-07-09 RX ADMIN — Medication 80 MCG: at 07:58

## 2021-07-09 RX ADMIN — Medication 80 MCG: at 08:06

## 2021-07-09 RX ADMIN — LIDOCAINE HYDROCHLORIDE 100 MG: 10 INJECTION, SOLUTION EPIDURAL; INFILTRATION; INTRACAUDAL; PERINEURAL at 07:45

## 2021-07-09 RX ADMIN — GLYCOPYRROLATE 0.2 MG: 0.4 INJECTION INTRAMUSCULAR; INTRAVENOUS at 08:14

## 2021-07-09 RX ADMIN — ONDANSETRON 4 MG: 2 INJECTION INTRAMUSCULAR; INTRAVENOUS at 08:09

## 2021-07-09 RX ADMIN — FENTANYL CITRATE 25 MCG: 50 INJECTION, SOLUTION INTRAMUSCULAR; INTRAVENOUS at 07:44

## 2021-07-09 RX ADMIN — SODIUM CHLORIDE, PRESERVATIVE FREE 10 ML: 5 INJECTION INTRAVENOUS at 07:07

## 2021-07-09 RX ADMIN — SODIUM CHLORIDE 9 ML/HR: 9 INJECTION, SOLUTION INTRAVENOUS at 07:07

## 2021-07-09 RX ADMIN — DEXAMETHASONE SODIUM PHOSPHATE 4 MG: 4 INJECTION, SOLUTION INTRA-ARTICULAR; INTRALESIONAL; INTRAMUSCULAR; INTRAVENOUS; SOFT TISSUE at 08:09

## 2021-07-09 RX ADMIN — PROPOFOL 150 MG: 10 INJECTION, EMULSION INTRAVENOUS at 07:45

## 2021-07-09 RX ADMIN — NEOSTIGMINE 3 MG: 1 INJECTION INTRAVENOUS at 08:14

## 2021-07-09 RX ADMIN — GLYCOPYRROLATE 0.2 MG: 0.4 INJECTION INTRAMUSCULAR; INTRAVENOUS at 07:58

## 2021-07-09 RX ADMIN — ROCURONIUM BROMIDE 30 MG: 10 INJECTION, SOLUTION INTRAVENOUS at 07:45

## 2021-07-09 NOTE — ANESTHESIA POSTPROCEDURE EVALUATION
Patient: Sawyer Tilley    Procedure Summary     Date: 07/09/21 Room / Location:  [facility]    Anesthesia Start: 0742 Anesthesia Stop: 0835    Procedure: BRONCHOSCOPY WITH ENDOBRONCHIAL ULTRASOUND (N/A Bronchus) Diagnosis:       Lung nodule      (Lung nodule [R91.1])    Surgeons: Rudolph Cm MD Provider: Rock Singer Jr., MD    Anesthesia Type: general ASA Status: 3          Anesthesia Type: general    Vitals  Vitals Value Taken Time   /63 07/09/21 0830   Temp 96.4 °F (35.8 °C) 07/09/21 0824   Pulse 50 07/09/21 0834   Resp 14 07/09/21 0830   SpO2 97 % 07/09/21 0834   Vitals shown include unvalidated device data.        Post Anesthesia Care and Evaluation    Patient location during evaluation: PACU  Patient participation: complete - patient participated  Level of consciousness: sleepy but conscious  Pain score: 0  Pain management: satisfactory to patient  Airway patency: patent  Anesthetic complications: No anesthetic complications  PONV Status: none  Cardiovascular status: acceptable, hemodynamically stable and bradycardic  Respiratory status: acceptable, nasal cannula, nonlabored ventilation and spontaneous ventilation  Hydration status: acceptable

## 2021-07-09 NOTE — ANESTHESIA PROCEDURE NOTES
Airway  Urgency: elective    Date/Time: 7/9/2021 7:55 AM  Airway not difficult    General Information and Staff    Patient location during procedure: OR    Indications and Patient Condition  Indications for airway management: airway protection    Preoxygenated: yes  Mask difficulty assessment: 1 - vent by mask    Final Airway Details  Final airway type: endotracheal airway      Cuffed: yes   Successful intubation technique: direct laryngoscopy  Facilitating devices/methods: intubating stylet  Endotracheal tube insertion site: oral  Blade: Luis M  Blade size: 3  Cormack-Lehane Classification: grade I - full view of glottis  Placement verified by: chest auscultation and capnometry   Measured from: lips  ETT/EBT  to lips (cm): 21  Number of attempts at approach: 1  Assessment: lips, teeth, and gum same as pre-op and atraumatic intubation

## 2021-07-09 NOTE — H&P
CTS  H&P  Sawyer Tilley  [id number]  1960    Patient Care Team:  Germán Casey MD as PCP - General (Family Medicine)  Milton Murillo MD as Consulting Physician (Cardiology)    CC: I feel fine          HPI:Illness: 61-year-old  male last seen in this clinic in February 2020.  At that time the patient was noted to have a nodule in the peripheral aspect of the left upper lobe that measured 2.4 cm in diameter.  The nodule was oblong in shape.  There was a second small (less than 5 mm) nodule at the apex of the left upper lobe.  There was no evidence of mediastinal lymphadenopathy.  Patient was smoking at that time.  He has previously had coronary artery bypass surgery.  When seen he also gave a history of dull aching pain anteriorly with diaphoresis that lasted 3 to 4 hours.  This was not associated with syncope or nausea.  During that evaluation the patient was advised that this was likely a malignant pulmonary nodule in the left upper lobe.  The patient was scheduled for a PET scan which was obtained.  The PET scan revealed vague metabolic activity in the 2.2 cm oblong density and was otherwise negative.  The radiologist felt that the activity was less than would be expected with a neoplasm however he recommended either tissue sampling for continued follow-up CT scan with a repeat CT scan in 3 months.  I called the patient following the PET scan and again discussed options with him.  I advised him that I felt that a VATS left upper lobe wedge resection with possible left upper lobectomy would be the best course.  The patient did not want to undergo surgical intervention.  I then advised him that per the radiologist recommendations he should have a repeat CT scan in 3 months.  I asked him to call the office and coordinate this with our scheduling clerks.  The patient states that he was aware of this but decided that since he felt well he really did not think that he needed a repeat CT scan.   He has continued close follow-up for his other medical problems including follow-up of his coronary disease and of his Mireles's esophagus.  The patient recently saw Dr. Casey for routine follow-up of his coronary disease.  Dr. Casey elicited the history of the nodule and the fact that it had not been followed up.  A CT scan of the chest was done.  The repeat CT scan shows a pleural-based left upper lobe spiculated nodule measuring about 3 cm in diameter.  Subsequently a repeat PET scan was obtained.  I have reviewed the PET scan images and the PET scan report.  There is intense activity in a 3 cm diameter lesion in the lateral aspect of the left upper lobe.  There are also 2 other nodules in the left upper lobe that have intense activity.  In addition to this there appears to be mediastinal lymphadenopathy with significant metabolic activity.  All of this is consistent with a primary lung cancer and metastatic disease.  The images on the PET scan do not delineate the anatomy precisely.  We will obtain the images from the CT scan for further review.  The patient is continuing to smoke.  He also has shortness of breath with activity.  He does not have typical anginal quality chest pain.  He has not had weight loss or hemoptysis.      Lung nodule      Review of Systems:  General: No anorexia, no weight loss, general malaise and weakness.  No fever chills or night sweats.  HEENT: No headaches no visual changes no hearing loss no rhinitis no pharyngitis  Pulmonary: No shortness of breath, no cough, no hemoptysis  Heart: No palpitations,  No malaise or shortness of breath, no atrial fibrillation, no bradycardia, no syncope.  No anginal quality chest pain.  Gastrointestinal: No nausea, vomiting, diarrhea, or constipation. No acholic stool, no jaundice.  Renal: No dysuria, no frequency, no hematuria.  Skin: No rash, no skin lesions, no skin tumors.  Neurologic: No seizures, no muscle weakness, no sensory deficit, no  amaurosis,  Psychiatric: No anxiety, no history of psychosis  Hematologic: No bleeding history, no ease of bruising, no history of blood disorder.  All other systems were reviewed and are negative.    History  Past Medical History:   Diagnosis Date   • Anemia     Acute blood loss Anemia   • Arthritis     back   • Back pain     Chronic   • Mireles's esophagus    • Coronary artery disease 2011    NO STENTS; 4 graft CABG; MI before CABG; recent heart cath shows two grafts are blocked;    • Dyslipidemia    • GERD (gastroesophageal reflux disease)    • H pylori ulcer     Positive   • History of kidney stones     one (passed)   • Hyperlipidemia    • Hypertension    • IHD (ischemic heart disease)     s/p CABG X 4-V   • Lung mass 02/2020    routine CT scan revealed mass in 2020; monitored regularly; recent CT scan 2021 showed additional abnormalities    • Myocardial infarction (CMS/HCC) 2011   • Sleep apnea     wears CPAP   • Thrombocytopenia (CMS/HCC)    • Tinnitus    • Vitamin D deficiency      Past Surgical History:   Procedure Laterality Date   • BACK SURGERY  02/2010   • CARDIAC CATHETERIZATION  01/08/2011    Cath-60%LAD, 90%D1, 75%LCX, 80%OM. 100%RCA.   • CARDIAC CATHETERIZATION  03/03/2020    Patent LIMA -LAD & PHYLLIS - D1. 100% SVG -OM & SVG-PDA.   • CARDIOVASCULAR STRESS TEST  11/03/2011    Stress-4min, 41sec, 84%THR, 160/86. Inferior Ischemia   • CARDIOVASCULAR STRESS TEST  09/30/2014    L.[place]-no ischemia, small fixed inferior infarct   • CARDIOVASCULAR STRESS TEST  07/22/2019    L.[place]iol[place]- EF 57%. Inferolateral Infarct with periinfarct Ischemia.   • CATARACT EXTRACTION Bilateral    • COLONOSCOPY     • CORONARY ARTERY BYPASS GRAFT  01/10/2011    LIMA-LAD, SVG-OM, SVG-PDA. PHYLLIS- D1   • ECHO - CONVERTED  11/03/2011    Echo-EF 65/70%   • ECHO - CONVERTED  09/30/2004    Echo-EF 65%, mild MR, borderline pulm HTN   • ECHO - CONVERTED  07/22/2019    EF 60%. Mild- Mod MR. LA- 4.5 Cm   • ENDOSCOPY     • HYDROCELE  EXCISION / REPAIR Left 2014   • OTHER SURGICAL HISTORY  01/09/2011     Carotid US- No sig. stenosis.      Family History   Problem Relation Age of Onset   • Hypertension Mother    • Alzheimer's disease Mother    • Hypertension Father    • Alzheimer's disease Father    • Hypertension Other      Social History     Tobacco Use   • Smoking status: Current Every Day Smoker     Packs/day: 1.00     Years: 46.00     Pack years: 46.00     Types: Cigarettes   • Smokeless tobacco: Never Used   • Tobacco comment: patient reports has cut back on cigarette smoking, not ready to quit yet, counseled patient on effect's of smoking on health. Pt states that  he is quitting todat 7/1/2021   Vaping Use   • Vaping Use: Never used   Substance Use Topics   • Alcohol use: No   • Drug use: No     Medications Prior to Admission   Medication Sig Dispense Refill Last Dose   • aspirin 325 MG tablet Take 325 mg by mouth daily.   7/8/2021 at 0830   • carvedilol (COREG) 6.25 MG tablet Take 1 tablet by mouth 2 (Two) Times a Day With Meals. 180 tablet 2 7/9/2021 at 0430   • fenofibrate (TRICOR) 145 MG tablet Take 1 tablet by mouth Daily. 90 tablet 2 7/9/2021 at Unknown time   • fluticasone (FLONASE) 50 MCG/ACT nasal spray 2 sprays into the nostril(s) as directed by provider As Needed.   Past Week at Unknown time   • loratadine (CLARITIN) 10 MG tablet TAKE 1 TABLET EVERY DAY 90 tablet 2 7/9/2021 at Unknown time   • omeprazole (priLOSEC) 20 MG capsule Take 20 mg by mouth Daily.   7/9/2021 at Unknown time   • ranolazine (RANEXA) 500 MG 12 hr tablet Take 1 tablet by mouth 2 (Two) Times a Day. Needs appointment for further refills. 180 tablet 2 7/9/2021 at Unknown time   • atorvastatin (LIPITOR) 20 MG tablet Take 1 tablet by mouth Every Night. 90 tablet 3 7/6/2021   • isosorbide mononitrate (IMDUR) 60 MG 24 hr tablet Take 1 tablet by mouth Every Evening. 90 tablet 3 7/6/2021     Allergies:  Chantix [varenicline tartrate] and Keflex [cephalexin]    The  ROS, past medical history, surgical history, family history, social history and vitals were reviewed by myself and corrected as needed.      Objective    Vital Signs  Temp:  [96.8 °F (36 °C)] 96.8 °F (36 °C)  Heart Rate:  [52] 52  Resp:  [16] 16  BP: (140)/(94) 140/94    Physical Exam:  General Appearance: alert, appears stated age and cooperative  Head: normocephalic, without obvious abnormality and atraumatic  Ears/Nose: no rhinitis, external ears normal  Throat:  no oral lesions, no pharyngitis  Neck: no adenopathy, suppple, trachea midline, no thyromegaly, no carotid bruit and no JVD  Lungs: clear to auscultation, respirations regular, respirations even and respirations unlabored  Heart: regular rhythm & normal rate, normal S1, S2, no murmur  Abdomen: normal bowel sounds, no masses, soft, non-tender  Extremities: moves extremities well and no edema  Pulses: pulses palpable and equal bilaterally (femoral, DP, PT)  Skin: no bleeding, bruising or rash  Neurologic: mental status orientated to person, place, time and situation, CN intact, no motor or sensory loss          Data Review:  Results from last 7 days   Lab Units 07/07/21  1543   WBC 10*3/mm3 6.43   HEMOGLOBIN g/dL 15.9   HEMATOCRIT % 47.5   PLATELETS 10*3/mm3 142     Results from last 7 days   Lab Units 07/07/21  1543   SODIUM mmol/L 142   POTASSIUM mmol/L 4.3   CHLORIDE mmol/L 102   CO2 mmol/L 24.0   BUN mg/dL 17   CREATININE mg/dL 1.36*   GLUCOSE mg/dL 94   CALCIUM mg/dL 9.7     Coagulation:   INR   Date Value Ref Range Status   07/07/2021 0.99 0.85 - 1.16 Final         Imaging Results (Last 72 Hours)     ** No results found for the last 72 hours. **            Imaging: Recent PET scan from outside facility reviewed.  The patient has several nodules in the left lung with significant metabolic enhancement on PET.  There is also some enlargement of the mediastinal nodes although SUV uptake is not as intense.        Assessment: Probable metastatic non-small  cell carcinoma of the lung      Plan:  Proceed with bronchoscopy and endobronchial ultrasound for possible mediastinal biopsy.  If no significant mediastinal nodes were encountered will then need to obtain tissue from the lung with either a needle biopsy or a VATS wedge.      I have reviewed, verified, and confirmed the above history and current status.  I have examined the patient and confirmed the above physical findings.Above plan and treatment regimen discussed in detail with patient.  Options of treatment, attendant risks vs benefits, and my recommendations were discussed and all questions answered.      Rudolph Cm MD  07/09/21  07:48 EDT

## 2021-07-09 NOTE — OP NOTE
Operative Report      Sawyer Tilley    : 1960  MRN: [id number]  [facility]: [id number]      Date:21      Preop Diagnosis: Multiple left upper lobe pulmonary nodules      Postop Diagnosis: Multiple left upper lobe pulmonary nodules      Procedure:  Bronchoscopy  EBUS      Surgeon: Rudolph Cm MD      Assistant:  none        Indications: This gentleman has several PET positive nodules in the left upper lobe including a large lesion in the lateral aspect of the left upper lobe.  He has mild enlargement of what appears to be a level 5 node and a 4R lymph node.  Station 7 appears normal.  He would like to avoid surgical intervention if at all possible.  We plan to proceed with bronchoscopy and endobronchial ultrasound examination with biopsy if we can identify a large lymph node in the mediastinum.      Operative Findings: Bronchoscopy was normal.  Endobronchial ultrasound did not reveal any significant enlarged mediastinal lymph nodes.      EBL: Less than 2 cc      Operative Narrative: The patient was brought to the endoscopy suite and prepared for intervention in the usual manner.  A timeout was called.  The patient's identity, the proposed procedure, and the availability of appropriate equipment and personnel was verified.  Following inhalation endotracheal anesthesia the fiberoptic bronchoscope was introduced.  The trachea was normal the gary was sharp and in the midline. The right mainstem bronchus, bronchus intermedius, right upper lobe, right middle lobe, and right lower lobe with all respective segmental takeoffs was inspected and noted to be normal.  The scope was pulled back to the gary and passed down the left side.  The left mainstem, left upper lobe, lingula, and left lower lobe with all segmental takeoffs was inspected and noted to be normal.  The bronchoscope was removed. The endobronchial ultrasound scope was introduced.  Survey of the paratracheal regions, the subcarinal region, and  level 10 regions bilaterally revealed no significant lymphadenopathy.   Inspection revealed no evidence of bleeding.  The scope was withdrawn.  The patient was awakened and sent to recovery in stable condition.      Rudolph Cm MD  CTSurgery  07/09/21   08:20 EDT

## 2021-07-09 NOTE — ANESTHESIA PREPROCEDURE EVALUATION
Anesthesia Evaluation     Patient summary reviewed and Nursing notes reviewed   no history of anesthetic complications:  NPO Solid Status: > 8 hours  NPO Liquid Status: > 2 hours           Airway   Mallampati: II  TM distance: >3 FB  Neck ROM: full  No difficulty expected  Dental - normal exam     Pulmonary - normal exam   (+) sleep apnea,     ROS comment: Lung nodule  Cardiovascular - normal exam    ECG reviewed  Patient on routine beta blocker and Beta blocker given within 24 hours of surgery    (+) hypertension, CAD, CABG >6 Months, hyperlipidemia,     ROS comment: [facility] in 2020, 2 occluded SVG, medical management recommended. LVEF lower limit of normal.    Neuro/Psych- negative ROS  (-) seizures, CVA  GI/Hepatic/Renal/Endo    (+)  GERD, PUD,    (-) liver disease, no renal disease, diabetes, no thyroid disorder    Musculoskeletal     (+) back pain,   Abdominal    Substance History      OB/GYN          Other   arthritis,                      Anesthesia Plan    ASA 3     general     intravenous induction     Anesthetic plan, all risks, benefits, and alternatives have been provided, discussed and informed consent has been obtained with: patient.    Plan discussed with CRNA.

## 2021-07-09 NOTE — DISCHARGE INSTRUCTIONS
Flexible Bronchoscopy, Care After  This sheet gives you information about how to care for yourself after your procedure. Your health care provider may also give you more specific instructions. If you have problems or questions, contact your health care provider.  What can I expect after the procedure?  After the procedure, it is common to have the following symptoms for 24-48 hours:  · A cough that is worse than it was before the procedure.  · A low-grade fever.  · A sore throat or hoarse voice.  · Small streaks of blood in the mucus from your lungs (sputum), if tissue samples were removed (biopsy).  Follow these instructions at home:  Eating and drinking  · Do not eat or drink anything (including water) for 2 hours after your procedure, or until your numbing medicine (local anesthetic) has worn off. Having a numb throat increases your risk of burning yourself or choking.  · After your numbness is gone and your cough and gag reflexes have returned, you may start eating only soft foods and slowly drinking liquids.  · The day after the procedure, return to your normal diet.  Driving  · Do not drive for 24 hours if you were given a medicine to help you relax (sedative).  · Do not drive or use heavy machinery while taking prescription pain medicine.  General instructions    · Take over-the-counter and prescription medicines only as told by your health care provider.  · Return to your normal activities as told by your health care provider. Ask your health care provider what activities are safe for you.  · Do not use any products that contain nicotine or tobacco, such as cigarettes and e-cigarettes. If you need help quitting, ask your health care provider.  · Keep all follow-up visits as told by your health care provider. This is important, especially if you had a biopsy taken.  Get help right away if:  · You have shortness of breath that gets worse.  · You become light-headed or feel like you might faint.  · You have  chest pain.  · You cough up more than a small amount of blood.  · The amount of blood you cough up increases.  Summary  · Common symptoms in the 24-48 hours following a flexible bronchoscopy include cough, low-grade fever, sore throat or hoarse voice, and blood-streaked mucus from the lungs (if you had a biopsy).  · Do not eat or drink anything (including water) for 2 hours after your procedure, or until your local anesthetic has worn off. You can return to your normal diet the day after the procedure.  · Get help right away if you develop worsening shortness of breath, have chest pain, become light-headed, or cough up more than a small amount of blood.  This information is not intended to replace advice given to you by your health care provider. Make sure you discuss any questions you have with your health care provider.  Document Revised: 11/30/2018 Document Reviewed: 01/05/2018  [employer] Patient Education © 2021 [employer]  Moderate Conscious Sedation, Adult  Sedation is the use of medicines to promote relaxation and to relieve discomfort and anxiety. Moderate conscious sedation is a type of sedation. Under moderate conscious sedation, you are less alert than normal, but you are still able to respond to instructions, touch, or both.  Moderate conscious sedation is used during short medical and dental procedures. It is milder than deep sedation, which is a type of sedation under which you cannot be easily woken up. It is also milder than general anesthesia, which is the use of medicines to make you unconscious. Moderate conscious sedation allows you to return to your regular activities sooner.  Tell a health care provider about:  · Any allergies you have.  · All medicines you are taking, including vitamins, herbs, eye drops, creams, and over-the-counter medicines.  · Any use of steroids. This includes steroids taken by mouth or as a cream.  · Any problems you or family members have had with sedatives and  anesthetic medicines.  · Any blood disorders you have.  · Any surgeries you have had.  · Any medical conditions you have, such as sleep apnea.  · Whether you are pregnant or may be pregnant.  · Any use of cigarettes, alcohol, marijuana, or drugs.  What are the risks?  Generally, this is a safe procedure. However, problems may occur, including:  · Getting too much medicine (oversedation).  · Nausea.  · Allergic reaction to medicines.  · Trouble breathing. If this happens, a breathing tube may be used. It will be removed when you are awake and breathing on your own.  · Heart trouble.  · Lung trouble.  · Confusion that gets better with time (emergence delirium).  What happens before the procedure?  Staying hydrated  Follow instructions from your health care provider about hydration, which may include:  · Up to 2 hours before the procedure - you may continue to drink clear liquids, such as water, clear fruit juice, black coffee, and plain tea.  Eating and drinking restrictions  Follow instructions from your health care provider about eating and drinking, which may include:  · 8 hours before the procedure - stop eating heavy meals or foods, such as meat, fried foods, or fatty foods.  · 6 hours before the procedure - stop eating light meals or foods, such as toast or cereal.  · 6 hours before the procedure - stop drinking milk or drinks that contain milk.  · 2 hours before the procedure - stop drinking clear liquids.  Medicines  Ask your health care provider about:  · Changing or stopping your regular medicines. This is especially important if you are taking diabetes medicines or blood thinners.  · Taking medicines such as aspirin and ibuprofen. These medicines can thin your blood. Do not take these medicines unless your health care provider tells you to take them.  · Taking over-the-counter medicines, vitamins, herbs, and supplements.  Tests and exams  · You will have a physical exam.  · You may have blood tests done to  show how well:  ? Your kidneys and liver work.  ? Your blood clots.  General instructions  · Plan to have someone take you home from the hospital or clinic.  · If you will be going home right after the procedure, plan to have someone with you for 24 hours.  What happens during the procedure?    · You will be given the sedative. The sedative may be given:  ? As a pill that you will swallow. It can also be inserted into the rectum.  ? As a spray through the nose.  ? As an injection into the muscle.  ? As an injection into the vein through an IV.  · You may be given oxygen as needed.  · Your breathing, heart rate, and blood pressure will be monitored during the procedure.  · The medical or dental procedure will be done.  The procedure may vary among health care providers and hospitals.  What happens after the procedure?  · Your blood pressure, heart rate, breathing rate, and blood oxygen level will be monitored until you leave the hospital or clinic.  · You will get fluids through your IV if needed.  · Do not drive or operate machinery until your health care provider says that it is safe.  Summary  · Sedation is the use of medicines to promote relaxation and to relieve discomfort and anxiety. Moderate conscious sedation is a type of sedation that is used during short medical and dental procedures.  · Tell the health care provider about any medical conditions that you have and about all the medicines that you are taking.  · You will be given the sedative as a pill, a spray through the nose, an injection into the muscle, or an injection into the vein through an IV. Vital signs are monitored during the sedation.  · Moderate conscious sedation allows you to return to your regular activities sooner.  This information is not intended to replace advice given to you by your health care provider. Make sure you discuss any questions you have with your health care provider.  Document Revised: 11/12/2020 Document Reviewed:  11/12/2020  [employer] Patient Education © 2021 [employer]

## 2021-07-13 DIAGNOSIS — R91.1 LUNG NODULE: Primary | ICD-10-CM

## 2021-07-16 ENCOUNTER — PREP FOR SURGERY (OUTPATIENT)
Dept: OTHER | Facility: HOSPITAL | Age: 61
End: 2021-07-16

## 2021-07-16 DIAGNOSIS — R91.1 LUNG NODULE: Primary | ICD-10-CM

## 2021-07-16 RX ORDER — CHLORHEXIDINE GLUCONATE 500 MG/1
1 CLOTH TOPICAL EVERY 12 HOURS PRN
Status: CANCELLED | OUTPATIENT
Start: 2021-07-20

## 2021-07-20 ENCOUNTER — PRE-ADMISSION TESTING (OUTPATIENT)
Dept: PREADMISSION TESTING | Facility: HOSPITAL | Age: 61
End: 2021-07-20

## 2021-07-20 ENCOUNTER — HOSPITAL ENCOUNTER (OUTPATIENT)
Dept: GENERAL RADIOLOGY | Facility: HOSPITAL | Age: 61
Discharge: HOME | End: 2021-07-20

## 2021-07-20 ENCOUNTER — HOSPITAL ENCOUNTER (OUTPATIENT)
Dept: PULMONOLOGY | Facility: HOSPITAL | Age: 61
Discharge: HOME | End: 2021-07-20

## 2021-07-20 VITALS — WEIGHT: 209.6 LBS | BODY MASS INDEX: 33.68 KG/M2 | HEIGHT: 66 IN | OXYGEN SATURATION: 96 %

## 2021-07-20 DIAGNOSIS — R91.1 LUNG NODULE: ICD-10-CM

## 2021-07-20 LAB
ABO GROUP BLD: NORMAL
ALBUMIN SERPL-MCNC: 4.2 G/DL (ref 3.5–5.2)
ALP SERPL-CCNC: 56 U/L (ref 39–117)
ALT SERPL W P-5'-P-CCNC: 23 U/L (ref 1–41)
ANION GAP SERPL CALCULATED.3IONS-SCNC: 13 MMOL/L (ref 5–15)
AST SERPL-CCNC: 18 U/L (ref 1–40)
BASOPHILS # BLD AUTO: 0.05 10*3/MM3 (ref 0–0.2)
BASOPHILS NFR BLD AUTO: 0.7 % (ref 0–1.5)
BILIRUB CONJ SERPL-MCNC: <0.2 MG/DL (ref 0–0.3)
BILIRUB INDIRECT SERPL-MCNC: NORMAL MG/DL
BILIRUB SERPL-MCNC: 0.3 MG/DL (ref 0–1.2)
BLD GP AB SCN SERPL QL: NEGATIVE
BUN SERPL-MCNC: 17 MG/DL (ref 8–23)
BUN/CREAT SERPL: 14.8 (ref 7–25)
CALCIUM SPEC-SCNC: 9.3 MG/DL (ref 8.6–10.5)
CHLORIDE SERPL-SCNC: 103 MMOL/L (ref 98–107)
CO2 SERPL-SCNC: 23 MMOL/L (ref 22–29)
CREAT SERPL-MCNC: 1.15 MG/DL (ref 0.76–1.27)
DEPRECATED RDW RBC AUTO: 42.2 FL (ref 37–54)
EOSINOPHIL # BLD AUTO: 0.13 10*3/MM3 (ref 0–0.4)
EOSINOPHIL NFR BLD AUTO: 1.9 % (ref 0.3–6.2)
ERYTHROCYTE [DISTWIDTH] IN BLOOD BY AUTOMATED COUNT: 12.3 % (ref 12.3–15.4)
FLUAV SUBTYP SPEC NAA+PROBE: NOT DETECTED
FLUBV RNA ISLT QL NAA+PROBE: NOT DETECTED
GFR SERPL CREATININE-BSD FRML MDRD: 64 ML/MIN/1.73
GLUCOSE SERPL-MCNC: 121 MG/DL (ref 65–99)
HCT VFR BLD AUTO: 46.9 % (ref 37.5–51)
HGB BLD-MCNC: 15.8 G/DL (ref 13–17.7)
IMM GRANULOCYTES # BLD AUTO: 0.03 10*3/MM3 (ref 0–0.05)
IMM GRANULOCYTES NFR BLD AUTO: 0.4 % (ref 0–0.5)
LYMPHOCYTES # BLD AUTO: 1.76 10*3/MM3 (ref 0.7–3.1)
LYMPHOCYTES NFR BLD AUTO: 25.4 % (ref 19.6–45.3)
MCH RBC QN AUTO: 31.5 PG (ref 26.6–33)
MCHC RBC AUTO-ENTMCNC: 33.7 G/DL (ref 31.5–35.7)
MCV RBC AUTO: 93.6 FL (ref 79–97)
MONOCYTES # BLD AUTO: 0.5 10*3/MM3 (ref 0.1–0.9)
MONOCYTES NFR BLD AUTO: 7.2 % (ref 5–12)
NEUTROPHILS NFR BLD AUTO: 4.46 10*3/MM3 (ref 1.7–7)
NEUTROPHILS NFR BLD AUTO: 64.4 % (ref 42.7–76)
NRBC BLD AUTO-RTO: 0 /100 WBC (ref 0–0.2)
PLATELET # BLD AUTO: 149 10*3/MM3 (ref 140–450)
PMV BLD AUTO: 11.8 FL (ref 6–12)
POTASSIUM SERPL-SCNC: 4.1 MMOL/L (ref 3.5–5.2)
PROT SERPL-MCNC: 6.3 G/DL (ref 6–8.5)
RBC # BLD AUTO: 5.01 10*6/MM3 (ref 4.14–5.8)
RH BLD: POSITIVE
SARS-COV-2 RNA PNL SPEC NAA+PROBE: NOT DETECTED
SODIUM SERPL-SCNC: 139 MMOL/L (ref 136–145)
T&S EXPIRATION DATE: NORMAL
WBC # BLD AUTO: 6.93 10*3/MM3 (ref 3.4–10.8)

## 2021-07-20 PROCEDURE — 94010 BREATHING CAPACITY TEST: CPT | Performed by: INTERNAL MEDICINE

## 2021-07-20 PROCEDURE — C9803 HOPD COVID-19 SPEC COLLECT: HCPCS

## 2021-07-20 PROCEDURE — 86850 RBC ANTIBODY SCREEN: CPT

## 2021-07-20 PROCEDURE — 86923 COMPATIBILITY TEST ELECTRIC: CPT

## 2021-07-20 PROCEDURE — 94010 BREATHING CAPACITY TEST: CPT

## 2021-07-20 PROCEDURE — 71046 X-RAY EXAM CHEST 2 VIEWS: CPT

## 2021-07-20 PROCEDURE — 80048 BASIC METABOLIC PNL TOTAL CA: CPT

## 2021-07-20 PROCEDURE — 87636 SARSCOV2 & INF A&B AMP PRB: CPT

## 2021-07-20 PROCEDURE — 36415 COLL VENOUS BLD VENIPUNCTURE: CPT

## 2021-07-20 PROCEDURE — 80076 HEPATIC FUNCTION PANEL: CPT

## 2021-07-20 PROCEDURE — 86900 BLOOD TYPING SEROLOGIC ABO: CPT

## 2021-07-20 PROCEDURE — 85025 COMPLETE CBC W/AUTO DIFF WBC: CPT

## 2021-07-20 PROCEDURE — 86901 BLOOD TYPING SEROLOGIC RH(D): CPT

## 2021-07-22 ENCOUNTER — ANESTHESIA EVENT (OUTPATIENT)
Dept: PERIOP | Facility: HOSPITAL | Age: 61
End: 2021-07-22

## 2021-07-22 RX ORDER — FAMOTIDINE 10 MG/ML
20 INJECTION, SOLUTION INTRAVENOUS ONCE
Status: CANCELLED | OUTPATIENT
Start: 2021-07-22 | End: 2021-07-22

## 2021-07-23 ENCOUNTER — ANESTHESIA (OUTPATIENT)
Dept: PERIOP | Facility: HOSPITAL | Age: 61
End: 2021-07-23

## 2021-07-23 ENCOUNTER — APPOINTMENT (OUTPATIENT)
Dept: GENERAL RADIOLOGY | Facility: HOSPITAL | Age: 61
End: 2021-07-23

## 2021-07-23 ENCOUNTER — HOSPITAL ENCOUNTER (INPATIENT)
Facility: HOSPITAL | Age: 61
LOS: 4 days | Discharge: HOME | End: 2021-07-27
Attending: THORACIC SURGERY (CARDIOTHORACIC VASCULAR SURGERY) | Admitting: THORACIC SURGERY (CARDIOTHORACIC VASCULAR SURGERY)

## 2021-07-23 DIAGNOSIS — R91.1 LUNG NODULE: ICD-10-CM

## 2021-07-23 PROBLEM — E66.811 CLASS 1 OBESITY IN ADULT: Status: ACTIVE | Noted: 2021-07-23

## 2021-07-23 PROBLEM — Z99.89 OSA ON CPAP: Status: ACTIVE | Noted: 2021-07-23

## 2021-07-23 PROBLEM — F17.200 SMOKER: Status: ACTIVE | Noted: 2021-07-23

## 2021-07-23 PROBLEM — G47.33 OSA ON CPAP: Chronic | Status: ACTIVE | Noted: 2021-07-23

## 2021-07-23 PROBLEM — Z99.89 OSA ON CPAP: Chronic | Status: ACTIVE | Noted: 2021-07-23

## 2021-07-23 PROBLEM — E66.9 CLASS 1 OBESITY IN ADULT: Status: ACTIVE | Noted: 2021-07-23

## 2021-07-23 PROBLEM — R73.02 IMPAIRED GLUCOSE TOLERANCE: Status: ACTIVE | Noted: 2021-07-23

## 2021-07-23 PROBLEM — E66.811 CLASS 1 OBESITY IN ADULT: Chronic | Status: ACTIVE | Noted: 2021-07-23

## 2021-07-23 PROBLEM — G47.33 OSA ON CPAP: Status: ACTIVE | Noted: 2021-07-23

## 2021-07-23 PROBLEM — E66.9 CLASS 1 OBESITY IN ADULT: Chronic | Status: ACTIVE | Noted: 2021-07-23

## 2021-07-23 PROBLEM — F17.200 SMOKER: Chronic | Status: ACTIVE | Noted: 2021-07-23

## 2021-07-23 LAB
ABO GROUP BLD: NORMAL
RH BLD: POSITIVE

## 2021-07-23 PROCEDURE — 32608 THORACOSCOPY W/BX NODULE: CPT | Performed by: STUDENT IN AN ORGANIZED HEALTH CARE EDUCATION/TRAINING PROGRAM

## 2021-07-23 PROCEDURE — 88331 PATH CONSLTJ SURG 1 BLK 1SPC: CPT | Performed by: PATHOLOGY

## 2021-07-23 PROCEDURE — 25010000002 PROPOFOL 10 MG/ML EMULSION: Performed by: NURSE ANESTHETIST, CERTIFIED REGISTERED

## 2021-07-23 PROCEDURE — 0BBJ4ZX EXCISION OF LEFT LOWER LUNG LOBE, PERCUTANEOUS ENDOSCOPIC APPROACH, DIAGNOSTIC: ICD-10-PCS | Performed by: THORACIC SURGERY (CARDIOTHORACIC VASCULAR SURGERY)

## 2021-07-23 PROCEDURE — 87102 FUNGUS ISOLATION CULTURE: CPT | Performed by: THORACIC SURGERY (CARDIOTHORACIC VASCULAR SURGERY)

## 2021-07-23 PROCEDURE — 88307 TISSUE EXAM BY PATHOLOGIST: CPT | Performed by: THORACIC SURGERY (CARDIOTHORACIC VASCULAR SURGERY)

## 2021-07-23 PROCEDURE — C1889 IMPLANT/INSERT DEVICE, NOC: HCPCS | Performed by: THORACIC SURGERY (CARDIOTHORACIC VASCULAR SURGERY)

## 2021-07-23 PROCEDURE — 0BBG4ZX EXCISION OF LEFT UPPER LUNG LOBE, PERCUTANEOUS ENDOSCOPIC APPROACH, DIAGNOSTIC: ICD-10-PCS | Performed by: THORACIC SURGERY (CARDIOTHORACIC VASCULAR SURGERY)

## 2021-07-23 PROCEDURE — 25010000002 ONDANSETRON PER 1 MG: Performed by: NURSE ANESTHETIST, CERTIFIED REGISTERED

## 2021-07-23 PROCEDURE — 25010000002 NEOSTIGMINE 10 MG/10ML SOLUTION: Performed by: NURSE ANESTHETIST, CERTIFIED REGISTERED

## 2021-07-23 PROCEDURE — 25010000002 DEXAMETHASONE PER 1 MG: Performed by: NURSE ANESTHETIST, CERTIFIED REGISTERED

## 2021-07-23 PROCEDURE — 25010000002 FENTANYL CITRATE (PF) 50 MCG/ML SOLUTION: Performed by: NURSE ANESTHETIST, CERTIFIED REGISTERED

## 2021-07-23 PROCEDURE — 86901 BLOOD TYPING SEROLOGIC RH(D): CPT

## 2021-07-23 PROCEDURE — 87158 CULTURE TYPING ADDED METHOD: CPT

## 2021-07-23 PROCEDURE — 87176 TISSUE HOMOGENIZATION CULTR: CPT | Performed by: THORACIC SURGERY (CARDIOTHORACIC VASCULAR SURGERY)

## 2021-07-23 PROCEDURE — 32608 THORACOSCOPY W/BX NODULE: CPT | Performed by: THORACIC SURGERY (CARDIOTHORACIC VASCULAR SURGERY)

## 2021-07-23 PROCEDURE — 87205 SMEAR GRAM STAIN: CPT | Performed by: THORACIC SURGERY (CARDIOTHORACIC VASCULAR SURGERY)

## 2021-07-23 PROCEDURE — 0BJ08ZZ INSPECTION OF TRACHEOBRONCHIAL TREE, VIA NATURAL OR ARTIFICIAL OPENING ENDOSCOPIC: ICD-10-PCS | Performed by: THORACIC SURGERY (CARDIOTHORACIC VASCULAR SURGERY)

## 2021-07-23 PROCEDURE — C1729 CATH, DRAINAGE: HCPCS | Performed by: THORACIC SURGERY (CARDIOTHORACIC VASCULAR SURGERY)

## 2021-07-23 PROCEDURE — 86900 BLOOD TYPING SEROLOGIC ABO: CPT

## 2021-07-23 PROCEDURE — 25010000002 MORPHINE PER 10 MG: Performed by: PHYSICIAN ASSISTANT

## 2021-07-23 PROCEDURE — 71045 X-RAY EXAM CHEST 1 VIEW: CPT

## 2021-07-23 PROCEDURE — 99232 SBSQ HOSP IP/OBS MODERATE 35: CPT | Performed by: INTERNAL MEDICINE

## 2021-07-23 PROCEDURE — 25010000002 VANCOMYCIN 10 G RECONSTITUTED SOLUTION: Performed by: PHYSICIAN ASSISTANT

## 2021-07-23 PROCEDURE — 87206 SMEAR FLUORESCENT/ACID STAI: CPT | Performed by: THORACIC SURGERY (CARDIOTHORACIC VASCULAR SURGERY)

## 2021-07-23 PROCEDURE — 88312 SPECIAL STAINS GROUP 1: CPT | Performed by: THORACIC SURGERY (CARDIOTHORACIC VASCULAR SURGERY)

## 2021-07-23 PROCEDURE — 87116 MYCOBACTERIA CULTURE: CPT | Performed by: THORACIC SURGERY (CARDIOTHORACIC VASCULAR SURGERY)

## 2021-07-23 PROCEDURE — 87075 CULTR BACTERIA EXCEPT BLOOD: CPT | Performed by: THORACIC SURGERY (CARDIOTHORACIC VASCULAR SURGERY)

## 2021-07-23 PROCEDURE — 87070 CULTURE OTHR SPECIMN AEROBIC: CPT | Performed by: THORACIC SURGERY (CARDIOTHORACIC VASCULAR SURGERY)

## 2021-07-23 PROCEDURE — 25010000002 HYDROMORPHONE PER 4 MG: Performed by: NURSE ANESTHETIST, CERTIFIED REGISTERED

## 2021-07-23 DEVICE — ENDOPATH ECHELON ENDOSCOPIC LINEAR CUTTER RELOADS, GREEN, 60MM
Type: IMPLANTABLE DEVICE | Site: CHEST | Status: FUNCTIONAL
Brand: ECHELON ENDOPATH

## 2021-07-23 RX ORDER — BISACODYL 10 MG
10 SUPPOSITORY, RECTAL RECTAL DAILY PRN
Status: DISCONTINUED | OUTPATIENT
Start: 2021-07-23 | End: 2021-07-27 | Stop reason: HOSPADM

## 2021-07-23 RX ORDER — LIDOCAINE HYDROCHLORIDE 10 MG/ML
INJECTION, SOLUTION EPIDURAL; INFILTRATION; INTRACAUDAL; PERINEURAL AS NEEDED
Status: DISCONTINUED | OUTPATIENT
Start: 2021-07-23 | End: 2021-07-23 | Stop reason: SURG

## 2021-07-23 RX ORDER — MIDAZOLAM HYDROCHLORIDE 1 MG/ML
1 INJECTION INTRAMUSCULAR; INTRAVENOUS
Status: DISCONTINUED | OUTPATIENT
Start: 2021-07-23 | End: 2021-07-23 | Stop reason: HOSPADM

## 2021-07-23 RX ORDER — GLYCOPYRROLATE 0.2 MG/ML
INJECTION INTRAMUSCULAR; INTRAVENOUS AS NEEDED
Status: DISCONTINUED | OUTPATIENT
Start: 2021-07-23 | End: 2021-07-23 | Stop reason: SURG

## 2021-07-23 RX ORDER — RANOLAZINE 500 MG/1
500 TABLET, EXTENDED RELEASE ORAL 2 TIMES DAILY
Status: DISCONTINUED | OUTPATIENT
Start: 2021-07-23 | End: 2021-07-27 | Stop reason: HOSPADM

## 2021-07-23 RX ORDER — SODIUM CHLORIDE, SODIUM LACTATE, POTASSIUM CHLORIDE, CALCIUM CHLORIDE 600; 310; 30; 20 MG/100ML; MG/100ML; MG/100ML; MG/100ML
100 INJECTION, SOLUTION INTRAVENOUS CONTINUOUS
Status: DISCONTINUED | OUTPATIENT
Start: 2021-07-23 | End: 2021-07-24

## 2021-07-23 RX ORDER — FENTANYL CITRATE 50 UG/ML
INJECTION, SOLUTION INTRAMUSCULAR; INTRAVENOUS AS NEEDED
Status: DISCONTINUED | OUTPATIENT
Start: 2021-07-23 | End: 2021-07-23 | Stop reason: SURG

## 2021-07-23 RX ORDER — SODIUM CHLORIDE 9 MG/ML
30 INJECTION, SOLUTION INTRAVENOUS CONTINUOUS
Status: DISCONTINUED | OUTPATIENT
Start: 2021-07-23 | End: 2021-07-24

## 2021-07-23 RX ORDER — LABETALOL HYDROCHLORIDE 5 MG/ML
INJECTION, SOLUTION INTRAVENOUS AS NEEDED
Status: DISCONTINUED | OUTPATIENT
Start: 2021-07-23 | End: 2021-07-23 | Stop reason: SURG

## 2021-07-23 RX ORDER — BUPIVACAINE HYDROCHLORIDE AND EPINEPHRINE 2.5; 5 MG/ML; UG/ML
INJECTION, SOLUTION INFILTRATION; PERINEURAL AS NEEDED
Status: DISCONTINUED | OUTPATIENT
Start: 2021-07-23 | End: 2021-07-23 | Stop reason: HOSPADM

## 2021-07-23 RX ORDER — ONDANSETRON 2 MG/ML
4 INJECTION INTRAMUSCULAR; INTRAVENOUS ONCE AS NEEDED
Status: DISCONTINUED | OUTPATIENT
Start: 2021-07-23 | End: 2021-07-23 | Stop reason: HOSPADM

## 2021-07-23 RX ORDER — SODIUM CHLORIDE 0.9 % (FLUSH) 0.9 %
10 SYRINGE (ML) INJECTION AS NEEDED
Status: DISCONTINUED | OUTPATIENT
Start: 2021-07-23 | End: 2021-07-23 | Stop reason: HOSPADM

## 2021-07-23 RX ORDER — MORPHINE SULFATE 2 MG/ML
2 INJECTION, SOLUTION INTRAMUSCULAR; INTRAVENOUS
Status: DISCONTINUED | OUTPATIENT
Start: 2021-07-23 | End: 2021-07-27 | Stop reason: HOSPADM

## 2021-07-23 RX ORDER — NALOXONE HCL 0.4 MG/ML
0.4 VIAL (ML) INJECTION AS NEEDED
Status: DISCONTINUED | OUTPATIENT
Start: 2021-07-23 | End: 2021-07-23 | Stop reason: HOSPADM

## 2021-07-23 RX ORDER — HYDROMORPHONE HYDROCHLORIDE 1 MG/ML
0.5 INJECTION, SOLUTION INTRAMUSCULAR; INTRAVENOUS; SUBCUTANEOUS
Status: DISCONTINUED | OUTPATIENT
Start: 2021-07-23 | End: 2021-07-23 | Stop reason: HOSPADM

## 2021-07-23 RX ORDER — MAGNESIUM HYDROXIDE 1200 MG/15ML
LIQUID ORAL AS NEEDED
Status: DISCONTINUED | OUTPATIENT
Start: 2021-07-23 | End: 2021-07-23 | Stop reason: HOSPADM

## 2021-07-23 RX ORDER — ONDANSETRON 2 MG/ML
INJECTION INTRAMUSCULAR; INTRAVENOUS AS NEEDED
Status: DISCONTINUED | OUTPATIENT
Start: 2021-07-23 | End: 2021-07-23 | Stop reason: SURG

## 2021-07-23 RX ORDER — FAMOTIDINE 20 MG/1
20 TABLET, FILM COATED ORAL ONCE
Status: COMPLETED | OUTPATIENT
Start: 2021-07-23 | End: 2021-07-23

## 2021-07-23 RX ORDER — CHLORHEXIDINE GLUCONATE 500 MG/1
1 CLOTH TOPICAL EVERY 12 HOURS PRN
Status: DISCONTINUED | OUTPATIENT
Start: 2021-07-23 | End: 2021-07-23 | Stop reason: HOSPADM

## 2021-07-23 RX ORDER — CARVEDILOL 6.25 MG/1
6.25 TABLET ORAL 2 TIMES DAILY WITH MEALS
Status: DISCONTINUED | OUTPATIENT
Start: 2021-07-23 | End: 2021-07-27 | Stop reason: HOSPADM

## 2021-07-23 RX ORDER — CETIRIZINE HYDROCHLORIDE 10 MG/1
10 TABLET ORAL DAILY
Refills: 2 | Status: DISCONTINUED | OUTPATIENT
Start: 2021-07-24 | End: 2021-07-27 | Stop reason: HOSPADM

## 2021-07-23 RX ORDER — ONDANSETRON 4 MG/1
4 TABLET, FILM COATED ORAL EVERY 6 HOURS PRN
Status: DISCONTINUED | OUTPATIENT
Start: 2021-07-23 | End: 2021-07-27 | Stop reason: HOSPADM

## 2021-07-23 RX ORDER — SODIUM CHLORIDE 0.9 % (FLUSH) 0.9 %
10 SYRINGE (ML) INJECTION EVERY 12 HOURS SCHEDULED
Status: DISCONTINUED | OUTPATIENT
Start: 2021-07-23 | End: 2021-07-23 | Stop reason: HOSPADM

## 2021-07-23 RX ORDER — DEXAMETHASONE SODIUM PHOSPHATE 4 MG/ML
INJECTION, SOLUTION INTRA-ARTICULAR; INTRALESIONAL; INTRAMUSCULAR; INTRAVENOUS; SOFT TISSUE AS NEEDED
Status: DISCONTINUED | OUTPATIENT
Start: 2021-07-23 | End: 2021-07-23 | Stop reason: SURG

## 2021-07-23 RX ORDER — PROPOFOL 10 MG/ML
VIAL (ML) INTRAVENOUS AS NEEDED
Status: DISCONTINUED | OUTPATIENT
Start: 2021-07-23 | End: 2021-07-23 | Stop reason: SURG

## 2021-07-23 RX ORDER — NEOSTIGMINE METHYLSULFATE 1 MG/ML
INJECTION, SOLUTION INTRAVENOUS AS NEEDED
Status: DISCONTINUED | OUTPATIENT
Start: 2021-07-23 | End: 2021-07-23 | Stop reason: SURG

## 2021-07-23 RX ORDER — HEPARIN SODIUM 5000 [USP'U]/ML
5000 INJECTION, SOLUTION INTRAVENOUS; SUBCUTANEOUS EVERY 8 HOURS SCHEDULED
Status: DISPENSED | OUTPATIENT
Start: 2021-07-24 | End: 2021-07-25

## 2021-07-23 RX ORDER — ATORVASTATIN CALCIUM 20 MG/1
20 TABLET, FILM COATED ORAL NIGHTLY
Status: DISCONTINUED | OUTPATIENT
Start: 2021-07-23 | End: 2021-07-27 | Stop reason: HOSPADM

## 2021-07-23 RX ORDER — ROCURONIUM BROMIDE 10 MG/ML
INJECTION, SOLUTION INTRAVENOUS AS NEEDED
Status: DISCONTINUED | OUTPATIENT
Start: 2021-07-23 | End: 2021-07-23 | Stop reason: SURG

## 2021-07-23 RX ORDER — FENOFIBRATE 145 MG/1
145 TABLET, COATED ORAL DAILY
Refills: 2 | Status: DISCONTINUED | OUTPATIENT
Start: 2021-07-24 | End: 2021-07-27 | Stop reason: HOSPADM

## 2021-07-23 RX ORDER — ASPIRIN 325 MG
325 TABLET ORAL DAILY
Status: DISCONTINUED | OUTPATIENT
Start: 2021-07-24 | End: 2021-07-27 | Stop reason: HOSPADM

## 2021-07-23 RX ORDER — HYDROCODONE BITARTRATE AND ACETAMINOPHEN 7.5; 325 MG/1; MG/1
1 TABLET ORAL EVERY 4 HOURS PRN
Status: DISCONTINUED | OUTPATIENT
Start: 2021-07-23 | End: 2021-07-27 | Stop reason: HOSPADM

## 2021-07-23 RX ORDER — LIDOCAINE HYDROCHLORIDE 10 MG/ML
0.5 INJECTION, SOLUTION EPIDURAL; INFILTRATION; INTRACAUDAL; PERINEURAL ONCE AS NEEDED
Status: COMPLETED | OUTPATIENT
Start: 2021-07-23 | End: 2021-07-23

## 2021-07-23 RX ORDER — FLUTICASONE PROPIONATE 50 MCG
2 SPRAY, SUSPENSION (ML) NASAL 2 TIMES DAILY PRN
Status: DISCONTINUED | OUTPATIENT
Start: 2021-07-23 | End: 2021-07-27 | Stop reason: HOSPADM

## 2021-07-23 RX ORDER — SODIUM CHLORIDE, SODIUM LACTATE, POTASSIUM CHLORIDE, CALCIUM CHLORIDE 600; 310; 30; 20 MG/100ML; MG/100ML; MG/100ML; MG/100ML
9 INJECTION, SOLUTION INTRAVENOUS CONTINUOUS
Status: DISCONTINUED | OUTPATIENT
Start: 2021-07-23 | End: 2021-07-24

## 2021-07-23 RX ORDER — EPHEDRINE SULFATE 50 MG/ML
5 INJECTION, SOLUTION INTRAVENOUS ONCE AS NEEDED
Status: DISCONTINUED | OUTPATIENT
Start: 2021-07-23 | End: 2021-07-23 | Stop reason: HOSPADM

## 2021-07-23 RX ORDER — ISOSORBIDE MONONITRATE 60 MG/1
60 TABLET, EXTENDED RELEASE ORAL NIGHTLY
Status: DISCONTINUED | OUTPATIENT
Start: 2021-07-23 | End: 2021-07-27 | Stop reason: HOSPADM

## 2021-07-23 RX ORDER — ONDANSETRON 2 MG/ML
4 INJECTION INTRAMUSCULAR; INTRAVENOUS EVERY 6 HOURS PRN
Status: DISCONTINUED | OUTPATIENT
Start: 2021-07-23 | End: 2021-07-27 | Stop reason: HOSPADM

## 2021-07-23 RX ORDER — FENTANYL CITRATE 50 UG/ML
50 INJECTION, SOLUTION INTRAMUSCULAR; INTRAVENOUS
Status: DISCONTINUED | OUTPATIENT
Start: 2021-07-23 | End: 2021-07-23 | Stop reason: HOSPADM

## 2021-07-23 RX ORDER — PANTOPRAZOLE SODIUM 40 MG/1
40 TABLET, DELAYED RELEASE ORAL EVERY MORNING
Status: DISCONTINUED | OUTPATIENT
Start: 2021-07-24 | End: 2021-07-27 | Stop reason: HOSPADM

## 2021-07-23 RX ORDER — ACETAMINOPHEN 325 MG/1
650 TABLET ORAL EVERY 4 HOURS PRN
Status: DISCONTINUED | OUTPATIENT
Start: 2021-07-23 | End: 2021-07-27 | Stop reason: HOSPADM

## 2021-07-23 RX ORDER — MEPERIDINE HYDROCHLORIDE 25 MG/ML
12.5 INJECTION INTRAMUSCULAR; INTRAVENOUS; SUBCUTANEOUS
Status: DISCONTINUED | OUTPATIENT
Start: 2021-07-23 | End: 2021-07-23 | Stop reason: HOSPADM

## 2021-07-23 RX ADMIN — MORPHINE SULFATE 2 MG: 2 INJECTION, SOLUTION INTRAMUSCULAR; INTRAVENOUS at 22:04

## 2021-07-23 RX ADMIN — LIDOCAINE HYDROCHLORIDE 0.5 ML: 10 INJECTION, SOLUTION EPIDURAL; INFILTRATION; INTRACAUDAL; PERINEURAL at 07:40

## 2021-07-23 RX ADMIN — MUPIROCIN 1 APPLICATION: 20 OINTMENT TOPICAL at 08:05

## 2021-07-23 RX ADMIN — SODIUM CHLORIDE, POTASSIUM CHLORIDE, SODIUM LACTATE AND CALCIUM CHLORIDE 9 ML/HR: 600; 310; 30; 20 INJECTION, SOLUTION INTRAVENOUS at 07:40

## 2021-07-23 RX ADMIN — CARVEDILOL 6.25 MG: 6.25 TABLET, FILM COATED ORAL at 18:16

## 2021-07-23 RX ADMIN — LIDOCAINE HYDROCHLORIDE 50 MG: 10 INJECTION, SOLUTION EPIDURAL; INFILTRATION; INTRACAUDAL; PERINEURAL at 10:25

## 2021-07-23 RX ADMIN — HYDROCODONE BITARTRATE AND ACETAMINOPHEN 1 TABLET: 7.5; 325 TABLET ORAL at 18:57

## 2021-07-23 RX ADMIN — HYDROMORPHONE HYDROCHLORIDE 0.5 MG: 1 INJECTION, SOLUTION INTRAMUSCULAR; INTRAVENOUS; SUBCUTANEOUS at 12:38

## 2021-07-23 RX ADMIN — LABETALOL HYDROCHLORIDE 10 MG: 5 INJECTION, SOLUTION INTRAVENOUS at 11:07

## 2021-07-23 RX ADMIN — FENTANYL CITRATE 100 MCG: 50 INJECTION, SOLUTION INTRAMUSCULAR; INTRAVENOUS at 10:25

## 2021-07-23 RX ADMIN — ATORVASTATIN CALCIUM 20 MG: 20 TABLET, FILM COATED ORAL at 20:12

## 2021-07-23 RX ADMIN — FAMOTIDINE 20 MG: 20 TABLET ORAL at 08:05

## 2021-07-23 RX ADMIN — ONDANSETRON 4 MG: 2 INJECTION INTRAMUSCULAR; INTRAVENOUS at 11:45

## 2021-07-23 RX ADMIN — HYDROMORPHONE HYDROCHLORIDE 0.5 MG: 1 INJECTION, SOLUTION INTRAMUSCULAR; INTRAVENOUS; SUBCUTANEOUS at 16:35

## 2021-07-23 RX ADMIN — RANOLAZINE 500 MG: 500 TABLET, EXTENDED RELEASE ORAL at 20:12

## 2021-07-23 RX ADMIN — GLYCOPYRROLATE 0.4 MG: 0.4 INJECTION INTRAMUSCULAR; INTRAVENOUS at 12:06

## 2021-07-23 RX ADMIN — NEOSTIGMINE 3 MG: 1 INJECTION INTRAVENOUS at 12:06

## 2021-07-23 RX ADMIN — ROCURONIUM BROMIDE 50 MG: 10 INJECTION, SOLUTION INTRAVENOUS at 10:25

## 2021-07-23 RX ADMIN — VANCOMYCIN HYDROCHLORIDE 1500 MG: 100 INJECTION, POWDER, LYOPHILIZED, FOR SOLUTION INTRAVENOUS at 08:05

## 2021-07-23 RX ADMIN — DEXAMETHASONE SODIUM PHOSPHATE 8 MG: 4 INJECTION, SOLUTION INTRA-ARTICULAR; INTRALESIONAL; INTRAMUSCULAR; INTRAVENOUS; SOFT TISSUE at 10:25

## 2021-07-23 RX ADMIN — PROPOFOL 150 MG: 10 INJECTION, EMULSION INTRAVENOUS at 10:25

## 2021-07-23 RX ADMIN — ISOSORBIDE MONONITRATE 60 MG: 60 TABLET, EXTENDED RELEASE ORAL at 20:12

## 2021-07-23 RX ADMIN — HYDROMORPHONE HYDROCHLORIDE 0.5 MG: 1 INJECTION, SOLUTION INTRAMUSCULAR; INTRAVENOUS; SUBCUTANEOUS at 15:30

## 2021-07-23 NOTE — ANESTHESIA POSTPROCEDURE EVALUATION
Patient: Sawyer Tilley    Procedure Summary     Date: 07/23/21 Room / Location:  [facility]    Anesthesia Start: 1019 Anesthesia Stop:     Procedures:       BRONCHOSCOPY (N/A Bronchus)      THORACOSCOPY VIDEO ASSISTED WITH WEDGE RESECTION (N/A Chest) Diagnosis:       Lung nodule      (Lung nodule [R91.1])    Surgeons: Rudolph Cm MD Provider: Lakhwinder Head MD    Anesthesia Type: general ASA Status: 3          Anesthesia Type: general    Vitals  No vitals data found for the desired time range.          Post Anesthesia Care and Evaluation    Patient location during evaluation: PACU  Patient participation: complete - patient participated  Level of consciousness: awake and alert  Pain score: 2  Pain management: adequate  Airway patency: patent  Anesthetic complications: No anesthetic complications  PONV Status: none  Cardiovascular status: hemodynamically stable and acceptable  Respiratory status: nonlabored ventilation, acceptable and nasal cannula  Hydration status: acceptable

## 2021-07-23 NOTE — ANESTHESIA PROCEDURE NOTES
Airway  Urgency: elective    Date/Time: 7/23/2021 10:26 AM  Airway not difficult    General Information and Staff    Patient location during procedure: OR  CRNA: Jed Alberto CRNA    Indications and Patient Condition  Indications for airway management: airway protection    Preoxygenated: yes  MILS not maintained throughout  Mask difficulty assessment: 1 - vent by mask    Final Airway Details  Final airway type: endotracheal airway      Successful airway: EBT - double lumen left  Cuffed: yes   Successful intubation technique: direct laryngoscopy  Endotracheal tube insertion site: oral  Blade: Luis M  Blade size: 3  EBT DL size (fr): 39  Cormack-Lehane Classification: grade I - full view of glottis  Placement verified by: chest auscultation and capnometry   Measured from: lips  Number of attempts at approach: 1  Assessment: lips, teeth, and gum same as pre-op and atraumatic intubation    Additional Comments  Negative epigastric sounds, Breath sound equal bilaterally with symmetric chest rise and fall

## 2021-07-23 NOTE — ANESTHESIA PREPROCEDURE EVALUATION
Anesthesia Evaluation     Patient summary reviewed and Nursing notes reviewed   no history of anesthetic complications:  NPO Solid Status: > 8 hours  NPO Liquid Status: > 2 hours           Airway   Mallampati: II  TM distance: >3 FB  Neck ROM: full  No difficulty expected  Dental - normal exam     Pulmonary - normal exam    breath sounds clear to auscultation  (+) a smoker (1ppd) Current, lung cancer, sleep apnea (Stopped using CPAP since [facility] recall),   Cardiovascular - normal exam    ECG reviewed  Rhythm: regular  Rate: normal    (+) hypertension, CAD, CABG,     ROS comment: 2019 Echo:  · Left ventricular wall thickness is consistent with mild concentric hypertrophy.  · Estimated EF appears to be in the range of 56 - 60%.  · Left ventricular diastolic dysfunction (grade I) consistent with impaired relaxation.  · Left atrial cavity size is mild-to-moderately dilated. 4.5 Cm  · No aortic valve stenosis is present.  · Mild-to-moderate mitral valve regurgitation is present  · Physiologic tricuspid valve regurgitation is present.       Neuro/Psych  GI/Hepatic/Renal/Endo    (+) obesity,  GERD well controlled,      Musculoskeletal     (+) back pain,   Abdominal    Substance History      OB/GYN          Other   arthritis,                      Anesthesia Plan    ASA 3     general     intravenous induction     Anesthetic plan, all risks, benefits, and alternatives have been provided, discussed and informed consent has been obtained with: patient.    Plan discussed with CRNA.

## 2021-07-24 ENCOUNTER — APPOINTMENT (OUTPATIENT)
Dept: GENERAL RADIOLOGY | Facility: HOSPITAL | Age: 61
End: 2021-07-24

## 2021-07-24 LAB
ANION GAP SERPL CALCULATED.3IONS-SCNC: 11 MMOL/L (ref 5–15)
BASOPHILS # BLD AUTO: 0.01 10*3/MM3 (ref 0–0.2)
BASOPHILS NFR BLD AUTO: 0.1 % (ref 0–1.5)
BH BB BLOOD EXPIRATION DATE: NORMAL
BH BB BLOOD EXPIRATION DATE: NORMAL
BH BB BLOOD TYPE BARCODE: 6200
BH BB BLOOD TYPE BARCODE: 6200
BH BB DISPENSE STATUS: NORMAL
BH BB DISPENSE STATUS: NORMAL
BH BB PRODUCT CODE: NORMAL
BH BB PRODUCT CODE: NORMAL
BH BB UNIT NUMBER: NORMAL
BH BB UNIT NUMBER: NORMAL
BUN SERPL-MCNC: 11 MG/DL (ref 8–23)
BUN/CREAT SERPL: 10.6 (ref 7–25)
CALCIUM SPEC-SCNC: 8.9 MG/DL (ref 8.6–10.5)
CHLORIDE SERPL-SCNC: 102 MMOL/L (ref 98–107)
CO2 SERPL-SCNC: 25 MMOL/L (ref 22–29)
CREAT SERPL-MCNC: 1.04 MG/DL (ref 0.76–1.27)
CROSSMATCH INTERPRETATION: NORMAL
CROSSMATCH INTERPRETATION: NORMAL
CRYOGLOB SER QL: ABNORMAL
CRYPTOC AG CSF QL: POSITIVE
DEPRECATED RDW RBC AUTO: 43.1 FL (ref 37–54)
EOSINOPHIL # BLD AUTO: 0 10*3/MM3 (ref 0–0.4)
EOSINOPHIL NFR BLD AUTO: 0 % (ref 0.3–6.2)
ERYTHROCYTE [DISTWIDTH] IN BLOOD BY AUTOMATED COUNT: 12.5 % (ref 12.3–15.4)
GFR SERPL CREATININE-BSD FRML MDRD: 72 ML/MIN/1.73
GLUCOSE SERPL-MCNC: 128 MG/DL (ref 65–99)
HCT VFR BLD AUTO: 42 % (ref 37.5–51)
HGB BLD-MCNC: 13.9 G/DL (ref 13–17.7)
IMM GRANULOCYTES # BLD AUTO: 0.06 10*3/MM3 (ref 0–0.05)
IMM GRANULOCYTES NFR BLD AUTO: 0.5 % (ref 0–0.5)
LYMPHOCYTES # BLD AUTO: 1.25 10*3/MM3 (ref 0.7–3.1)
LYMPHOCYTES NFR BLD AUTO: 10.8 % (ref 19.6–45.3)
MCH RBC QN AUTO: 30.8 PG (ref 26.6–33)
MCHC RBC AUTO-ENTMCNC: 33.1 G/DL (ref 31.5–35.7)
MCV RBC AUTO: 92.9 FL (ref 79–97)
MONOCYTES # BLD AUTO: 0.82 10*3/MM3 (ref 0.1–0.9)
MONOCYTES NFR BLD AUTO: 7.1 % (ref 5–12)
NEUTROPHILS NFR BLD AUTO: 81.5 % (ref 42.7–76)
NEUTROPHILS NFR BLD AUTO: 9.43 10*3/MM3 (ref 1.7–7)
NRBC BLD AUTO-RTO: 0 /100 WBC (ref 0–0.2)
PLATELET # BLD AUTO: 159 10*3/MM3 (ref 140–450)
PMV BLD AUTO: 12 FL (ref 6–12)
POTASSIUM SERPL-SCNC: 4.4 MMOL/L (ref 3.5–5.2)
RBC # BLD AUTO: 4.52 10*6/MM3 (ref 4.14–5.8)
SODIUM SERPL-SCNC: 138 MMOL/L (ref 136–145)
UNIT  ABO: NORMAL
UNIT  ABO: NORMAL
UNIT  RH: NORMAL
UNIT  RH: NORMAL
WBC # BLD AUTO: 11.57 10*3/MM3 (ref 3.4–10.8)

## 2021-07-24 PROCEDURE — 87899 AGENT NOS ASSAY W/OPTIC: CPT | Performed by: NURSE PRACTITIONER

## 2021-07-24 PROCEDURE — 99232 SBSQ HOSP IP/OBS MODERATE 35: CPT | Performed by: INTERNAL MEDICINE

## 2021-07-24 PROCEDURE — 86480 TB TEST CELL IMMUN MEASURE: CPT | Performed by: NURSE PRACTITIONER

## 2021-07-24 PROCEDURE — 25010000002 VANCOMYCIN 10 G RECONSTITUTED SOLUTION: Performed by: PHYSICIAN ASSISTANT

## 2021-07-24 PROCEDURE — 87385 HISTOPLASMA CAPSUL AG IA: CPT | Performed by: NURSE PRACTITIONER

## 2021-07-24 PROCEDURE — 71045 X-RAY EXAM CHEST 1 VIEW: CPT

## 2021-07-24 PROCEDURE — 85025 COMPLETE CBC W/AUTO DIFF WBC: CPT | Performed by: PHYSICIAN ASSISTANT

## 2021-07-24 PROCEDURE — 80048 BASIC METABOLIC PNL TOTAL CA: CPT | Performed by: PHYSICIAN ASSISTANT

## 2021-07-24 PROCEDURE — 25010000002 HEPARIN (PORCINE) PER 1000 UNITS: Performed by: PHYSICIAN ASSISTANT

## 2021-07-24 PROCEDURE — 87449 NOS EACH ORGANISM AG IA: CPT | Performed by: NURSE PRACTITIONER

## 2021-07-24 PROCEDURE — 25010000002 MORPHINE PER 10 MG: Performed by: PHYSICIAN ASSISTANT

## 2021-07-24 PROCEDURE — 86606 ASPERGILLUS ANTIBODY: CPT | Performed by: NURSE PRACTITIONER

## 2021-07-24 RX ADMIN — RANOLAZINE 500 MG: 500 TABLET, EXTENDED RELEASE ORAL at 08:10

## 2021-07-24 RX ADMIN — CARVEDILOL 6.25 MG: 6.25 TABLET, FILM COATED ORAL at 08:10

## 2021-07-24 RX ADMIN — MORPHINE SULFATE 2 MG: 2 INJECTION, SOLUTION INTRAMUSCULAR; INTRAVENOUS at 15:39

## 2021-07-24 RX ADMIN — HYDROCODONE BITARTRATE AND ACETAMINOPHEN 1 TABLET: 7.5; 325 TABLET ORAL at 04:40

## 2021-07-24 RX ADMIN — MORPHINE SULFATE 2 MG: 2 INJECTION, SOLUTION INTRAMUSCULAR; INTRAVENOUS at 05:03

## 2021-07-24 RX ADMIN — CETIRIZINE HYDROCHLORIDE 10 MG: 10 TABLET, FILM COATED ORAL at 08:10

## 2021-07-24 RX ADMIN — VANCOMYCIN HYDROCHLORIDE 1500 MG: 100 INJECTION, POWDER, LYOPHILIZED, FOR SOLUTION INTRAVENOUS at 02:02

## 2021-07-24 RX ADMIN — ASPIRIN 325 MG ORAL TABLET 325 MG: 325 PILL ORAL at 08:10

## 2021-07-24 RX ADMIN — HEPARIN SODIUM 5000 UNITS: 5000 INJECTION INTRAVENOUS; SUBCUTANEOUS at 22:05

## 2021-07-24 RX ADMIN — ISOSORBIDE MONONITRATE 60 MG: 60 TABLET, EXTENDED RELEASE ORAL at 20:07

## 2021-07-24 RX ADMIN — ATORVASTATIN CALCIUM 20 MG: 20 TABLET, FILM COATED ORAL at 20:07

## 2021-07-24 RX ADMIN — HYDROCODONE BITARTRATE AND ACETAMINOPHEN 1 TABLET: 7.5; 325 TABLET ORAL at 23:52

## 2021-07-24 RX ADMIN — HEPARIN SODIUM 5000 UNITS: 5000 INJECTION INTRAVENOUS; SUBCUTANEOUS at 13:47

## 2021-07-24 RX ADMIN — HEPARIN SODIUM 5000 UNITS: 5000 INJECTION INTRAVENOUS; SUBCUTANEOUS at 06:14

## 2021-07-24 RX ADMIN — HYDROCODONE BITARTRATE AND ACETAMINOPHEN 1 TABLET: 7.5; 325 TABLET ORAL at 00:12

## 2021-07-24 RX ADMIN — FENOFIBRATE 145 MG: 145 TABLET ORAL at 08:11

## 2021-07-24 RX ADMIN — MORPHINE SULFATE 2 MG: 2 INJECTION, SOLUTION INTRAMUSCULAR; INTRAVENOUS at 23:54

## 2021-07-24 RX ADMIN — HYDROCODONE BITARTRATE AND ACETAMINOPHEN 1 TABLET: 7.5; 325 TABLET ORAL at 20:07

## 2021-07-24 RX ADMIN — CARVEDILOL 6.25 MG: 6.25 TABLET, FILM COATED ORAL at 17:21

## 2021-07-24 RX ADMIN — MORPHINE SULFATE 2 MG: 2 INJECTION, SOLUTION INTRAMUSCULAR; INTRAVENOUS at 21:11

## 2021-07-24 RX ADMIN — RANOLAZINE 500 MG: 500 TABLET, EXTENDED RELEASE ORAL at 20:07

## 2021-07-24 RX ADMIN — ACETAMINOPHEN 650 MG: 325 TABLET ORAL at 13:47

## 2021-07-24 RX ADMIN — PANTOPRAZOLE SODIUM 40 MG: 40 TABLET, DELAYED RELEASE ORAL at 06:14

## 2021-07-25 ENCOUNTER — APPOINTMENT (OUTPATIENT)
Dept: MRI IMAGING | Facility: HOSPITAL | Age: 61
End: 2021-07-25

## 2021-07-25 ENCOUNTER — APPOINTMENT (OUTPATIENT)
Dept: GENERAL RADIOLOGY | Facility: HOSPITAL | Age: 61
End: 2021-07-25

## 2021-07-25 PROBLEM — B45.9 CRYPTOCOCCUS (HCC): Status: ACTIVE | Noted: 2021-07-25

## 2021-07-25 PROCEDURE — A9577 INJ MULTIHANCE: HCPCS | Performed by: THORACIC SURGERY (CARDIOTHORACIC VASCULAR SURGERY)

## 2021-07-25 PROCEDURE — 25010000002 HEPARIN (PORCINE) PER 1000 UNITS: Performed by: INTERNAL MEDICINE

## 2021-07-25 PROCEDURE — 86698 HISTOPLASMA ANTIBODY: CPT | Performed by: INTERNAL MEDICINE

## 2021-07-25 PROCEDURE — 71045 X-RAY EXAM CHEST 1 VIEW: CPT

## 2021-07-25 PROCEDURE — 87449 NOS EACH ORGANISM AG IA: CPT | Performed by: NURSE PRACTITIONER

## 2021-07-25 PROCEDURE — 87385 HISTOPLASMA CAPSUL AG IA: CPT | Performed by: INTERNAL MEDICINE

## 2021-07-25 PROCEDURE — 25010000002 HEPARIN (PORCINE) PER 1000 UNITS: Performed by: PHYSICIAN ASSISTANT

## 2021-07-25 PROCEDURE — 70553 MRI BRAIN STEM W/O & W/DYE: CPT

## 2021-07-25 PROCEDURE — 86612 BLASTOMYCES ANTIBODY: CPT | Performed by: INTERNAL MEDICINE

## 2021-07-25 PROCEDURE — 0 GADOBENATE DIMEGLUMINE 529 MG/ML SOLUTION: Performed by: THORACIC SURGERY (CARDIOTHORACIC VASCULAR SURGERY)

## 2021-07-25 PROCEDURE — 25010000002 MORPHINE PER 10 MG: Performed by: PHYSICIAN ASSISTANT

## 2021-07-25 PROCEDURE — 87305 ASPERGILLUS AG IA: CPT | Performed by: INTERNAL MEDICINE

## 2021-07-25 PROCEDURE — 99232 SBSQ HOSP IP/OBS MODERATE 35: CPT | Performed by: INTERNAL MEDICINE

## 2021-07-25 RX ORDER — FLUCONAZOLE 200 MG/1
400 TABLET ORAL EVERY 24 HOURS
Status: DISCONTINUED | OUTPATIENT
Start: 2021-07-25 | End: 2021-07-27 | Stop reason: HOSPADM

## 2021-07-25 RX ADMIN — GADOBENATE DIMEGLUMINE 20 ML: 529 INJECTION, SOLUTION INTRAVENOUS at 21:26

## 2021-07-25 RX ADMIN — MORPHINE SULFATE 2 MG: 2 INJECTION, SOLUTION INTRAMUSCULAR; INTRAVENOUS at 22:02

## 2021-07-25 RX ADMIN — FENOFIBRATE 145 MG: 145 TABLET ORAL at 08:12

## 2021-07-25 RX ADMIN — HYDROCODONE BITARTRATE AND ACETAMINOPHEN 1 TABLET: 7.5; 325 TABLET ORAL at 04:51

## 2021-07-25 RX ADMIN — CETIRIZINE HYDROCHLORIDE 10 MG: 10 TABLET, FILM COATED ORAL at 08:12

## 2021-07-25 RX ADMIN — HYDROCODONE BITARTRATE AND ACETAMINOPHEN 1 TABLET: 7.5; 325 TABLET ORAL at 20:01

## 2021-07-25 RX ADMIN — ATORVASTATIN CALCIUM 20 MG: 20 TABLET, FILM COATED ORAL at 20:02

## 2021-07-25 RX ADMIN — ISOSORBIDE MONONITRATE 60 MG: 60 TABLET, EXTENDED RELEASE ORAL at 20:01

## 2021-07-25 RX ADMIN — MORPHINE SULFATE 2 MG: 2 INJECTION, SOLUTION INTRAMUSCULAR; INTRAVENOUS at 14:04

## 2021-07-25 RX ADMIN — CARVEDILOL 6.25 MG: 6.25 TABLET, FILM COATED ORAL at 08:12

## 2021-07-25 RX ADMIN — MORPHINE SULFATE 2 MG: 2 INJECTION, SOLUTION INTRAMUSCULAR; INTRAVENOUS at 07:41

## 2021-07-25 RX ADMIN — HYDROCODONE BITARTRATE AND ACETAMINOPHEN 1 TABLET: 7.5; 325 TABLET ORAL at 11:49

## 2021-07-25 RX ADMIN — RANOLAZINE 500 MG: 500 TABLET, EXTENDED RELEASE ORAL at 20:01

## 2021-07-25 RX ADMIN — PANTOPRAZOLE SODIUM 40 MG: 40 TABLET, DELAYED RELEASE ORAL at 05:47

## 2021-07-25 RX ADMIN — CARVEDILOL 6.25 MG: 6.25 TABLET, FILM COATED ORAL at 18:01

## 2021-07-25 RX ADMIN — HEPARIN SODIUM 5000 UNITS: 5000 INJECTION INTRAVENOUS; SUBCUTANEOUS at 13:59

## 2021-07-25 RX ADMIN — FLUCONAZOLE 400 MG: 200 TABLET ORAL at 11:49

## 2021-07-25 RX ADMIN — HYDROCODONE BITARTRATE AND ACETAMINOPHEN 1 TABLET: 7.5; 325 TABLET ORAL at 16:39

## 2021-07-25 RX ADMIN — HEPARIN SODIUM 5000 UNITS: 5000 INJECTION INTRAVENOUS; SUBCUTANEOUS at 05:47

## 2021-07-25 RX ADMIN — ASPIRIN 325 MG ORAL TABLET 325 MG: 325 PILL ORAL at 08:12

## 2021-07-25 RX ADMIN — RANOLAZINE 500 MG: 500 TABLET, EXTENDED RELEASE ORAL at 08:12

## 2021-07-26 ENCOUNTER — APPOINTMENT (OUTPATIENT)
Dept: GENERAL RADIOLOGY | Facility: HOSPITAL | Age: 61
End: 2021-07-26

## 2021-07-26 LAB
APPEARANCE CSF: CLEAR
APPEARANCE CSF: CLEAR
BACTERIA SPEC AEROBE CULT: NORMAL
C GATTII+NEOFOR DNA CSF QL NAA+NON-PROBE: NOT DETECTED
CMV DNA CSF QL NAA+PROBE: NOT DETECTED
COLOR CSF: COLORLESS
COLOR CSF: COLORLESS
COLOR SPUN CSF: COLORLESS
COLOR SPUN CSF: COLORLESS
CRYPTOC AG CSF QL LA: NEGATIVE
E COLI K1 DNA CSF QL NAA+NON-PROBE: NOT DETECTED
EV RNA CSF QL NAA+PROBE: NOT DETECTED
GLUCOSE CSF-MCNC: 67 MG/DL (ref 40–70)
GP B STREP DNA SPEC QL NAA+PROBE: NOT DETECTED
GRAM STN SPEC: NORMAL
GRAM STN SPEC: NORMAL
HAEM INFLU SEROTYP DNA SPEC NAA+PROBE: NOT DETECTED
HHV6 DNA CSF QL NAA+PROBE: NOT DETECTED
HIV1+2 AB SER QL: NORMAL
HSV1 DNA CSF QL NAA+PROBE: NOT DETECTED
HSV2 DNA CSF QL NAA+PROBE: NOT DETECTED
L MONOCYTOG RRNA SPEC QL PROBE: NOT DETECTED
N MEN DNA SPEC QL NAA+PROBE: NOT DETECTED
PARECHOVIRUS A RNA CSF QL NAA+NON-PROBE: NOT DETECTED
PROT CSF-MCNC: 34.5 MG/DL (ref 15–45)
RBC # CSF MANUAL: 0 /MM3 (ref 0–5)
RBC # CSF MANUAL: 1 /MM3 (ref 0–5)
S PNEUM DNA CSF QL NAA+NON-PROBE: NOT DETECTED
SPECIMEN VOL CSF: 8 ML
SPECIMEN VOL CSF: 8 ML
TUBE # CSF: 1
TUBE # CSF: 4
VZV DNA CSF QL NAA+PROBE: NOT DETECTED
WBC # CSF MANUAL: 0 /MM3 (ref 0–5)
WBC # CSF MANUAL: 1 /MM3 (ref 0–5)

## 2021-07-26 PROCEDURE — 87205 SMEAR GRAM STAIN: CPT | Performed by: INTERNAL MEDICINE

## 2021-07-26 PROCEDURE — G0432 EIA HIV-1/HIV-2 SCREEN: HCPCS | Performed by: INTERNAL MEDICINE

## 2021-07-26 PROCEDURE — 86361 T CELL ABSOLUTE COUNT: CPT | Performed by: INTERNAL MEDICINE

## 2021-07-26 PROCEDURE — 87206 SMEAR FLUORESCENT/ACID STAI: CPT | Performed by: INTERNAL MEDICINE

## 2021-07-26 PROCEDURE — 71045 X-RAY EXAM CHEST 1 VIEW: CPT

## 2021-07-26 PROCEDURE — 84157 ASSAY OF PROTEIN OTHER: CPT | Performed by: INTERNAL MEDICINE

## 2021-07-26 PROCEDURE — 86778 TOXOPLASMA ANTIBODY IGM: CPT | Performed by: INTERNAL MEDICINE

## 2021-07-26 PROCEDURE — 82945 GLUCOSE OTHER FLUID: CPT | Performed by: INTERNAL MEDICINE

## 2021-07-26 PROCEDURE — 87327 CRYPTOCOCCUS NEOFORM AG IA: CPT | Performed by: INTERNAL MEDICINE

## 2021-07-26 PROCEDURE — 87116 MYCOBACTERIA CULTURE: CPT | Performed by: INTERNAL MEDICINE

## 2021-07-26 PROCEDURE — 86777 TOXOPLASMA ANTIBODY: CPT | Performed by: INTERNAL MEDICINE

## 2021-07-26 PROCEDURE — 87015 SPECIMEN INFECT AGNT CONCNTJ: CPT | Performed by: INTERNAL MEDICINE

## 2021-07-26 PROCEDURE — 99231 SBSQ HOSP IP/OBS SF/LOW 25: CPT | Performed by: INTERNAL MEDICINE

## 2021-07-26 PROCEDURE — 99024 POSTOP FOLLOW-UP VISIT: CPT | Performed by: PHYSICIAN ASSISTANT

## 2021-07-26 PROCEDURE — 87102 FUNGUS ISOLATION CULTURE: CPT | Performed by: INTERNAL MEDICINE

## 2021-07-26 PROCEDURE — 87483 CNS DNA AMP PROBE TYPE 12-25: CPT | Performed by: INTERNAL MEDICINE

## 2021-07-26 PROCEDURE — 89050 BODY FLUID CELL COUNT: CPT | Performed by: INTERNAL MEDICINE

## 2021-07-26 PROCEDURE — 009U3ZX DRAINAGE OF SPINAL CANAL, PERCUTANEOUS APPROACH, DIAGNOSTIC: ICD-10-PCS | Performed by: RADIOLOGY

## 2021-07-26 PROCEDURE — 87070 CULTURE OTHR SPECIMN AEROBIC: CPT | Performed by: INTERNAL MEDICINE

## 2021-07-26 RX ORDER — LIDOCAINE HYDROCHLORIDE 10 MG/ML
5 INJECTION, SOLUTION EPIDURAL; INFILTRATION; INTRACAUDAL; PERINEURAL ONCE
Status: COMPLETED | OUTPATIENT
Start: 2021-07-26 | End: 2021-07-26

## 2021-07-26 RX ORDER — HEPARIN SODIUM 5000 [USP'U]/ML
5000 INJECTION, SOLUTION INTRAVENOUS; SUBCUTANEOUS EVERY 8 HOURS SCHEDULED
Status: DISCONTINUED | OUTPATIENT
Start: 2021-07-27 | End: 2021-07-27 | Stop reason: HOSPADM

## 2021-07-26 RX ADMIN — CARVEDILOL 6.25 MG: 6.25 TABLET, FILM COATED ORAL at 08:19

## 2021-07-26 RX ADMIN — ASPIRIN 325 MG ORAL TABLET 325 MG: 325 PILL ORAL at 08:19

## 2021-07-26 RX ADMIN — FENOFIBRATE 145 MG: 145 TABLET ORAL at 08:19

## 2021-07-26 RX ADMIN — HYDROCODONE BITARTRATE AND ACETAMINOPHEN 1 TABLET: 7.5; 325 TABLET ORAL at 08:19

## 2021-07-26 RX ADMIN — PANTOPRAZOLE SODIUM 40 MG: 40 TABLET, DELAYED RELEASE ORAL at 06:03

## 2021-07-26 RX ADMIN — HYDROCODONE BITARTRATE AND ACETAMINOPHEN 1 TABLET: 7.5; 325 TABLET ORAL at 03:05

## 2021-07-26 RX ADMIN — RANOLAZINE 500 MG: 500 TABLET, EXTENDED RELEASE ORAL at 20:10

## 2021-07-26 RX ADMIN — ISOSORBIDE MONONITRATE 60 MG: 60 TABLET, EXTENDED RELEASE ORAL at 20:10

## 2021-07-26 RX ADMIN — CETIRIZINE HYDROCHLORIDE 10 MG: 10 TABLET, FILM COATED ORAL at 08:19

## 2021-07-26 RX ADMIN — HYDROCODONE BITARTRATE AND ACETAMINOPHEN 1 TABLET: 7.5; 325 TABLET ORAL at 12:25

## 2021-07-26 RX ADMIN — ATORVASTATIN CALCIUM 20 MG: 20 TABLET, FILM COATED ORAL at 20:10

## 2021-07-26 RX ADMIN — CARVEDILOL 6.25 MG: 6.25 TABLET, FILM COATED ORAL at 17:33

## 2021-07-26 RX ADMIN — LIDOCAINE HYDROCHLORIDE 5 ML: 10 INJECTION, SOLUTION EPIDURAL; INFILTRATION; INTRACAUDAL; PERINEURAL at 09:39

## 2021-07-26 RX ADMIN — RANOLAZINE 500 MG: 500 TABLET, EXTENDED RELEASE ORAL at 08:19

## 2021-07-26 RX ADMIN — FLUCONAZOLE 400 MG: 200 TABLET ORAL at 12:25

## 2021-07-26 RX ADMIN — HYDROCODONE BITARTRATE AND ACETAMINOPHEN 1 TABLET: 7.5; 325 TABLET ORAL at 22:08

## 2021-07-27 ENCOUNTER — APPOINTMENT (OUTPATIENT)
Dept: GENERAL RADIOLOGY | Facility: HOSPITAL | Age: 61
End: 2021-07-27

## 2021-07-27 VITALS
DIASTOLIC BLOOD PRESSURE: 63 MMHG | HEIGHT: 66 IN | WEIGHT: 214.07 LBS | TEMPERATURE: 98.4 F | RESPIRATION RATE: 16 BRPM | HEART RATE: 68 BPM | BODY MASS INDEX: 34.4 KG/M2 | SYSTOLIC BLOOD PRESSURE: 113 MMHG | OXYGEN SATURATION: 93 %

## 2021-07-27 LAB
ANION GAP SERPL CALCULATED.3IONS-SCNC: 11 MMOL/L (ref 5–15)
BASOPHILS # BLD AUTO: 0.03 10*3/MM3 (ref 0–0.2)
BASOPHILS # BLD AUTO: 0.1 X10E3/UL (ref 0–0.2)
BASOPHILS NFR BLD AUTO: 0.3 % (ref 0–1.5)
BASOPHILS NFR BLD AUTO: 1 %
BUN SERPL-MCNC: 15 MG/DL (ref 8–23)
BUN/CREAT SERPL: 15.5 (ref 7–25)
CALCIUM SPEC-SCNC: 9.8 MG/DL (ref 8.6–10.5)
CD3+CD4+ CELLS # BLD: 663 /UL (ref 359–1519)
CD3+CD4+ CELLS NFR BLD: 51 % (ref 30.8–58.5)
CHLORIDE SERPL-SCNC: 95 MMOL/L (ref 98–107)
CO2 SERPL-SCNC: 27 MMOL/L (ref 22–29)
CREAT SERPL-MCNC: 0.97 MG/DL (ref 0.76–1.27)
CYTO UR: NORMAL
DEPRECATED RDW RBC AUTO: 41.5 FL (ref 37–54)
EOSINOPHIL # BLD AUTO: 0.1 X10E3/UL (ref 0–0.4)
EOSINOPHIL # BLD AUTO: 0.23 10*3/MM3 (ref 0–0.4)
EOSINOPHIL NFR BLD AUTO: 1 %
EOSINOPHIL NFR BLD AUTO: 2.6 % (ref 0.3–6.2)
ERYTHROCYTE [DISTWIDTH] IN BLOOD BY AUTOMATED COUNT: 12.3 % (ref 12.3–15.4)
ERYTHROCYTE [DISTWIDTH] IN BLOOD BY AUTOMATED COUNT: 12.5 % (ref 11.6–15.4)
GFR SERPL CREATININE-BSD FRML MDRD: 78 ML/MIN/1.73
GLUCOSE SERPL-MCNC: 117 MG/DL (ref 65–99)
H CAPSUL AG UR QL IA: <0.5
HCT VFR BLD AUTO: 41.2 % (ref 37.5–51)
HCT VFR BLD AUTO: 41.6 % (ref 37.5–51)
HGB BLD-MCNC: 13.6 G/DL (ref 13–17.7)
HGB BLD-MCNC: 14.3 G/DL (ref 13–17.7)
IMM GRANULOCYTES # BLD AUTO: 0.07 10*3/MM3 (ref 0–0.05)
IMM GRANULOCYTES # BLD AUTO: 0.1 X10E3/UL (ref 0–0.1)
IMM GRANULOCYTES NFR BLD AUTO: 0.8 % (ref 0–0.5)
IMM GRANULOCYTES NFR BLD AUTO: 1 %
LAB AP CASE REPORT: NORMAL
LAB AP CLINICAL INFORMATION: NORMAL
LAB AP DIAGNOSIS COMMENT: NORMAL
LAB AP SPECIAL STAINS: NORMAL
LYMPHOCYTES # BLD AUTO: 1.3 X10E3/UL (ref 0.7–3.1)
LYMPHOCYTES # BLD AUTO: 1.51 10*3/MM3 (ref 0.7–3.1)
LYMPHOCYTES NFR BLD AUTO: 14 %
LYMPHOCYTES NFR BLD AUTO: 16.9 % (ref 19.6–45.3)
Lab: NORMAL
Lab: NORMAL
MCH RBC QN AUTO: 30.3 PG (ref 26.6–33)
MCH RBC QN AUTO: 31.8 PG (ref 26.6–33)
MCHC RBC AUTO-ENTMCNC: 33 G/DL (ref 31.5–35.7)
MCHC RBC AUTO-ENTMCNC: 34.4 G/DL (ref 31.5–35.7)
MCV RBC AUTO: 91.8 FL (ref 79–97)
MCV RBC AUTO: 93 FL (ref 79–97)
MONOCYTES # BLD AUTO: 0.8 X10E3/UL (ref 0.1–0.9)
MONOCYTES # BLD AUTO: 0.88 10*3/MM3 (ref 0.1–0.9)
MONOCYTES NFR BLD AUTO: 8 %
MONOCYTES NFR BLD AUTO: 9.9 % (ref 5–12)
NEUTROPHILS # BLD AUTO: 7.5 X10E3/UL (ref 1.4–7)
NEUTROPHILS NFR BLD AUTO: 6.19 10*3/MM3 (ref 1.7–7)
NEUTROPHILS NFR BLD AUTO: 69.5 % (ref 42.7–76)
NEUTROPHILS NFR BLD AUTO: 75 %
NRBC BLD AUTO-RTO: 0 /100 WBC (ref 0–0.2)
PATH REPORT.FINAL DX SPEC: NORMAL
PATH REPORT.GROSS SPEC: NORMAL
PLATELET # BLD AUTO: 155 X10E3/UL (ref 150–450)
PLATELET # BLD AUTO: 188 10*3/MM3 (ref 140–450)
PMV BLD AUTO: 11.7 FL (ref 6–12)
POTASSIUM SERPL-SCNC: 4.6 MMOL/L (ref 3.5–5.2)
RBC # BLD AUTO: 4.49 10*6/MM3 (ref 4.14–5.8)
RBC # BLD AUTO: 4.49 X10E6/UL (ref 4.14–5.8)
SODIUM SERPL-SCNC: 133 MMOL/L (ref 136–145)
WBC # BLD AUTO: 8.91 10*3/MM3 (ref 3.4–10.8)
WBC # BLD AUTO: 9.8 X10E3/UL (ref 3.4–10.8)

## 2021-07-27 PROCEDURE — 25010000002 HEPARIN (PORCINE) PER 1000 UNITS: Performed by: INTERNAL MEDICINE

## 2021-07-27 PROCEDURE — 99231 SBSQ HOSP IP/OBS SF/LOW 25: CPT | Performed by: INTERNAL MEDICINE

## 2021-07-27 PROCEDURE — 71045 X-RAY EXAM CHEST 1 VIEW: CPT

## 2021-07-27 PROCEDURE — 99024 POSTOP FOLLOW-UP VISIT: CPT | Performed by: THORACIC SURGERY (CARDIOTHORACIC VASCULAR SURGERY)

## 2021-07-27 PROCEDURE — 85025 COMPLETE CBC W/AUTO DIFF WBC: CPT | Performed by: INTERNAL MEDICINE

## 2021-07-27 PROCEDURE — 80048 BASIC METABOLIC PNL TOTAL CA: CPT | Performed by: INTERNAL MEDICINE

## 2021-07-27 RX ORDER — FLUCONAZOLE 200 MG/1
400 TABLET ORAL EVERY 24 HOURS
Qty: 54 TABLET | Refills: 0 | Status: SHIPPED | OUTPATIENT
Start: 2021-07-27 | End: 2021-08-23

## 2021-07-27 RX ADMIN — MAGNESIUM HYDROXIDE 10 ML: 2400 SUSPENSION ORAL at 06:07

## 2021-07-27 RX ADMIN — ASPIRIN 325 MG ORAL TABLET 325 MG: 325 PILL ORAL at 09:26

## 2021-07-27 RX ADMIN — RANOLAZINE 500 MG: 500 TABLET, EXTENDED RELEASE ORAL at 09:26

## 2021-07-27 RX ADMIN — PANTOPRAZOLE SODIUM 40 MG: 40 TABLET, DELAYED RELEASE ORAL at 06:07

## 2021-07-27 RX ADMIN — FLUCONAZOLE 400 MG: 200 TABLET ORAL at 09:25

## 2021-07-27 RX ADMIN — HEPARIN SODIUM 5000 UNITS: 5000 INJECTION INTRAVENOUS; SUBCUTANEOUS at 06:07

## 2021-07-27 RX ADMIN — FENOFIBRATE 145 MG: 145 TABLET ORAL at 09:25

## 2021-07-27 RX ADMIN — CETIRIZINE HYDROCHLORIDE 10 MG: 10 TABLET, FILM COATED ORAL at 09:26

## 2021-07-27 RX ADMIN — CARVEDILOL 6.25 MG: 6.25 TABLET, FILM COATED ORAL at 09:26

## 2021-07-28 LAB
1,3 BETA GLUCAN SER-MCNC: <31 PG/ML
A FLAVUS AB SER QL ID: NEGATIVE
A FUMIGATUS AB SER QL ID: NEGATIVE
A NIGER AB SER QL ID: NEGATIVE
BACTERIA SPEC ANAEROBE CULT: NORMAL
GALACTOMANNAN AG SPEC IA-ACNC: 0.03 INDEX (ref 0–0.49)
GAMMA INTERFERON BACKGROUND BLD IA-ACNC: 0 IU/ML
GIE STN SPEC: NORMAL
H CAPSUL AG SER QL IA: <0.5
Lab: NORMAL
M TB IFN-G BLD-IMP: NEGATIVE
M TB IFN-G CD4+ BCKGRND COR BLD-ACNC: 0.01 IU/ML
M TB IFN-G CD4+CD8+ BCKGRND COR BLD-ACNC: 0.01 IU/ML
MITOGEN IGNF BLD-ACNC: >10 IU/ML
QUANTIFERON INCUBATION: NORMAL
SERVICE CMNT-IMP: NORMAL

## 2021-07-29 LAB
B DERMAT AB TITR SER: NEGATIVE {TITER}
BACTERIA SPEC AEROBE CULT: NORMAL
GRAM STN SPEC: NORMAL
MVISTA(R) BLASTOMYCES AG: NORMAL NG/ML
SPECIMEN SOURCE: NORMAL

## 2021-07-30 LAB — H CAPSUL AB SER QL ID: NORMAL

## 2021-08-04 LAB
T GONDII IGG SERPL IA-ACNC: <3 IU/ML
T GONDII IGM CSF IA-ACNC: <3 AU/ML

## 2021-08-10 NOTE — DISCHARGE SUMMARY
CTS Discharge Summary    Patient Care Team:  Germán Casey MD as PCP - General (Family Medicine)  Milton Murillo MD as Consulting Physician (Cardiology)      Date of Admission: 7/23/2021  4:58 AM  Date of Discharge:  7/27/2021    Discharge Diagnosis  Past Medical History:   Diagnosis Date   • Anemia     Acute blood loss Anemia   • Arthritis     back   • Back pain     Chronic   • Mireles's esophagus    • Coronary artery disease 2011    NO STENTS; 4 graft CABG; MI before CABG; recent heart cath shows two grafts are blocked;    • Dyslipidemia    • GERD (gastroesophageal reflux disease)    • H pylori ulcer     Positive   • History of kidney stones     one (passed)   • Hyperlipidemia    • Hypertension    • IHD (ischemic heart disease)     s/p CABG X 4-V   • Lung mass 02/2020    routine CT scan revealed mass in 2020; monitored regularly; recent CT scan 2021 showed additional abnormalities    • Myocardial infarction (CMS/HCC) 2011   • Sleep apnea     wears CPAP   • Thrombocytopenia (CMS/HCC)    • Tinnitus    • Vitamin D deficiency          L VATS wedge 07/23/21    CAD s/p CABG X4 (occluded vein grafts)     Hypercholesteremia    Essential hypertension    Smoker    Class 1 obesity in adult    LLUVIA on CPAP    IGT (HA1C 5.8)     Possible cryptococcus (positive antigen of 1:160)      History of Present Illness  Patient is a 61-year-old male with a history of tobacco abuse, coronary disease, esophagus, GERD, sleep apnea and pulmonary nodule who presents to [facility] today for a scheduled left VATS procedure with wedge resection.  Patient denies any changes in symptoms since being seen in the office last.      Hospital Course  Patient is a 61 y.o. male who was admitted to [facility] on 7/23/2021 for a scheduled left-sided VATS procedure with wedge resection of left upper lobe and left lower lobe.  Surgery was performed by Dr. Rudolph Cm and Dr. Richie Fragoso.  The surgery went well, and patient was placed in the  recovery area in stable condition.  Pulmonology was consulted to follow patient that the course of his hospital stay.    POD 1, patient overall doing well.  Chest tubes placed to waterseal.  Patient found to be stable to be transferred to the telemetry floor.  POD 2, no acute events overnight.  Chest tubes remain in place given output.  POD 3, patient doing well.  Admits to some incisional site pain, but otherwise no complaints.  Chest tubes discontinued on this day.  POD 4, patient doing well.  After being evaluated by all members of healthcare team, patient found to be satisfactory from a postoperative standpoint and stable to be discharged home.    Procedures Performed  Procedure(s):  BRONCHOSCOPY  THORACOSCOPY VIDEO ASSISTED WITH WEDGE RESECTION       Consults:   Consults     Date and Time Order Name Status Description    7/23/2021 12:49 PM Inpatient Infectious Diseases Consult Completed             Discharge Medications     Discharge Medications      New Medications      Instructions Start Date   fluconazole 200 MG tablet  Commonly known as: DIFLUCAN   400 mg, Oral, Every 24 Hours         Continue These Medications      Instructions Start Date   aspirin 325 MG tablet   325 mg, Oral, Daily      atorvastatin 20 MG tablet  Commonly known as: LIPITOR   20 mg, Oral, Nightly      carvedilol 6.25 MG tablet  Commonly known as: COREG   6.25 mg, Oral, 2 Times Daily With Meals      fenofibrate 145 MG tablet  Commonly known as: TRICOR   145 mg, Oral, Daily      fluticasone 50 MCG/ACT nasal spray  Commonly known as: FLONASE   2 sprays, Nasal, As Needed      isosorbide mononitrate 60 MG 24 hr tablet  Commonly known as: IMDUR   60 mg, Oral, Every Evening      loratadine 10 MG tablet  Commonly known as: CLARITIN   TAKE 1 TABLET EVERY DAY      omeprazole 20 MG capsule  Commonly known as: priLOSEC   20 mg, Oral, Daily      ranolazine 500 MG 12 hr tablet  Commonly known as: RANEXA   500 mg, Oral, 2 Times Daily, Needs appointment  for further refills.             Discharge Diet:   Diet Instructions     Diet: Cardiac, Consistent Carbohydrate      Discharge Diet:  Cardiac  Consistent Carbohydrate             Activity at Discharge:   Activity Instructions     Activity as Tolerated      Bathing Restrictions      Type of Restriction: Bathing    Bathing Restrictions: No Tub Bath    Driving Restrictions      Type of Restriction: Driving    Driving Restrictions: No Driving While Taking Narcotics    Lifting Restrictions      Type of Restriction: Lifting    Lifting Restrictions: Lifting Restriction (Indicate Limit)    Weight Limit (Pounds): 15    Length of Lifting Restriction: Until after follow-up appointment.    Other Activity Instructions      Activity Instructions: No lifting greater than 10 pounds and no driving until follow-up appointment.  No driving while taking narcotics.        Do not drive while taking narcotics    Follow-up Appointments  Future Appointments   Date Time Provider Department Center   8/19/2021 10:30 AM Rudolph Cm MD [facility]   11/23/2021  2:00 PM Khushi Guzmán APRN [facility] PA-C  08/10/21  15:26 EDT

## 2021-08-19 ENCOUNTER — OFFICE VISIT (OUTPATIENT)
Dept: CARDIAC SURGERY | Facility: CLINIC | Age: 61
End: 2021-08-19

## 2021-08-19 VITALS
WEIGHT: 199 LBS | DIASTOLIC BLOOD PRESSURE: 60 MMHG | SYSTOLIC BLOOD PRESSURE: 118 MMHG | TEMPERATURE: 97.3 F | HEART RATE: 72 BPM | OXYGEN SATURATION: 98 % | BODY MASS INDEX: 31.98 KG/M2 | HEIGHT: 66 IN

## 2021-08-19 DIAGNOSIS — R91.1 SOLITARY PULMONARY NODULE: ICD-10-CM

## 2021-08-19 DIAGNOSIS — I25.118 CORONARY ARTERY DISEASE OF NATIVE ARTERY OF NATIVE HEART WITH STABLE ANGINA PECTORIS (HCC): Primary | ICD-10-CM

## 2021-08-19 DIAGNOSIS — B45.9 CRYPTOCOCCUS (HCC): ICD-10-CM

## 2021-08-19 DIAGNOSIS — R91.1 NODULE OF UPPER LOBE OF LEFT LUNG: ICD-10-CM

## 2021-08-19 DIAGNOSIS — K22.70 BARRETT'S ESOPHAGUS WITHOUT DYSPLASIA: ICD-10-CM

## 2021-08-19 PROCEDURE — 99024 POSTOP FOLLOW-UP VISIT: CPT | Performed by: THORACIC SURGERY (CARDIOTHORACIC VASCULAR SURGERY)

## 2021-08-19 PROCEDURE — 71046 X-RAY EXAM CHEST 2 VIEWS: CPT | Performed by: THORACIC SURGERY (CARDIOTHORACIC VASCULAR SURGERY)

## 2021-08-19 NOTE — PROGRESS NOTES
08/19/2021  Patient Information  Sawyer Tilley                                                                                          [address] [place] [address]   1960  'PCP/Referring Physician'  Germán Casey MD  No ref. provider found  Chief Complaint   Patient presents with   • Post-op Follow-up     hosp f/u S/P LT VATs LT upper lobe and wedge resection on 7/23. Pt stated he has numbness in his LT side of chest along with LT thigh.        CC: I feel great    History of Present Illness: 61-year-old  male now 1 month status post left VATS with subsequent small thoracotomy and wedge resection of lesions in the left lung.  Pathology was consistent with granulomatous infection.  It was felt by infectious disease that this was most likely histoplasmosis.  It should be noted that the patient did have a positive titer for cryptococcus but work-up to this point was not indicative of cryptococcus.  Patient is currently being treated and followed by infectious disease.  This gentleman has a past history of Mireles's esophagus and has also previously had coronary artery bypass grafting.  He has done well since his operative procedure.  He is off febrile.  He denies fever, chills, and night sweats.  He has not had cough or hemoptysis.  He has had a 10 pound weight loss.  He states that his incisions have healed well with no areas of erythema.      Patient Active Problem List   Diagnosis   • CAD s/p CABG X4 (occluded vein grafts)    • Hypercholesteremia   • Essential hypertension   • Murmur, cardiac   • L VATS wedge 07/23/21   • Smoker   • Class 1 obesity in adult   • LLUVIA on CPAP   • IGT (HA1C 5.8)    • Possible cryptococcus (positive antigen of 1:160)     Past Medical History:   Diagnosis Date   • Anemia     Acute blood loss Anemia   • Arthritis     back   • Back pain     Chronic   • Mireles's esophagus    • Coronary artery disease 2011    NO STENTS; 4 graft CABG; MI before CABG; recent heart  cath shows two grafts are blocked;    • Dyslipidemia    • GERD (gastroesophageal reflux disease)    • H pylori ulcer     Positive   • History of kidney stones     one (passed)   • Hyperlipidemia    • Hypertension    • IHD (ischemic heart disease)     s/p CABG X 4-V   • Lung mass 02/2020    routine CT scan revealed mass in 2020; monitored regularly; recent CT scan 2021 showed additional abnormalities    • Myocardial infarction (CMS/HCC) 2011   • Sleep apnea     wears CPAP   • Thrombocytopenia (CMS/HCC)    • Tinnitus    • Vitamin D deficiency      Past Surgical History:   Procedure Laterality Date   • BACK SURGERY  02/2010   • BRONCHOSCOPY N/A 7/9/2021    Procedure: BRONCHOSCOPY WITH ENDOBRONCHIAL ULTRASOUND;  Surgeon: Rudolph Cm MD;  Location:  [facility];  Service: Cardiothoracic;  Laterality: N/A;  balloon intact    • BRONCHOSCOPY N/A 7/23/2021    Procedure: BRONCHOSCOPY;  Surgeon: Rudolph Cm MD;  Location:  [facility];  Service: Cardiothoracic;  Laterality: N/A;   • CARDIAC CATHETERIZATION  01/08/2011    Cath-60%LAD, 90%D1, 75%LCX, 80%OM. 100%RCA.   • CARDIAC CATHETERIZATION  03/03/2020    Patent LIMA -LAD & PHYLLIS - D1. 100% SVG -OM & SVG-PDA.   • CARDIOVASCULAR STRESS TEST  11/03/2011    Stress-4min, 41sec, 84%THR, 160/86. Inferior Ischemia   • CARDIOVASCULAR STRESS TEST  09/30/2014    L.Myview-no ischemia, small fixed inferior infarct   • CARDIOVASCULAR STRESS TEST  07/22/2019    L.Cardiolite- EF 57%. Inferolateral Infarct with periinfarct Ischemia.   • CATARACT EXTRACTION Bilateral    • COLONOSCOPY     • CORONARY ARTERY BYPASS GRAFT  01/10/2011    LIMA-LAD, SVG-OM, SVG-PDA. PHYLLIS- D1   • ECHO - CONVERTED  11/03/2011    Echo-EF 65/70%   • ECHO - CONVERTED  09/30/2004    Echo-EF 65%, mild MR, borderline pulm HTN   • ECHO - CONVERTED  07/22/2019    EF 60%. Mild- Mod MR. LA- 4.5 Cm   • ENDOSCOPY     • HYDROCELE EXCISION / REPAIR Left 2014   • OTHER SURGICAL HISTORY  01/09/2011     Carotid US- No sig.  stenosis.    • THORACOSCOPY VIDEO ASSISTED WITH LOBECTOMY N/A 7/23/2021    Procedure: THORACOSCOPY VIDEO ASSISTED WITH WEDGE RESECTION;  Surgeon: Rudolph Cm MD;  Location: [facility];  Service: Cardiothoracic;  Laterality: N/A;       Current Outpatient Medications:   •  aspirin 325 MG tablet, Take 325 mg by mouth daily., Disp: , Rfl:   •  atorvastatin (LIPITOR) 20 MG tablet, Take 1 tablet by mouth Every Night., Disp: 90 tablet, Rfl: 3  •  carvedilol (COREG) 6.25 MG tablet, Take 1 tablet by mouth 2 (Two) Times a Day With Meals., Disp: 180 tablet, Rfl: 2  •  fenofibrate (TRICOR) 145 MG tablet, Take 1 tablet by mouth Daily., Disp: 90 tablet, Rfl: 2  •  fluconazole (DIFLUCAN) 200 MG tablet, Take 2 tablets by mouth Daily for 27 doses. Indications: Pneumonia, Infection with Dysregulated Inflammatory Response, Disp: 54 tablet, Rfl: 0  •  fluticasone (FLONASE) 50 MCG/ACT nasal spray, 2 sprays into the nostril(s) as directed by provider As Needed., Disp: , Rfl:   •  isosorbide mononitrate (IMDUR) 60 MG 24 hr tablet, Take 1 tablet by mouth Every Evening., Disp: 90 tablet, Rfl: 3  •  loratadine (CLARITIN) 10 MG tablet, TAKE 1 TABLET EVERY DAY (Patient taking differently: Take 10 mg by mouth Daily.), Disp: 90 tablet, Rfl: 2  •  omeprazole (priLOSEC) 20 MG capsule, Take 20 mg by mouth Daily., Disp: , Rfl:   •  ranolazine (RANEXA) 500 MG 12 hr tablet, Take 1 tablet by mouth 2 (Two) Times a Day. Needs appointment for further refills., Disp: 180 tablet, Rfl: 2  Allergies   Allergen Reactions   • Chantix [Varenicline Tartrate] Delirium     dreams   • Keflex [Cephalexin] Itching     Social History     Socioeconomic History   • Marital status:      Spouse name: Not on file   • Number of children: 2   • Years of education: Not on file   • Highest education level: Not on file   Tobacco Use   • Smoking status: Current Every Day Smoker     Packs/day: 1.00     Years: 46.00     Pack years: 46.00     Types: Cigarettes   •  Smokeless tobacco: Never Used   • Tobacco comment: patient reports has cut back on cigarette smoking, not ready to quit yet, counseled patient on effect's of smoking on health. Pt states that  he is quitting todat 7/1/2021   Vaping Use   • Vaping Use: Never used   Substance and Sexual Activity   • Alcohol use: No   • Drug use: No   • Sexual activity: Defer     Family History   Problem Relation Age of Onset   • Hypertension Mother    • Alzheimer's disease Mother    • Hypertension Father    • Alzheimer's disease Father    • Hypertension Other        ROS, past medical history, surgical history, family history, social history and vitals  reviewed by me and corrected as needed.        Review of Systems   Constitutional: Negative for chills, fever, malaise/fatigue, night sweats and weight loss.   HENT: Negative for hearing loss, odynophagia and sore throat.    Eyes: Negative for blurred vision, vision loss in left eye, vision loss in right eye and visual disturbance.   Cardiovascular: Negative for chest pain, dyspnea on exertion, leg swelling, orthopnea and palpitations.   Respiratory: Negative for cough, hemoptysis, shortness of breath and wheezing.    Endocrine: Negative for cold intolerance, heat intolerance, polydipsia, polyphagia and polyuria.   Hematologic/Lymphatic: Does not bruise/bleed easily.   Skin: Negative for itching and rash.   Musculoskeletal: Negative for joint pain, joint swelling and myalgias.        Mild numbness left anterior thigh.  The symptoms are slowly resolving.  No muscle weakness.  Left VATS incision completely healed.  2 sutures remain in the chest tube site these were removed by me in the clinic today   Gastrointestinal: Negative for abdominal pain, constipation, diarrhea, hematemesis, hematochezia, melena, nausea and vomiting.   Genitourinary: Negative for dysuria, frequency and hematuria.   Neurological: Negative for focal weakness, headaches, numbness and seizures.  "  Psychiatric/Behavioral: Negative for altered mental status and suicidal ideas. The patient is not nervous/anxious.    All other systems reviewed and are negative.      Vitals:    08/19/21 1022   BP: 118/60   BP Location: Right arm   Pulse: 72   Temp: 97.3 °F (36.3 °C)   SpO2: 98%   Weight: 90.3 kg (199 lb)   Height: 167.6 cm (66\")        Physical Exam  Vitals and nursing note reviewed.   Constitutional:       General: He is not in acute distress.     Appearance: Normal appearance. He is normal weight.   HENT:      Head: Normocephalic and atraumatic.      Right Ear: External ear normal.      Left Ear: External ear normal.      Nose: Nose normal.      Mouth/Throat:      Mouth: Mucous membranes are moist.   Eyes:      Extraocular Movements: Extraocular movements intact.      Pupils: Pupils are equal, round, and reactive to light.   Neck:      Vascular: No carotid bruit.   Cardiovascular:      Rate and Rhythm: Normal rate and regular rhythm.      Pulses: Normal pulses.      Heart sounds: Normal heart sounds. No murmur heard.     Pulmonary:      Effort: Pulmonary effort is normal. No respiratory distress.      Breath sounds: Normal breath sounds. No wheezing, rhonchi or rales.   Abdominal:      General: Abdomen is flat. Bowel sounds are normal.      Palpations: Abdomen is soft. There is no mass.      Tenderness: There is no abdominal tenderness. There is no guarding.   Musculoskeletal:         General: No swelling or tenderness.      Cervical back: Normal range of motion. No rigidity. No muscular tenderness.      Right lower leg: No edema.      Left lower leg: No edema.   Lymphadenopathy:      Cervical: No cervical adenopathy.   Skin:     General: Skin is warm and dry.      Capillary Refill: Capillary refill takes less than 2 seconds.      Coloration: Skin is not jaundiced.      Findings: No erythema.   Neurological:      General: No focal deficit present.      Mental Status: He is alert and oriented to person, place, " and time. Mental status is at baseline.   Psychiatric:         Mood and Affect: Mood normal.         Behavior: Behavior normal.         Thought Content: Thought content normal.         Judgment: Judgment normal.         Labs: None    Imaging: PA and lateral chest x-ray reviewed.  Findings are consistent with current postoperative state.  Report pending at the time of this dictation.    Assessment: Stable postoperative course    Plan: Return to clinic in 4 months with a repeat chest x-ray.  Continue close follow-up with infectious disease.  Continue close follow-up with family physician Dr. Carmela Cm MD  [facility]  08/19/21   11:49 EDT

## 2021-09-03 LAB
MYCOBACTERIUM SPEC CULT: NORMAL
NIGHT BLUE STAIN TISS: NORMAL

## 2021-09-06 LAB
FUNGUS WND CULT: NORMAL
MYCOBACTERIUM SPEC CULT: NORMAL
NIGHT BLUE STAIN TISS: NORMAL

## 2021-09-29 LAB — FUNGUS WND CULT: ABNORMAL

## 2021-11-23 ENCOUNTER — OFFICE VISIT (OUTPATIENT)
Dept: CARDIOLOGY | Facility: CLINIC | Age: 61
End: 2021-11-23

## 2021-11-23 VITALS
SYSTOLIC BLOOD PRESSURE: 108 MMHG | BODY MASS INDEX: 31.98 KG/M2 | HEIGHT: 66 IN | HEART RATE: 63 BPM | OXYGEN SATURATION: 95 % | RESPIRATION RATE: 16 BRPM | DIASTOLIC BLOOD PRESSURE: 74 MMHG | WEIGHT: 199 LBS | TEMPERATURE: 95.9 F

## 2021-11-23 DIAGNOSIS — Z99.89 OSA ON CPAP: Chronic | ICD-10-CM

## 2021-11-23 DIAGNOSIS — I10 ESSENTIAL HYPERTENSION: Chronic | ICD-10-CM

## 2021-11-23 DIAGNOSIS — I25.118 CORONARY ARTERY DISEASE OF NATIVE ARTERY OF NATIVE HEART WITH STABLE ANGINA PECTORIS (HCC): Primary | ICD-10-CM

## 2021-11-23 DIAGNOSIS — E78.00 HYPERCHOLESTEREMIA: ICD-10-CM

## 2021-11-23 DIAGNOSIS — G47.33 OSA ON CPAP: Chronic | ICD-10-CM

## 2021-11-23 PROCEDURE — 99214 OFFICE O/P EST MOD 30 MIN: CPT | Performed by: NURSE PRACTITIONER

## 2021-11-23 RX ORDER — METHOCARBAMOL 750 MG/1
750 TABLET, FILM COATED ORAL DAILY
COMMUNITY

## 2021-11-23 RX ORDER — DIPHENOXYLATE HYDROCHLORIDE AND ATROPINE SULFATE 2.5; .025 MG/1; MG/1
1 TABLET ORAL DAILY
COMMUNITY
End: 2022-06-16

## 2021-11-23 RX ORDER — BISACODYL 5 MG/1
262 TABLET, COATED ORAL DAILY
COMMUNITY
End: 2022-06-16

## 2022-01-20 RX ORDER — CARVEDILOL 6.25 MG/1
TABLET ORAL
Qty: 180 TABLET | Refills: 2 | Status: SHIPPED | OUTPATIENT
Start: 2022-01-20 | End: 2022-06-16 | Stop reason: SDUPTHER

## 2022-01-24 RX ORDER — RANOLAZINE 500 MG/1
TABLET, EXTENDED RELEASE ORAL
Qty: 180 TABLET | Refills: 2 | Status: SHIPPED | OUTPATIENT
Start: 2022-01-24 | End: 2022-06-16 | Stop reason: SDUPTHER

## 2022-01-27 ENCOUNTER — OFFICE VISIT (OUTPATIENT)
Dept: CARDIAC SURGERY | Facility: CLINIC | Age: 62
End: 2022-01-27

## 2022-01-27 VITALS
DIASTOLIC BLOOD PRESSURE: 68 MMHG | TEMPERATURE: 96.9 F | WEIGHT: 207 LBS | HEIGHT: 66 IN | OXYGEN SATURATION: 99 % | SYSTOLIC BLOOD PRESSURE: 129 MMHG | HEART RATE: 56 BPM | BODY MASS INDEX: 33.27 KG/M2

## 2022-01-27 DIAGNOSIS — B45.9 CRYPTOCOCCUS: ICD-10-CM

## 2022-01-27 DIAGNOSIS — R91.1 NODULE OF UPPER LOBE OF LEFT LUNG: ICD-10-CM

## 2022-01-27 DIAGNOSIS — I25.118 CORONARY ARTERY DISEASE OF NATIVE ARTERY OF NATIVE HEART WITH STABLE ANGINA PECTORIS: Primary | ICD-10-CM

## 2022-01-27 DIAGNOSIS — F17.200 SMOKER: ICD-10-CM

## 2022-01-27 PROCEDURE — 99213 OFFICE O/P EST LOW 20 MIN: CPT | Performed by: THORACIC SURGERY (CARDIOTHORACIC VASCULAR SURGERY)

## 2022-01-27 RX ORDER — FLUCONAZOLE 200 MG/1
200 TABLET ORAL 2 TIMES DAILY
COMMUNITY
End: 2022-06-16

## 2022-01-27 NOTE — PROGRESS NOTES
01/27/2022  Patient Information  Sawyer Tilley                                                                                          [address] [place] [address]   1960  'PCP/Referring Physician'  Germán Casey MD  No ref. provider found  Chief Complaint   Patient presents with   • Follow-up     4 month follow-up. Patient had CT chest 12/9/21 at [facility]. lung nodule- s/p left VATS with left upper lobe wedge resection        CC: I am back for my postop checkup    History of Present Illness: 61-year-old  male male 6 months status post left thoracotomy with resection of pulmonary nodules in both left upper lobe and left lower lobe.  The nodules on analysis were secondary to cryptococcus infection.  The patient was seen by infectious disease and has been treated with fluconazole since that time.  He continues to do well.  He has mild shortness of breath with activity but no other symptoms.  He states that for the past couple of months he has had a persistent cough but this seems to be resolving.  He has not had fever or chills he has not had hemoptysis or weight loss.  CT scan done 1 month ago was essentially stable.  There was a right lower lobe nodule that has been present since the surgical procedure and remained stable..  Scarring and postoperative changes on the left are stable.      Patient Active Problem List   Diagnosis   • CAD s/p CABG X4 (occluded vein grafts)    • Hypercholesteremia   • Essential hypertension   • Murmur, cardiac   • L VATS wedge 07/23/21   • Smoker   • Class 1 obesity in adult   • LLUVIA on CPAP   • IGT (HA1C 5.8)    • Possible cryptococcus (positive antigen of 1:160)     Past Medical History:   Diagnosis Date   • Anemia     Acute blood loss Anemia   • Arthritis     back   • Back pain     Chronic   • Mireles's esophagus    • Coronary artery disease 2011    NO STENTS; 4 graft CABG; MI before CABG; recent heart cath shows two grafts are blocked;    • Dyslipidemia    •  GERD (gastroesophageal reflux disease)    • H pylori ulcer     Positive   • History of kidney stones     one (passed)   • Hyperlipidemia    • Hypertension    • IHD (ischemic heart disease)     s/p CABG X 4-V   • Lung mass 02/2020    routine CT scan revealed mass in 2020; monitored regularly; recent CT scan 2021 showed additional abnormalities    • Myocardial infarction (HCC) 2011   • Sleep apnea     wears CPAP   • Thrombocytopenia (HCC)    • Tinnitus    • Vitamin D deficiency      Past Surgical History:   Procedure Laterality Date   • BACK SURGERY  02/2010   • BRONCHOSCOPY N/A 7/9/2021    Procedure: BRONCHOSCOPY WITH ENDOBRONCHIAL ULTRASOUND;  Surgeon: Rudolph Cm MD;  Location:  [facility];  Service: Cardiothoracic;  Laterality: N/A;  balloon intact    • BRONCHOSCOPY N/A 7/23/2021    Procedure: BRONCHOSCOPY;  Surgeon: Rudolph Cm MD;  Location:  [facility];  Service: Cardiothoracic;  Laterality: N/A;   • CARDIAC CATHETERIZATION  01/08/2011    Cath-60%LAD, 90%D1, 75%LCX, 80%OM. 100%RCA.   • CARDIAC CATHETERIZATION  03/03/2020    Patent LIMA -LAD & PHYLLIS - D1. 100% SVG -OM & SVG-PDA.   • CARDIOVASCULAR STRESS TEST  11/03/2011    Stress-4min, 41sec, 84%THR, 160/86. Inferior Ischemia   • CARDIOVASCULAR STRESS TEST  09/30/2014    L.Myview-no ischemia, small fixed inferior infarct   • CARDIOVASCULAR STRESS TEST  07/22/2019    L.Cardiolite- EF 57%. Inferolateral Infarct with periinfarct Ischemia.   • CATARACT EXTRACTION Bilateral    • COLONOSCOPY     • CORONARY ARTERY BYPASS GRAFT  01/10/2011    LIMA-LAD, SVG-OM, SVG-PDA. PHYLLIS- D1   • ECHO - CONVERTED  11/03/2011    Echo-EF 65/70%   • ECHO - CONVERTED  09/30/2004    Echo-EF 65%, mild MR, borderline pulm HTN   • ECHO - CONVERTED  07/22/2019    EF 60%. Mild- Mod MR. LA- 4.5 Cm   • ENDOSCOPY     • HYDROCELE EXCISION / REPAIR Left 2014   • OTHER SURGICAL HISTORY  01/09/2011     Carotid US- No sig. stenosis.    • THORACOSCOPY VIDEO ASSISTED WITH LOBECTOMY N/A  7/23/2021    Procedure: THORACOSCOPY VIDEO ASSISTED WITH WEDGE RESECTION;  Surgeon: Rudolph Cm MD;  Location: [facility];  Service: Cardiothoracic;  Laterality: N/A;       Current Outpatient Medications:   •  aspirin 325 MG tablet, Take 325 mg by mouth daily., Disp: , Rfl:   •  atorvastatin (LIPITOR) 20 MG tablet, Take 1 tablet by mouth Every Night., Disp: 90 tablet, Rfl: 3  •  Bismuth Subsalicylate (Kaopectate) 262 MG tablet, Take 262 mg by mouth Daily., Disp: , Rfl:   •  carvedilol (COREG) 6.25 MG tablet, TAKE 1 TABLET TWICE DAILY WITH MEALS, Disp: 180 tablet, Rfl: 2  •  diphenoxylate-atropine (LOMOTIL) 2.5-0.025 MG per tablet, Take 1 tablet by mouth Daily., Disp: , Rfl:   •  fenofibrate (TRICOR) 145 MG tablet, Take 1 tablet by mouth Daily., Disp: 90 tablet, Rfl: 2  •  fluconazole (DIFLUCAN) 200 MG tablet, Take 200 mg by mouth 2 (Two) Times a Day., Disp: , Rfl:   •  fluticasone (FLONASE) 50 MCG/ACT nasal spray, 2 sprays into the nostril(s) as directed by provider As Needed., Disp: , Rfl:   •  isosorbide mononitrate (IMDUR) 60 MG 24 hr tablet, Take 1 tablet by mouth Every Evening., Disp: 90 tablet, Rfl: 3  •  loratadine (CLARITIN) 10 MG tablet, TAKE 1 TABLET EVERY DAY (Patient taking differently: Take 10 mg by mouth Daily.), Disp: 90 tablet, Rfl: 2  •  methocarbamol (ROBAXIN) 750 MG tablet, Take 750 mg by mouth Daily., Disp: , Rfl:   •  omeprazole (priLOSEC) 20 MG capsule, Take 20 mg by mouth Daily., Disp: , Rfl:   •  Probiotic Product (PROBIOTIC DAILY PO), Take  by mouth Daily., Disp: , Rfl:   •  ranolazine (RANEXA) 500 MG 12 hr tablet, TAKE 1 TABLET TWICE DAILY (NEED MD APPOINTMENT FOR REFILLS), Disp: 180 tablet, Rfl: 2  Allergies   Allergen Reactions   • Chantix [Varenicline Tartrate] Delirium     dreams   • Keflex [Cephalexin] Itching     Social History     Socioeconomic History   • Marital status:    • Number of children: 2   Tobacco Use   • Smoking status: Current Every Day Smoker     Packs/day:  0.50     Years: 46.00     Pack years: 23.00     Types: Cigarettes   • Smokeless tobacco: Never Used   • Tobacco comment: patient reports has cut back on cigarette smoking, not ready to quit yet, counseled patient on effect's of smoking on health. Pt states that  he is quitting todat 7/1/2021   Vaping Use   • Vaping Use: Never used   Substance and Sexual Activity   • Alcohol use: No   • Drug use: No   • Sexual activity: Defer     Family History   Problem Relation Age of Onset   • Hypertension Mother    • Alzheimer's disease Mother    • Hypertension Father    • Alzheimer's disease Father    • Hypertension Other        ROS, past medical history, surgical history, family history, social history and vitals  reviewed by me and corrected as needed.        Review of Systems   Constitutional: Positive for night sweats. Negative for chills, fever, malaise/fatigue and weight loss.   HENT: Positive for hearing loss (right ear). Negative for odynophagia and sore throat.    Cardiovascular: Positive for dyspnea on exertion. Negative for chest pain, leg swelling, orthopnea and palpitations.   Respiratory: Positive for cough. Negative for hemoptysis.    Endocrine: Negative for cold intolerance, heat intolerance, polydipsia, polyphagia and polyuria.   Hematologic/Lymphatic: Does not bruise/bleed easily.   Skin: Negative for itching and rash.   Musculoskeletal: Positive for back pain. Negative for joint pain, joint swelling and myalgias.   Gastrointestinal: Negative for abdominal pain, constipation, diarrhea, hematemesis, hematochezia, melena, nausea and vomiting.   Genitourinary: Negative for dysuria, frequency and hematuria.   Neurological: Positive for numbness (under left breast). Negative for focal weakness, headaches and seizures.   Psychiatric/Behavioral: Negative for depression and suicidal ideas. The patient is not nervous/anxious.    All other systems reviewed and are negative.      Vitals:    01/27/22 0854   BP: 129/68   BP  "Location: Right arm   Patient Position: Sitting   Pulse: 56   Temp: 96.9 °F (36.1 °C)   SpO2: 99%   Weight: 93.9 kg (207 lb)   Height: 167.6 cm (66\")        Physical Exam  Vitals and nursing note reviewed.   Constitutional:       General: He is not in acute distress.     Appearance: Normal appearance. He is normal weight.   HENT:      Head: Normocephalic and atraumatic.      Right Ear: External ear normal.      Left Ear: External ear normal.      Nose: Nose normal.      Mouth/Throat:      Mouth: Mucous membranes are moist.   Eyes:      Extraocular Movements: Extraocular movements intact.      Pupils: Pupils are equal, round, and reactive to light.   Neck:      Vascular: No carotid bruit.   Cardiovascular:      Rate and Rhythm: Normal rate and regular rhythm.      Pulses: Normal pulses.      Heart sounds: Normal heart sounds. No murmur heard.      Pulmonary:      Effort: Pulmonary effort is normal. No respiratory distress.      Breath sounds: No wheezing, rhonchi or rales.   Abdominal:      General: Abdomen is flat. Bowel sounds are normal.      Palpations: Abdomen is soft. There is no mass.      Tenderness: There is no abdominal tenderness. There is no guarding.   Musculoskeletal:         General: No swelling or tenderness.      Cervical back: Normal range of motion. No rigidity. No muscular tenderness.      Right lower leg: No edema.      Left lower leg: No edema.      Comments: Left thoracotomy incision is healing without erythema and without drainage.  There is mild tenderness anterior to the thoracotomy incision.   Lymphadenopathy:      Cervical: No cervical adenopathy.   Skin:     General: Skin is warm and dry.      Capillary Refill: Capillary refill takes less than 2 seconds.      Coloration: Skin is not jaundiced.      Findings: No erythema.   Neurological:      General: No focal deficit present.      Mental Status: He is alert and oriented to person, place, and time. Mental status is at baseline. "   Psychiatric:         Mood and Affect: Mood normal.         Behavior: Behavior normal.         Thought Content: Thought content normal.         Judgment: Judgment normal.         Labs: None    Imaging: CT scan of the chest reviewed as noted in the present illness.    Assessment: Stable postoperative course to this point.    Plan: Patient will return to this clinic in 1 year with a repeat CT scan of the chest.  Meanwhile he will continue follow-up with infectious disease (currently scheduled to be seen in April 2022).  He will continue Diflucan until seen by infectious disease.    Electronically signed by Rudolph Cm MD, 01/27/22, 11:41 AM EST.

## 2022-02-17 RX ORDER — FENOFIBRATE 145 MG/1
145 TABLET, COATED ORAL DAILY
Qty: 90 TABLET | Refills: 2 | Status: SHIPPED | OUTPATIENT
Start: 2022-02-17 | End: 2022-06-16 | Stop reason: SDUPTHER

## 2022-02-24 DIAGNOSIS — E78.00 HYPERCHOLESTEREMIA: ICD-10-CM

## 2022-02-24 RX ORDER — ATORVASTATIN CALCIUM 20 MG/1
TABLET, FILM COATED ORAL
Qty: 90 TABLET | Refills: 3 | Status: SHIPPED | OUTPATIENT
Start: 2022-02-24 | End: 2022-06-16 | Stop reason: SDUPTHER

## 2022-03-03 DIAGNOSIS — Z91.09 ENVIRONMENTAL ALLERGIES: ICD-10-CM

## 2022-03-03 RX ORDER — LORATADINE 10 MG/1
TABLET ORAL
Qty: 90 TABLET | Refills: 2 | OUTPATIENT
Start: 2022-03-03

## 2022-05-23 DIAGNOSIS — I25.118 CORONARY ARTERY DISEASE OF NATIVE ARTERY OF NATIVE HEART WITH STABLE ANGINA PECTORIS: ICD-10-CM

## 2022-05-23 RX ORDER — ISOSORBIDE MONONITRATE 60 MG/1
TABLET, EXTENDED RELEASE ORAL
Qty: 90 TABLET | Refills: 3 | Status: SHIPPED | OUTPATIENT
Start: 2022-05-23 | End: 2022-06-16 | Stop reason: SDUPTHER

## 2022-06-16 ENCOUNTER — OFFICE VISIT (OUTPATIENT)
Dept: CARDIOLOGY | Facility: CLINIC | Age: 62
End: 2022-06-16

## 2022-06-16 VITALS
BODY MASS INDEX: 33.01 KG/M2 | HEART RATE: 62 BPM | HEIGHT: 66 IN | WEIGHT: 205.4 LBS | DIASTOLIC BLOOD PRESSURE: 82 MMHG | SYSTOLIC BLOOD PRESSURE: 140 MMHG

## 2022-06-16 DIAGNOSIS — F17.200 SMOKER: ICD-10-CM

## 2022-06-16 DIAGNOSIS — I10 ESSENTIAL HYPERTENSION: ICD-10-CM

## 2022-06-16 DIAGNOSIS — G47.33 OSA ON CPAP: ICD-10-CM

## 2022-06-16 DIAGNOSIS — E66.9 OBESITY (BMI 30.0-34.9): ICD-10-CM

## 2022-06-16 DIAGNOSIS — E78.00 HYPERCHOLESTEREMIA: ICD-10-CM

## 2022-06-16 DIAGNOSIS — I25.118 CORONARY ARTERY DISEASE OF NATIVE ARTERY OF NATIVE HEART WITH STABLE ANGINA PECTORIS: ICD-10-CM

## 2022-06-16 DIAGNOSIS — B45.9 CRYPTOCOCCUS: ICD-10-CM

## 2022-06-16 DIAGNOSIS — I25.118 CORONARY ARTERY DISEASE OF NATIVE ARTERY OF NATIVE HEART WITH STABLE ANGINA PECTORIS: Primary | ICD-10-CM

## 2022-06-16 DIAGNOSIS — Z99.89 OSA ON CPAP: ICD-10-CM

## 2022-06-16 DIAGNOSIS — R01.1 MURMUR, CARDIAC: ICD-10-CM

## 2022-06-16 DIAGNOSIS — R91.1 NODULE OF UPPER LOBE OF LEFT LUNG: ICD-10-CM

## 2022-06-16 PROCEDURE — 99214 OFFICE O/P EST MOD 30 MIN: CPT | Performed by: NURSE PRACTITIONER

## 2022-06-16 RX ORDER — CARVEDILOL 6.25 MG/1
6.25 TABLET ORAL 2 TIMES DAILY WITH MEALS
Qty: 180 TABLET | Refills: 2 | Status: SHIPPED | OUTPATIENT
Start: 2022-06-16

## 2022-06-16 RX ORDER — ISOSORBIDE MONONITRATE 60 MG/1
60 TABLET, EXTENDED RELEASE ORAL EVERY EVENING
Qty: 90 TABLET | Refills: 3 | Status: SHIPPED | OUTPATIENT
Start: 2022-06-16

## 2022-06-16 RX ORDER — ATORVASTATIN CALCIUM 20 MG/1
20 TABLET, FILM COATED ORAL NIGHTLY
Qty: 90 TABLET | Refills: 3 | Status: SHIPPED | OUTPATIENT
Start: 2022-06-16

## 2022-06-16 RX ORDER — RANOLAZINE 500 MG/1
500 TABLET, EXTENDED RELEASE ORAL 2 TIMES DAILY
Qty: 180 TABLET | Refills: 2 | Status: SHIPPED | OUTPATIENT
Start: 2022-06-16 | End: 2022-12-21

## 2022-06-16 RX ORDER — FENOFIBRATE 145 MG/1
145 TABLET, COATED ORAL DAILY
Qty: 90 TABLET | Refills: 2 | Status: SHIPPED | OUTPATIENT
Start: 2022-06-16 | End: 2023-03-06

## 2022-06-16 NOTE — PROGRESS NOTES
Chief Complaint   Patient presents with   • Follow-up     Pt is here for cardiac follow up.  He denies CP, SOB, dizziness or palpitations.  He does take a daily ASA.   • Med Refill     Pt request 90 day refills to be sent to [facility].  Medications were verified by medication list during today's OV.   • Lab Work     Pt states he is unsure of when his last labs were.       Cardiac Complaints  none      Subjective   Sawyer Tilley is a 62 y.o. male with HTN, hyperlipidemia, and IHD s/p bypass in 2011. Lexiscan on 7/22/2019 showed inferior lateral wall infarct with jhoana-infarct ischemia. Medical management was tried with Imdur increased. He had more chest pain, on 3/3/20 he underwent cardiac cath showing patent LIMA to LAD and PHYLLIS to D1, but both OM and PDA vein grafts occluded. He appeared to have collateralization.       He apparently had enlargement of lung nodule referred back to Dr. Cm for evaluation. CT and PET Imaging appeared to show primary Lung CA with local metastasis. He underwent left VATS with small thoracotomy and wedge resection of left lung lesions. Pathology was consistent with granulomatous infection.  It was felt by infectious disease that this was most likely histoplasmosis. He is now following with ID specialist, Dr. Richard Koch on long-term antifungal/fluconazole. FU with Dr. Cm in January 2022, current plan of care continued. CT of chest stable, RLL nodule stable, repeat scan advised in 1 year, continued to follow with ID and remain on diflucan until he saw ID was the plan.    He returns today for follow up and denies any new concerns. No chest pain, SOA, palpitations, dizziness, or syncope noted. He continues to take daily ASA without concerns, bleed and bruise denied.  He stays fairly active at home without concerns. He admits PCP continues to follow labs, but he is unsure of last check. Refills requested.        Cardiac History  Past Surgical History:   Procedure  Laterality Date   • BACK SURGERY  02/2010   • BRONCHOSCOPY N/A 7/9/2021    Procedure: BRONCHOSCOPY WITH ENDOBRONCHIAL ULTRASOUND;  Surgeon: Rudolph Cm MD;  Location:  [facility];  Service: Cardiothoracic;  Laterality: N/A;  balloon intact    • BRONCHOSCOPY N/A 7/23/2021    Procedure: BRONCHOSCOPY;  Surgeon: Rudolph Cm MD;  Location:  [facility];  Service: Cardiothoracic;  Laterality: N/A;   • CARDIAC CATHETERIZATION  01/08/2011    Cath-60%LAD, 90%D1, 75%LCX, 80%OM. 100%RCA.   • CARDIAC CATHETERIZATION  03/03/2020    Patent LIMA -LAD & PHYLLIS - D1. 100% SVG -OM & SVG-PDA.   • CARDIOVASCULAR STRESS TEST  11/03/2011    Stress-4min, 41sec, 84%THR, 160/86. Inferior Ischemia   • CARDIOVASCULAR STRESS TEST  09/30/2014    L.Myview-no ischemia, small fixed inferior infarct   • CARDIOVASCULAR STRESS TEST  07/22/2019    L.Cardiolite- EF 57%. Inferolateral Infarct with periinfarct Ischemia.   • CATARACT EXTRACTION Bilateral    • COLONOSCOPY     • CORONARY ARTERY BYPASS GRAFT  01/10/2011    LIMA-LAD, SVG-OM, SVG-PDA. PHYLLIS- D1   • ECHO - CONVERTED  11/03/2011    Echo-EF 65/70%   • ECHO - CONVERTED  09/30/2004    Echo-EF 65%, mild MR, borderline pulm HTN   • ECHO - CONVERTED  07/22/2019    EF 60%. Mild- Mod MR. LA- 4.5 Cm   • ENDOSCOPY     • HYDROCELE EXCISION / REPAIR Left 2014   • OTHER SURGICAL HISTORY  01/09/2011     Carotid US- No sig. stenosis.    • THORACOSCOPY VIDEO ASSISTED WITH LOBECTOMY N/A 7/23/2021    Procedure: THORACOSCOPY VIDEO ASSISTED WITH WEDGE RESECTION;  Surgeon: Rudolph Cm MD;  Location:  [facility];  Service: Cardiothoracic;  Laterality: N/A;       Current Outpatient Medications   Medication Sig Dispense Refill   • aspirin 325 MG tablet Take 325 mg by mouth daily.     • atorvastatin (LIPITOR) 20 MG tablet Take 1 tablet by mouth Every Night. 90 tablet 3   • carvedilol (COREG) 6.25 MG tablet Take 1 tablet by mouth 2 (Two) Times a Day With Meals. 180 tablet 2   • fenofibrate (TRICOR) 145 MG tablet  Take 1 tablet by mouth Daily. 90 tablet 2   • fluticasone (FLONASE) 50 MCG/ACT nasal spray 2 sprays into the nostril(s) as directed by provider As Needed.     • isosorbide mononitrate (IMDUR) 60 MG 24 hr tablet Take 1 tablet by mouth Every Evening. 90 tablet 3   • loratadine (CLARITIN) 10 MG tablet TAKE 1 TABLET EVERY DAY (Patient taking differently: Take 10 mg by mouth Daily.) 90 tablet 2   • methocarbamol (ROBAXIN) 750 MG tablet Take 750 mg by mouth Daily.     • omeprazole (priLOSEC) 20 MG capsule Take 20 mg by mouth Daily.     • Probiotic Product (PROBIOTIC DAILY PO) Take  by mouth Daily.     • ranolazine (RANEXA) 500 MG 12 hr tablet Take 1 tablet by mouth 2 (Two) Times a Day. 180 tablet 2     No current facility-administered medications for this visit.       Chantix [varenicline tartrate] and Keflex [cephalexin]    Past Medical History:   Diagnosis Date   • Anemia     Acute blood loss Anemia   • Arthritis     back   • Back pain     Chronic   • Mireles's esophagus    • Coronary artery disease 2011    NO STENTS; 4 graft CABG; MI before CABG; recent heart cath shows two grafts are blocked;    • Dyslipidemia    • GERD (gastroesophageal reflux disease)    • H pylori ulcer     Positive   • History of COVID-19 02/2022   • History of kidney stones     one (passed)   • Hyperlipidemia    • Hypertension    • IHD (ischemic heart disease)     s/p CABG X 4-V   • Lung mass 02/2020    routine CT scan revealed mass in 2020; monitored regularly; recent CT scan 2021 showed additional abnormalities    • Myocardial infarction (HCC) 2011   • Sleep apnea     wears CPAP   • Thrombocytopenia (HCC)    • Tinnitus    • Vitamin D deficiency        Social History     Socioeconomic History   • Marital status:    • Number of children: 2   Tobacco Use   • Smoking status: Current Every Day Smoker     Packs/day: 1.00     Years: 46.00     Pack years: 46.00     Types: Cigarettes   • Smokeless tobacco: Never Used   Vaping Use   • Vaping Use:  "Never used   Substance and Sexual Activity   • Alcohol use: No   • Drug use: No   • Sexual activity: Defer       Family History   Problem Relation Age of Onset   • Hypertension Mother    • Alzheimer's disease Mother    • Hypertension Father    • Alzheimer's disease Father    • Hypertension Other        Review of Systems   Constitutional: Negative for malaise/fatigue and night sweats.   Cardiovascular: Negative for chest pain, claudication, dyspnea on exertion, irregular heartbeat, leg swelling, near-syncope, orthopnea, palpitations and syncope.   Respiratory: Positive for shortness of breath. Negative for cough and wheezing.    Musculoskeletal: Positive for joint pain and stiffness. Negative for back pain.   Gastrointestinal: Negative for anorexia, heartburn, melena, nausea and vomiting.   Genitourinary: Negative for dysuria, hematuria, hesitancy and nocturia.   Neurological: Negative for dizziness, headaches and light-headedness.   Psychiatric/Behavioral: Negative for depression and memory loss. The patient is not nervous/anxious.            Objective     /82 (BP Location: Left arm, Patient Position: Sitting)   Pulse 62   Ht 167.6 cm (66\")   Wt 93.2 kg (205 lb 6.4 oz)   BMI 33.15 kg/m²     Constitutional:       Appearance: Not in distress.   Eyes:      Pupils: Pupils are equal, round, and reactive to light.   HENT:      Nose: Nose normal.   Pulmonary:      Effort: Pulmonary effort is normal.      Breath sounds: Normal breath sounds.   Cardiovascular:      PMI at left midclavicular line. Normal rate. Regularly irregular rhythm.      Murmurs: There is a systolic murmur.   Abdominal:      Palpations: Abdomen is soft.   Musculoskeletal: Normal range of motion.      Cervical back: Normal range of motion and neck supple. Skin:     General: Skin is warm and dry.   Neurological:      Mental Status: Alert.         Procedures    [unfilled]      CAD- s/p CABG in 2011. Most recent cath in 2020 showed patent " LIMA and PHYLLIS with two occluded vein grafts with collateralization. Most recent CT showed stable surgical changes, he continues to follow with ID and cardiothoracic surgery in regards, repeat scan advised for one year with Toi. Imdur and ranexa continued at same as chest pain denied. ASA Continued as well.      HTN- well controlled, SBP upper normal, patient advised to monitor. Continue with current coreg therapy, limited sodium advised.      Hypercholesterolemia- managed with Lipitor and fenofibrate. Labs managed by your office. Please include FLP with next draw and fax to our office for review. For now, same continued.     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      LLUVIA- on CPAP therapy, reports compliance. Followed by pulmonary.     COPD- he has been unable to quit smoking, and states he is followed by pulmonary.     Refills per request.     BMI noted at 33.15, good cardiac diet advised along with walking regimen.    Tobacco abuse:  Smoking and unable to quit, he does not feel like he can quit.                   Problems Addressed this Visit        Cardiac and Vasculature    CAD s/p CABG X4 (occluded vein grafts)  - Primary (Chronic)    Relevant Medications    carvedilol (COREG) 6.25 MG tablet    isosorbide mononitrate (IMDUR) 60 MG 24 hr tablet    ranolazine (RANEXA) 500 MG 12 hr tablet    Hypercholesteremia (Chronic)    Relevant Medications    atorvastatin (LIPITOR) 20 MG tablet    fenofibrate (TRICOR) 145 MG tablet    Essential hypertension (Chronic)    Relevant Medications    carvedilol (COREG) 6.25 MG tablet    Murmur, cardiac       Pulmonary and Pneumonias    L VATS wedge 07/23/21       Sleep    LLUVIA on CPAP (Chronic)       Tobacco    Smoker (Chronic)       Other    Possible cryptococcus (positive antigen of 1:160)      Other Visit Diagnoses     Obesity (BMI 30.0-34.9)          Diagnoses       Codes Comments    CAD s/p CABG X4  (occluded vein grafts)     -  Primary ICD-10-CM: I25.118  ICD-9-CM: 414.01, 413.9     Coronary artery disease of native artery of native heart with stable angina pectoris (HCC)     ICD-10-CM: I25.118  ICD-9-CM: 414.01, 413.9     Essential hypertension     ICD-10-CM: I10  ICD-9-CM: 401.9     Hypercholesteremia     ICD-10-CM: E78.00  ICD-9-CM: 272.0     Murmur, cardiac     ICD-10-CM: R01.1  ICD-9-CM: 785.2     L VATS wedge 07/23/21     ICD-10-CM: R91.1  ICD-9-CM: 793.11     LLUVIA on CPAP     ICD-10-CM: G47.33, Z99.89  ICD-9-CM: 327.23, V46.8     Possible cryptococcus (positive antigen of 1:160)     ICD-10-CM: B45.9  ICD-9-CM: 117.5     Smoker     ICD-10-CM: F17.200  ICD-9-CM: 305.1     Obesity (BMI 30.0-34.9)     ICD-10-CM: E66.9  ICD-9-CM: 278.00           Sawyer Tilley  reports that he has been smoking cigarettes. He has a 46.00 pack-year smoking history. He has never used smokeless tobacco. Cessation urged, he is not ready to quit at present.             Electronically signed by Katherine Rehman, APRN June 16, 2022 12:33 EDT

## 2022-11-21 DIAGNOSIS — R91.1 LUNG NODULE: Primary | ICD-10-CM

## 2022-12-21 RX ORDER — CARVEDILOL 6.25 MG/1
TABLET ORAL
Qty: 180 TABLET | Refills: 2 | OUTPATIENT
Start: 2022-12-21

## 2022-12-21 RX ORDER — RANOLAZINE 500 MG/1
TABLET, EXTENDED RELEASE ORAL
Qty: 180 TABLET | Refills: 2 | Status: SHIPPED | OUTPATIENT
Start: 2022-12-21

## 2023-02-07 DIAGNOSIS — R91.1 LUNG NODULE: ICD-10-CM

## 2023-02-09 ENCOUNTER — OFFICE VISIT (OUTPATIENT)
Dept: CARDIAC SURGERY | Facility: CLINIC | Age: 63
End: 2023-02-09
Payer: MEDICARE

## 2023-02-09 VITALS
TEMPERATURE: 97.8 F | OXYGEN SATURATION: 98 % | SYSTOLIC BLOOD PRESSURE: 132 MMHG | DIASTOLIC BLOOD PRESSURE: 65 MMHG | HEART RATE: 54 BPM | WEIGHT: 210 LBS | BODY MASS INDEX: 33.89 KG/M2

## 2023-02-09 DIAGNOSIS — E78.00 HYPERCHOLESTEREMIA: Chronic | ICD-10-CM

## 2023-02-09 DIAGNOSIS — B45.9 CRYPTOCOCCUS: ICD-10-CM

## 2023-02-09 DIAGNOSIS — R91.1 LUNG NODULE: ICD-10-CM

## 2023-02-09 DIAGNOSIS — F17.200 SMOKER: ICD-10-CM

## 2023-02-09 DIAGNOSIS — I25.118 CORONARY ARTERY DISEASE OF NATIVE ARTERY OF NATIVE HEART WITH STABLE ANGINA PECTORIS: Primary | Chronic | ICD-10-CM

## 2023-02-09 DIAGNOSIS — I10 ESSENTIAL HYPERTENSION: Chronic | ICD-10-CM

## 2023-02-09 DIAGNOSIS — R91.1 NODULE OF UPPER LOBE OF LEFT LUNG: ICD-10-CM

## 2023-02-09 PROCEDURE — 99213 OFFICE O/P EST LOW 20 MIN: CPT | Performed by: THORACIC SURGERY (CARDIOTHORACIC VASCULAR SURGERY)

## 2023-02-09 RX ORDER — MONTELUKAST SODIUM 10 MG/1
TABLET ORAL
COMMUNITY
Start: 2022-12-13

## 2023-02-09 RX ORDER — ALBUTEROL SULFATE 90 UG/1
AEROSOL, METERED RESPIRATORY (INHALATION)
COMMUNITY
Start: 2022-12-13

## 2023-02-09 RX ORDER — LATANOPROST 50 UG/ML
SOLUTION/ DROPS OPHTHALMIC
COMMUNITY
Start: 2022-12-01

## 2023-02-09 RX ORDER — ALBUTEROL SULFATE 2.5 MG/3ML
SOLUTION RESPIRATORY (INHALATION)
COMMUNITY
Start: 2023-02-01

## 2023-02-09 NOTE — PROGRESS NOTES
Subjective   Patient ID: Sawyer Tilley is a 62 y.o. male is here today for follow-up.    History of Present Illness   62-year-old  male now 18 months status post left thoracotomy and wedge resection of pulmonary nodules in the left upper lobe and left lower lobe.  The patient has had a benign postoperative course and is currently doing well.  He had COVID approximately 8 months ago but has had a full complete recovery.  Unfortunately he continues to smoke.  He has 2 pulmonary nodules in the right lung that we have followed.  These lesions were 6 mm and 7 mm and slightly cavitary.  Review of his most recent CT scan done in [place] with comparison to previous CT scans done here reveals the nodules to be unchanged.  CT scan shows no new lesions.  The patient has hypertension and hyperlipidemia which he believes are well controlled.  He has previously had coronary artery bypass surgery and currently does not have anginal quality chest pain or significant shortness of breath.  He does have shortness of breath with extreme activity.  He states that he has been able to cut firewood and do standard chores around the house.  He has not had fever chills or night sweats.  He has completed a 1 year course of antibiotic treatment for cryptococcus and is no longer being followed by infectious disease.    The following portions of the patient's history were reviewed and updated as appropriate: allergies, current medications, past family history, past medical history, past social history, past surgical history and problem list.    Review of Systems   Constitutional: Negative for chills, fever, malaise/fatigue, night sweats and weight loss.   HENT: Negative for hearing loss, odynophagia and sore throat.    Eyes: Negative for blurred vision, photophobia, vision loss in left eye, vision loss in right eye and visual disturbance.   Cardiovascular: Negative for chest pain, dyspnea on exertion, leg swelling, orthopnea and  palpitations.   Respiratory: Negative for cough, hemoptysis, shortness of breath and wheezing.    Endocrine: Negative for cold intolerance, heat intolerance, polydipsia, polyphagia and polyuria.   Hematologic/Lymphatic: Does not bruise/bleed easily.   Skin: Negative for itching and rash.   Musculoskeletal: Negative for joint pain, joint swelling and myalgias.   Gastrointestinal: Negative for abdominal pain, constipation, diarrhea, hematemesis, hematochezia, melena, nausea and vomiting.   Genitourinary: Negative for dysuria, frequency and hematuria.   Neurological: Negative for focal weakness, headaches, numbness and seizures.   Psychiatric/Behavioral: Negative for altered mental status and suicidal ideas. The patient is not nervous/anxious.    All other systems reviewed and are negative.       Objective   Physical Exam  Vitals and nursing note reviewed.   Constitutional:       General: He is not in acute distress.     Appearance: Normal appearance. He is normal weight.   HENT:      Head: Normocephalic and atraumatic.      Right Ear: External ear normal.      Left Ear: External ear normal.      Nose: Nose normal.      Mouth/Throat:      Mouth: Mucous membranes are moist.   Eyes:      Extraocular Movements: Extraocular movements intact.      Pupils: Pupils are equal, round, and reactive to light.   Neck:      Vascular: No carotid bruit.   Cardiovascular:      Rate and Rhythm: Normal rate and regular rhythm.      Pulses: Normal pulses.      Heart sounds: Normal heart sounds. No murmur heard.  Pulmonary:      Effort: Pulmonary effort is normal. No respiratory distress.      Breath sounds: No wheezing, rhonchi or rales.   Abdominal:      General: Abdomen is flat. Bowel sounds are normal.      Palpations: Abdomen is soft. There is no mass.      Tenderness: There is no abdominal tenderness. There is no guarding.   Musculoskeletal:         General: No swelling or tenderness.      Cervical back: Normal range of motion. No  rigidity. No muscular tenderness.      Right lower leg: No edema.      Left lower leg: No edema.      Comments: Left breast 6 incisions well-healed no erythema no drainage   Lymphadenopathy:      Cervical: No cervical adenopathy.   Skin:     General: Skin is warm and dry.      Capillary Refill: Capillary refill takes less than 2 seconds.      Coloration: Skin is not jaundiced.      Findings: No erythema.   Neurological:      General: No focal deficit present.      Mental Status: He is alert and oriented to person, place, and time. Mental status is at baseline.   Psychiatric:         Mood and Affect: Mood normal.         Behavior: Behavior normal.         Thought Content: Thought content normal.         Judgment: Judgment normal.         Assessment & Plan   Independent Review of Radiographic Studies:  CT scan of the chest done in [place] last month reviewed.  Images and report reviewed and compared in detail to previous CT scans from 1 year ago.  Pulmonary nodules are stable and there are no new findings.  My review coincides with the radiologist report.      Diagnoses and all orders for this visit:    1. CAD s/p CABG X4 (occluded vein grafts)  (Primary)    2. L VATS wedge 07/23/21    3. Possible cryptococcus (positive antigen of 1:160)    4. Hypercholesteremia    5. Essential hypertension    6. Smoker    7. Lung nodule      Plan: The patient will have 1 additional chest CT scan done in 1 year with a telephone visit to follow        Electronically signed by Rudolph Cm MD, 02/09/23, 11:42 AM EST.

## 2023-02-09 NOTE — PROGRESS NOTES
02/09/2023  Patient Information  Sawyer Tilley                                                                                          [id number] [place] [id number]   1960  'PCP/Referring Physician'  Germán Casey MD  No ref. provider found  Chief Complaint   Patient presents with   • Lung Nodule     1 year follow-up with CT chest.        CC: I am doing great I am here for checkup on the spots on my lung    History of Present Illness: 62-year-old  male now 18 months status post left thoracotomy with wedge resection of pulmonary nodules in both the left upper lobe and left lower lobe (pathology consistent with cryptococcus).  The patient was subsequently treated with antibiotics for 1 year.  He remains asymptomatic.  He denies fever, chills, and night sweats.  Unfortunately he continues to smoke and has a chronic smoker's cough.  He has had only minimal amounts of sputum production and has not had hemoptysis.  He denies weight loss.  He does not have shortness of breath although he had COVID approximately 8 months ago and states that he had significant shortness of breath at that point in time.  His symptoms have subsequently completely cleared.  He has 2 pulmonary nodules in the right lung noted back on the previous CT scans.  He has a recent CT scan of the chest for follow-up of these pulmonary nodules.  I have reviewed the CT scan images and have reviewed the report.  The 2 nodules in question are 6 and 7 mm in diameter and are essentially unchanged from 1 year ago.  Other findings are stable and unchanged.      Patient Active Problem List   Diagnosis   • CAD s/p CABG X4 (occluded vein grafts)    • Hypercholesteremia   • Essential hypertension   • Murmur, cardiac   • L VATS wedge 07/23/21   • Smoker   • Class 1 obesity in adult   • LLUVIA on CPAP   • IGT (HA1C 5.8)    • Possible cryptococcus (positive antigen of 1:160)     Past Medical History:   Diagnosis Date   • Anemia     Acute blood  loss Anemia   • Arthritis     back   • Back pain     Chronic   • Mireles's esophagus    • Coronary artery disease 2011    NO STENTS; 4 graft CABG; MI before CABG; recent heart cath shows two grafts are blocked;    • Dyslipidemia    • GERD (gastroesophageal reflux disease)    • H pylori ulcer     Positive   • History of COVID-19 02/2022   • History of kidney stones     one (passed)   • Hyperlipidemia    • Hypertension    • IHD (ischemic heart disease)     s/p CABG X 4-V   • Lung mass 02/2020    routine CT scan revealed mass in 2020; monitored regularly; recent CT scan 2021 showed additional abnormalities    • Myocardial infarction (HCC) 2011   • Sleep apnea     wears CPAP   • Thrombocytopenia (HCC)    • Tinnitus    • Vitamin D deficiency      Past Surgical History:   Procedure Laterality Date   • BACK SURGERY  02/2010   • BRONCHOSCOPY N/A 07/09/2021    Procedure: BRONCHOSCOPY WITH ENDOBRONCHIAL ULTRASOUND;  Surgeon: Rudolph Cm MD;  Location:  [facility];  Service: Cardiothoracic;  Laterality: N/A;  balloon intact    • BRONCHOSCOPY N/A 07/23/2021    Procedure: BRONCHOSCOPY;  Surgeon: Rudolph Cm MD;  Location:  [facility];  Service: Cardiothoracic;  Laterality: N/A;   • CARDIAC CATHETERIZATION  01/08/2011    Cath-60%LAD, 90%D1, 75%LCX, 80%OM. 100%RCA.   • CARDIAC CATHETERIZATION  03/03/2020    Patent LIMA -LAD & PHYLLIS - D1. 100% SVG -OM & SVG-PDA.   • CARDIOVASCULAR STRESS TEST  11/03/2011    Stress-4min, 41sec, 84%THR, 160/86. Inferior Ischemia   • CARDIOVASCULAR STRESS TEST  09/30/2014    L.[place]-no ischemia, small fixed inferior infarct   • CARDIOVASCULAR STRESS TEST  07/22/2019    L.Cardiolite- EF 57%. Inferolateral Infarct with periinfarct Ischemia.   • CATARACT EXTRACTION Bilateral    • COLONOSCOPY     • CORONARY ARTERY BYPASS GRAFT  01/10/2011    LIMA-LAD, SVG-OM, SVG-PDA. PHYLLIS- D1   • ECHO - CONVERTED  11/03/2011    Echo-EF 65/70%   • ECHO - CONVERTED  09/30/2004    Echo-EF 65%, mild MR, borderline  pulm HTN   • ECHO - CONVERTED  07/22/2019    EF 60%. Mild- Mod MRAnahi LA- 4.5 Cm   • ENDOSCOPY     • HYDROCELE EXCISION / REPAIR Left 2014   • HYDROCELE EXCISION / REPAIR Right 11/2022   • OTHER SURGICAL HISTORY  01/09/2011     Carotid US- No sig. stenosis.    • THORACOSCOPY VIDEO ASSISTED WITH LOBECTOMY N/A 07/23/2021    Procedure: THORACOSCOPY VIDEO ASSISTED WITH WEDGE RESECTION;  Surgeon: Rudolph Cm MD;  Location: [facility];  Service: Cardiothoracic;  Laterality: N/A;       Current Outpatient Medications:   •  albuterol (PROVENTIL) (2.5 MG/3ML) 0.083% nebulizer solution, , Disp: , Rfl:   •  albuterol sulfate  (90 Base) MCG/ACT inhaler, , Disp: , Rfl:   •  aspirin 325 MG tablet, Take 325 mg by mouth daily., Disp: , Rfl:   •  atorvastatin (LIPITOR) 20 MG tablet, Take 1 tablet by mouth Every Night., Disp: 90 tablet, Rfl: 3  •  carvedilol (COREG) 6.25 MG tablet, Take 1 tablet by mouth 2 (Two) Times a Day With Meals., Disp: 180 tablet, Rfl: 2  •  fenofibrate (TRICOR) 145 MG tablet, Take 1 tablet by mouth Daily., Disp: 90 tablet, Rfl: 2  •  fluticasone (FLONASE) 50 MCG/ACT nasal spray, 2 sprays into the nostril(s) as directed by provider As Needed., Disp: , Rfl:   •  isosorbide mononitrate (IMDUR) 60 MG 24 hr tablet, Take 1 tablet by mouth Every Evening., Disp: 90 tablet, Rfl: 3  •  latanoprost (XALATAN) 0.005 % ophthalmic solution, , Disp: , Rfl:   •  methocarbamol (ROBAXIN) 750 MG tablet, Take 750 mg by mouth Daily., Disp: , Rfl:   •  montelukast (SINGULAIR) 10 MG tablet, , Disp: , Rfl:   •  omeprazole (priLOSEC) 20 MG capsule, Take 20 mg by mouth Daily., Disp: , Rfl:   •  Probiotic Product (PROBIOTIC DAILY PO), Take  by mouth Daily., Disp: , Rfl:   •  ranolazine (RANEXA) 500 MG 12 hr tablet, TAKE 1 TABLET TWICE DAILY (NEED MD APPOINTMENT FOR REFILLS), Disp: 180 tablet, Rfl: 2  •  loratadine (CLARITIN) 10 MG tablet, TAKE 1 TABLET EVERY DAY (Patient not taking: Reported on 2/9/2023), Disp: 90 tablet, Rfl:  2  Allergies   Allergen Reactions   • Chantix [Varenicline Tartrate] Delirium     dreams   • Keflex [Cephalexin] Itching     Social History     Socioeconomic History   • Marital status:    • Number of children: 2   Tobacco Use   • Smoking status: Every Day     Packs/day: 1.00     Years: 46.00     Pack years: 46.00     Types: Cigarettes   • Smokeless tobacco: Never   Vaping Use   • Vaping Use: Never used   Substance and Sexual Activity   • Alcohol use: No   • Drug use: No   • Sexual activity: Defer     Family History   Problem Relation Age of Onset   • Hypertension Mother    • Alzheimer's disease Mother    • Hypertension Father    • Alzheimer's disease Father    • Hypertension Other        ROS, past medical history, surgical history, family history, social history and vitals  reviewed by me and corrected as needed.         Vitals:    02/09/23 1047   BP: 132/65   Pulse: 54   Temp: 97.8 °F (36.6 °C)   SpO2: 98%   Weight: 95.3 kg (210 lb)        Physical Exam  Vitals and nursing note reviewed.   Constitutional:       General: He is not in acute distress.     Appearance: Normal appearance. He is obese.   HENT:      Head: Normocephalic and atraumatic.      Right Ear: External ear normal.      Left Ear: External ear normal.      Nose: Nose normal.      Mouth/Throat:      Mouth: Mucous membranes are moist.   Eyes:      Extraocular Movements: Extraocular movements intact.      Pupils: Pupils are equal, round, and reactive to light.   Neck:      Vascular: No carotid bruit.   Cardiovascular:      Rate and Rhythm: Normal rate and regular rhythm.      Pulses: Normal pulses.      Heart sounds: Normal heart sounds. No murmur heard.  Pulmonary:      Effort: Pulmonary effort is normal. No respiratory distress.      Breath sounds: No wheezing, rhonchi or rales.   Abdominal:      General: Abdomen is flat. Bowel sounds are normal.      Palpations: Abdomen is soft. There is no mass.      Tenderness: There is no abdominal  tenderness. There is no guarding.   Musculoskeletal:         General: No swelling or tenderness.      Cervical back: Normal range of motion. No rigidity. No muscular tenderness.      Right lower leg: No edema.      Left lower leg: No edema.      Comments: Left thoracotomy incision well-healed.  No erythema no drainage.  Mild tenderness anterior chest over the sixth and seventh interspaces.   Lymphadenopathy:      Cervical: No cervical adenopathy.   Skin:     General: Skin is warm and dry.      Capillary Refill: Capillary refill takes less than 2 seconds.      Coloration: Skin is not jaundiced.      Findings: No erythema.   Neurological:      General: No focal deficit present.      Mental Status: He is alert and oriented to person, place, and time. Mental status is at baseline.   Psychiatric:         Mood and Affect: Mood normal.         Behavior: Behavior normal.         Thought Content: Thought content normal.         Judgment: Judgment normal.         Labs:    Imaging:    Assessment:    Plan:

## 2023-03-06 RX ORDER — FENOFIBRATE 145 MG/1
TABLET, COATED ORAL
Qty: 90 TABLET | Refills: 2 | Status: SHIPPED | OUTPATIENT
Start: 2023-03-06

## 2023-04-17 ENCOUNTER — OFFICE VISIT (OUTPATIENT)
Dept: CARDIOLOGY | Facility: CLINIC | Age: 63
End: 2023-04-17
Payer: MEDICARE

## 2023-04-17 VITALS
BODY MASS INDEX: 33.68 KG/M2 | DIASTOLIC BLOOD PRESSURE: 84 MMHG | HEIGHT: 66 IN | SYSTOLIC BLOOD PRESSURE: 140 MMHG | WEIGHT: 209.6 LBS | HEART RATE: 56 BPM

## 2023-04-17 DIAGNOSIS — I25.10 CORONARY ARTERY DISEASE INVOLVING NATIVE CORONARY ARTERY OF NATIVE HEART WITHOUT ANGINA PECTORIS: ICD-10-CM

## 2023-04-17 DIAGNOSIS — E78.00 HYPERCHOLESTEREMIA: ICD-10-CM

## 2023-04-17 DIAGNOSIS — Z72.0 TOBACCO ABUSE: Primary | ICD-10-CM

## 2023-04-17 DIAGNOSIS — R00.1 BRADYCARDIA, DRUG INDUCED: ICD-10-CM

## 2023-04-17 DIAGNOSIS — E66.9 OBESITY (BMI 30.0-34.9): ICD-10-CM

## 2023-04-17 DIAGNOSIS — T50.905A BRADYCARDIA, DRUG INDUCED: ICD-10-CM

## 2023-04-17 DIAGNOSIS — F17.200 SMOKER: Chronic | ICD-10-CM

## 2023-04-17 DIAGNOSIS — Z99.89 OSA ON CPAP: Chronic | ICD-10-CM

## 2023-04-17 DIAGNOSIS — I10 PRIMARY HYPERTENSION: ICD-10-CM

## 2023-04-17 DIAGNOSIS — G47.33 OSA ON CPAP: Chronic | ICD-10-CM

## 2023-04-17 DIAGNOSIS — I25.118 CORONARY ARTERY DISEASE OF NATIVE ARTERY OF NATIVE HEART WITH STABLE ANGINA PECTORIS: ICD-10-CM

## 2023-04-17 RX ORDER — FENOFIBRATE 145 MG/1
145 TABLET, COATED ORAL DAILY
Qty: 90 TABLET | Refills: 2 | Status: SHIPPED | OUTPATIENT
Start: 2023-04-17

## 2023-04-17 RX ORDER — RANOLAZINE 500 MG/1
500 TABLET, EXTENDED RELEASE ORAL 2 TIMES DAILY
Qty: 180 TABLET | Refills: 2 | Status: SHIPPED | OUTPATIENT
Start: 2023-04-17

## 2023-04-17 RX ORDER — ISOSORBIDE MONONITRATE 60 MG/1
60 TABLET, EXTENDED RELEASE ORAL EVERY EVENING
Qty: 90 TABLET | Refills: 3 | Status: SHIPPED | OUTPATIENT
Start: 2023-04-17

## 2023-04-17 RX ORDER — CARVEDILOL 6.25 MG/1
6.25 TABLET ORAL 2 TIMES DAILY WITH MEALS
Qty: 180 TABLET | Refills: 2 | Status: SHIPPED | OUTPATIENT
Start: 2023-04-17

## 2023-04-17 RX ORDER — ATORVASTATIN CALCIUM 20 MG/1
20 TABLET, FILM COATED ORAL NIGHTLY
Qty: 90 TABLET | Refills: 3 | Status: SHIPPED | OUTPATIENT
Start: 2023-04-17

## 2023-04-17 NOTE — PROGRESS NOTES
Chief Complaint   Patient presents with   • Follow-up     Pt is here for cardiac follow up.  Pt denies CP, SOB, dizziness or palpitations.  Pt does not take a daily ASA.     • Med Refill     Pt request 90 day refills to be sent to [facility].  Medications were verified by med list.      • Lab Work     Pt states their last labs were last year with his PCP.         Cardiac Complaints  none      Subjective   Sawyer Tilley is a 62 y.o. male with HTN, hyperlipidemia, and IHD s/p bypass in 2011. Lexiscan on 7/22/2019 showed inferior lateral wall infarct with jhoana-infarct ischemia. Medical management was tried with Imdur increased. He had more chest pain, on 3/3/20 he underwent cardiac cath showing patent LIMA to LAD and PHYLLIS to D1, but both OM and PDA vein grafts occluded. He appeared to have collateralization.       He apparently had enlargement of lung nodule referred back to Dr. Cm for evaluation. CT and PET Imaging appeared to show primary Lung CA with local metastasis. He underwent left VATS with small thoracotomy and wedge resection of left lung lesions. Pathology was consistent with granulomatous infection.  It was felt by infectious disease that this was most likely histoplasmosis. He is now following with ID specialist, Dr. Richard Koch on long-term antifungal/fluconazole. FU with Dr. Cm in January 2022, current plan of care continued. CT of chest stable, RLL nodule stable, repeat scan advised in 1 year, continued to follow with ID and remain on diflucan until he saw ID was the plan.    He comes today for follow up and denies any SOA, CP, palpitations, dizziness, or syncope. He remains followed by CT surgery and admits most recent CT of the chest showed lung nodules stable, current plan continued, recheck in one year advised. Patient reports remaining CPAP compliant. Labs are followed by PCP, done sometime this year, he can not recall when. Unfortunately, still smoking despite concerns. Refills  requested.            Cardiac History  Past Surgical History:   Procedure Laterality Date   • BACK SURGERY  02/2010   • BRONCHOSCOPY N/A 07/09/2021    Procedure: BRONCHOSCOPY WITH ENDOBRONCHIAL ULTRASOUND;  Surgeon: Rudolph Cm MD;  Location: [facility] ENDOSCOPY;  Service: Cardiothoracic;  Laterality: N/A;  balloon intact    • BRONCHOSCOPY N/A 07/23/2021    Procedure: BRONCHOSCOPY;  Surgeon: Rudolph Cm MD;  Location:  [facility] OR;  Service: Cardiothoracic;  Laterality: N/A;   • CARDIAC CATHETERIZATION  01/08/2011    Cath-60%LAD, 90%D1, 75%LCX, 80%OM. 100%RCA.   • CARDIAC CATHETERIZATION  03/03/2020    Patent LIMA -LAD & PHYLLIS - D1. 100% SVG -OM & SVG-PDA.   • CARDIOVASCULAR STRESS TEST  11/03/2011    Stress-4min, 41sec, 84%THR, 160/86. Inferior Ischemia   • CARDIOVASCULAR STRESS TEST  09/30/2014    L.Myview-no ischemia, small fixed inferior infarct   • CARDIOVASCULAR STRESS TEST  07/22/2019    L.Cardiolite- EF 57%. Inferolateral Infarct with periinfarct Ischemia.   • CATARACT EXTRACTION Bilateral    • COLONOSCOPY     • CORONARY ARTERY BYPASS GRAFT  01/10/2011    LIMA-LAD, SVG-OM, SVG-PDA. PHYLLIS- D1   • ECHO - CONVERTED  11/03/2011    Echo-EF 65/70%   • ECHO - CONVERTED  09/30/2004    Echo-EF 65%, mild MR, borderline pulm HTN   • ECHO - CONVERTED  07/22/2019    EF 60%. Mild- Mod MR. LA- 4.5 Cm   • ENDOSCOPY     • HYDROCELE EXCISION / REPAIR Left 2014   • HYDROCELE EXCISION / REPAIR Right 11/2022   • OTHER SURGICAL HISTORY  01/09/2011     Carotid US- No sig. stenosis.    • THORACOSCOPY VIDEO ASSISTED WITH LOBECTOMY N/A 07/23/2021    Procedure: THORACOSCOPY VIDEO ASSISTED WITH WEDGE RESECTION;  Surgeon: Rudolph Cm MD;  Location: [facility] OR;  Service: Cardiothoracic;  Laterality: N/A;       Current Outpatient Medications   Medication Sig Dispense Refill   • albuterol (PROVENTIL) (2.5 MG/3ML) 0.083% nebulizer solution      • albuterol sulfate  (90 Base) MCG/ACT inhaler      • aspirin 325 MG tablet Take 1  tablet by mouth Daily.     • atorvastatin (LIPITOR) 20 MG tablet Take 1 tablet by mouth Every Night. 90 tablet 3   • carvedilol (COREG) 6.25 MG tablet Take 1 tablet by mouth 2 (Two) Times a Day With Meals. 180 tablet 2   • fenofibrate (TRICOR) 145 MG tablet Take 1 tablet by mouth Daily. 90 tablet 2   • isosorbide mononitrate (IMDUR) 60 MG 24 hr tablet Take 1 tablet by mouth Every Evening. 90 tablet 3   • latanoprost (XALATAN) 0.005 % ophthalmic solution      • methocarbamol (ROBAXIN) 750 MG tablet Take 1 tablet by mouth Daily.     • montelukast (SINGULAIR) 10 MG tablet      • omeprazole (priLOSEC) 20 MG capsule Take 1 capsule by mouth Daily.     • Probiotic Product (PROBIOTIC DAILY PO) Take  by mouth Daily.     • ranolazine (RANEXA) 500 MG 12 hr tablet Take 1 tablet by mouth 2 (Two) Times a Day. 180 tablet 2     No current facility-administered medications for this visit.       Chantix [varenicline tartrate] and Keflex [cephalexin]    Past Medical History:   Diagnosis Date   • Anemia     Acute blood loss Anemia   • Arthritis     back   • Back pain     Chronic   • Mireles's esophagus    • Coronary artery disease 2011    NO STENTS; 4 graft CABG; MI before CABG; recent heart cath shows two grafts are blocked;    • Dyslipidemia    • GERD (gastroesophageal reflux disease)    • H pylori ulcer     Positive   • History of COVID-19 02/2022   • History of kidney stones     one (passed)   • Hyperlipidemia    • Hypertension    • IHD (ischemic heart disease)     s/p CABG X 4-V   • Lung mass 02/2020    routine CT scan revealed mass in 2020; monitored regularly; recent CT scan 2021 showed additional abnormalities    • Myocardial infarction 2011   • Sleep apnea     wears CPAP   • Thrombocytopenia    • Tinnitus    • Vitamin D deficiency        Social History     Socioeconomic History   • Marital status:    • Number of children: 2   Tobacco Use   • Smoking status: Every Day     Packs/day: 1.00     Years: 46.00     Pack years:  "46.00     Types: Cigarettes   • Smokeless tobacco: Never   Vaping Use   • Vaping Use: Never used   Substance and Sexual Activity   • Alcohol use: No   • Drug use: No   • Sexual activity: Defer       Family History   Problem Relation Age of Onset   • Hypertension Mother    • Alzheimer's disease Mother    • Hypertension Father    • Alzheimer's disease Father    • Hypertension Other        Review of Systems   Constitutional: Negative for malaise/fatigue and night sweats.   Cardiovascular: Negative for chest pain, claudication, dyspnea on exertion, leg swelling, palpitations and syncope.   Respiratory: Negative for cough, shortness of breath and wheezing.    Musculoskeletal: Negative for back pain, joint pain, joint swelling and stiffness.   Gastrointestinal: Negative for anorexia, heartburn, nausea and vomiting.   Genitourinary: Negative for dysuria, hematuria, hesitancy and nocturia.   Neurological: Negative for dizziness, headaches and light-headedness.   Psychiatric/Behavioral: Negative for depression and memory loss. The patient is not nervous/anxious.            Objective     /84 (BP Location: Left arm, Patient Position: Sitting)   Pulse 56   Ht 167.6 cm (66\")   Wt 95.1 kg (209 lb 9.6 oz)   BMI 33.83 kg/m²     Constitutional:       Appearance: Not in distress.   Eyes:      Pupils: Pupils are equal, round, and reactive to light.   HENT:      Nose: Nose normal.   Pulmonary:      Effort: Pulmonary effort is normal.      Breath sounds: Normal breath sounds.   Cardiovascular:      PMI at left midclavicular line. Bradycardia present. Regular rhythm.      Murmurs: There is a systolic murmur.   Abdominal:      Palpations: Abdomen is soft.   Musculoskeletal: Normal range of motion.      Cervical back: Normal range of motion and neck supple. Skin:     General: Skin is warm and dry.   Neurological:      Mental Status: Alert.         Procedures         Diagnoses and all orders for this visit:    1. Tobacco abuse " (Primary)  -     [place] aaa screen limited; Future    2. Coronary artery disease involving native coronary artery of native heart without angina pectoris  -     [place] aaa screen [place]; Future    3. Primary hypertension  -     [place] aaa screen [place]; Future    4. Bradycardia, drug induced    5. Hypercholesteremia  -     atorvastatin (LIPITOR) 20 MG tablet; Take 1 tablet by mouth Every Night.  Dispense: 90 tablet; Refill: 3    6. Coronary artery disease of native artery of native heart with stable angina pectoris  -     isosorbide mononitrate (IMDUR) 60 MG 24 hr tablet; Take 1 tablet by mouth Every Evening.  Dispense: 90 tablet; Refill: 3    7. Smoker    8. LLUVIA on CPAP    9. Obesity (BMI 30.0-34.9)    Other orders  -     carvedilol (COREG) 6.25 MG tablet; Take 1 tablet by mouth 2 (Two) Times a Day With Meals.  Dispense: 180 tablet; Refill: 2  -     fenofibrate (TRICOR) 145 MG tablet; Take 1 tablet by mouth Daily.  Dispense: 90 tablet; Refill: 2  -     ranolazine (RANEXA) 500 MG 12 hr tablet; Take 1 tablet by mouth 2 (Two) Times a Day.  Dispense: 180 tablet; Refill: 2             CAD- s/p CABG in 2011. Most recent cath in 2020 showed patent LIMA and PHYLLIS with two occluded vein grafts with collateralization. Most recent CT showed stable surgical changes, and stable lung nodules, followed by cardiothoracic surgery in regards, repeat scan advised for one year with Toi. Imdur and ranexa continued at same as chest pain denied. ASA continued as well. No repeat workup recommended as status stable.     HTN- stable, SBP upper normal, patient advised to monitor. Continue with current coreg therapy, limited sodium advised.      Hypercholesterolemia- managed with Lipitor and fenofibrate. Labs managed by your office. Please include FLP with next draw and fax to our office for review. For now, same continued. Limited carb diet urged.      Abnormal CT scan: Moderate atherosclerosis of aorta noted. Will advise on screening US of AAA for  history of CAD, HTN, and tobacco abuse. More recommendations to follow.    LLUVIA- on CPAP therapy, reports compliance. Followed by pulmonary.     COPD- he has been unable to quit smoking, and states he is followed by pulmonary.      Refills per request.      BMI noted at 33.83, good cardiac diet advised along with walking regimen.     Tobacco abuse:  Smoking and unable to quit despite concerns.                  Problems Addressed this Visit        Cardiac and Vasculature    CAD s/p CABG X4 (occluded vein grafts)  (Chronic)    Relevant Medications    carvedilol (COREG) 6.25 MG tablet    isosorbide mononitrate (IMDUR) 60 MG 24 hr tablet    ranolazine (RANEXA) 500 MG 12 hr tablet    Hypercholesteremia (Chronic)    Relevant Medications    atorvastatin (LIPITOR) 20 MG tablet    fenofibrate (TRICOR) 145 MG tablet       Sleep    LLUVIA on CPAP (Chronic)       Tobacco    Smoker (Chronic)   Other Visit Diagnoses     Tobacco abuse    -  Primary    Relevant Orders    [place] aaa screen limited    Coronary artery disease involving native coronary artery of native heart without angina pectoris        Relevant Medications    carvedilol (COREG) 6.25 MG tablet    isosorbide mononitrate (IMDUR) 60 MG 24 hr tablet    ranolazine (RANEXA) 500 MG 12 hr tablet    Other Relevant Orders    [place] aaa screen limited    Primary hypertension        Relevant Medications    carvedilol (COREG) 6.25 MG tablet    Other Relevant Orders    [place] aaa screen limited    Bradycardia, drug induced        Relevant Medications    carvedilol (COREG) 6.25 MG tablet    isosorbide mononitrate (IMDUR) 60 MG 24 hr tablet    ranolazine (RANEXA) 500 MG 12 hr tablet    Obesity (BMI 30.0-34.9)          Diagnoses       Codes Comments    Tobacco abuse    -  Primary ICD-10-CM: Z72.0  ICD-9-CM: 305.1     Coronary artery disease involving native coronary artery of native heart without angina pectoris     ICD-10-CM: I25.10  ICD-9-CM: 414.01     Primary hypertension     ICD-10-CM:  I10  ICD-9-CM: 401.9     Bradycardia, drug induced     ICD-10-CM: R00.1, T50.905A  ICD-9-CM: 427.89, E947.9     Hypercholesteremia     ICD-10-CM: E78.00  ICD-9-CM: 272.0     Coronary artery disease of native artery of native heart with stable angina pectoris     ICD-10-CM: I25.118  ICD-9-CM: 414.01, 413.9     Smoker     ICD-10-CM: F17.200  ICD-9-CM: 305.1     LLUVIA on CPAP     ICD-10-CM: G47.33, Z99.89  ICD-9-CM: 327.23, V46.8     Obesity (BMI 30.0-34.9)     ICD-10-CM: E66.9  ICD-9-CM: 278.00                 Electronically signed by Katherine Rehman, APRN April 17, 2023 15:57 EDT

## 2023-05-10 RX ORDER — CARVEDILOL 6.25 MG/1
6.25 TABLET ORAL 2 TIMES DAILY WITH MEALS
Qty: 180 TABLET | Refills: 2 | Status: SHIPPED | OUTPATIENT
Start: 2023-05-10

## 2023-05-10 RX ORDER — RANOLAZINE 500 MG/1
TABLET, EXTENDED RELEASE ORAL
Qty: 180 TABLET | Refills: 2 | Status: SHIPPED | OUTPATIENT
Start: 2023-05-10

## 2023-08-09 ENCOUNTER — TELEPHONE (OUTPATIENT)
Dept: CARDIOLOGY | Facility: CLINIC | Age: 63
End: 2023-08-09
Payer: MEDICARE

## 2023-08-09 NOTE — TELEPHONE ENCOUNTER
Received fax from Dr. Jean Paul Crews for cardiac clearance for patient to have a cholecystectomy. Patient is on aspirin, unclear if needing to hold. According to our records, I do not see where patient has had any stenting. Patient had  CABG on 01/10/11.          Fax [phone]

## 2023-12-11 RX ORDER — FENOFIBRATE 145 MG/1
145 TABLET, COATED ORAL DAILY
Qty: 90 TABLET | Refills: 3 | Status: SHIPPED | OUTPATIENT
Start: 2023-12-11

## 2024-01-09 DIAGNOSIS — R91.1 LUNG NODULE: Primary | ICD-10-CM

## 2024-02-22 DIAGNOSIS — E78.00 HYPERCHOLESTEREMIA: ICD-10-CM

## 2024-02-22 RX ORDER — ATORVASTATIN CALCIUM 20 MG/1
20 TABLET, FILM COATED ORAL NIGHTLY
Qty: 90 TABLET | Refills: 3 | OUTPATIENT
Start: 2024-02-22

## 2024-04-10 ENCOUNTER — TELEPHONE (OUTPATIENT)
Dept: CARDIOLOGY | Facility: CLINIC | Age: 64
End: 2024-04-10
Payer: MEDICARE

## 2024-04-10 NOTE — TELEPHONE ENCOUNTER
Caller: Sawyer Tilley    Relationship to patient: Self    Best call back number: [phone]    Chief complaint:     Type of visit: FOLLOW UP    Requested date: ASAP         Additional notes:PATIENT IS CALLING TO SCHEDULE FOLLOW UP. PATIENT LAST IN OFFICE ON 4-17-23. HUB HAS NO SCHEDULING TIME FRAME. PLEASE REACH OUT TO PATIENT TO SCHEDULE.

## 2024-07-15 ENCOUNTER — OFFICE VISIT (OUTPATIENT)
Dept: CARDIOLOGY | Facility: CLINIC | Age: 64
End: 2024-07-15
Payer: MEDICARE

## 2024-07-15 VITALS
HEART RATE: 60 BPM | HEIGHT: 66 IN | SYSTOLIC BLOOD PRESSURE: 150 MMHG | DIASTOLIC BLOOD PRESSURE: 80 MMHG | BODY MASS INDEX: 33.17 KG/M2 | WEIGHT: 206.4 LBS

## 2024-07-15 DIAGNOSIS — F17.200 SMOKER: Chronic | ICD-10-CM

## 2024-07-15 DIAGNOSIS — G47.33 OSA ON CPAP: Chronic | ICD-10-CM

## 2024-07-15 DIAGNOSIS — I25.118 CORONARY ARTERY DISEASE OF NATIVE ARTERY OF NATIVE HEART WITH STABLE ANGINA PECTORIS: ICD-10-CM

## 2024-07-15 DIAGNOSIS — I10 ESSENTIAL HYPERTENSION: Primary | Chronic | ICD-10-CM

## 2024-07-15 DIAGNOSIS — E78.00 HYPERCHOLESTEREMIA: ICD-10-CM

## 2024-07-15 DIAGNOSIS — R91.1 NODULE OF UPPER LOBE OF LEFT LUNG: ICD-10-CM

## 2024-07-15 PROCEDURE — 1160F RVW MEDS BY RX/DR IN RCRD: CPT | Performed by: NURSE PRACTITIONER

## 2024-07-15 PROCEDURE — 3079F DIAST BP 80-89 MM HG: CPT | Performed by: NURSE PRACTITIONER

## 2024-07-15 PROCEDURE — 1159F MED LIST DOCD IN RCRD: CPT | Performed by: NURSE PRACTITIONER

## 2024-07-15 PROCEDURE — 93000 ELECTROCARDIOGRAM COMPLETE: CPT | Performed by: NURSE PRACTITIONER

## 2024-07-15 PROCEDURE — 99214 OFFICE O/P EST MOD 30 MIN: CPT | Performed by: NURSE PRACTITIONER

## 2024-07-15 PROCEDURE — 3077F SYST BP >= 140 MM HG: CPT | Performed by: NURSE PRACTITIONER

## 2024-07-15 RX ORDER — FENOFIBRATE 145 MG/1
145 TABLET, COATED ORAL DAILY
Qty: 90 TABLET | Refills: 3 | Status: SHIPPED | OUTPATIENT
Start: 2024-07-15

## 2024-07-15 RX ORDER — ISOSORBIDE MONONITRATE 60 MG/1
60 TABLET, EXTENDED RELEASE ORAL EVERY EVENING
Qty: 90 TABLET | Refills: 3 | Status: SHIPPED | OUTPATIENT
Start: 2024-07-15

## 2024-07-15 RX ORDER — CARVEDILOL 6.25 MG/1
6.25 TABLET ORAL 2 TIMES DAILY WITH MEALS
Qty: 180 TABLET | Refills: 2 | Status: SHIPPED | OUTPATIENT
Start: 2024-07-15

## 2024-07-15 RX ORDER — ATORVASTATIN CALCIUM 20 MG/1
20 TABLET, FILM COATED ORAL NIGHTLY
Qty: 90 TABLET | Refills: 3 | Status: SHIPPED | OUTPATIENT
Start: 2024-07-15

## 2024-07-15 RX ORDER — RANOLAZINE 500 MG/1
500 TABLET, EXTENDED RELEASE ORAL 2 TIMES DAILY
Qty: 180 TABLET | Refills: 2 | Status: SHIPPED | OUTPATIENT
Start: 2024-07-15

## 2024-07-15 RX ORDER — LOSARTAN POTASSIUM 25 MG/1
25 TABLET ORAL DAILY
Qty: 90 TABLET | Refills: 3 | Status: SHIPPED | OUTPATIENT
Start: 2024-07-15

## 2024-07-15 NOTE — PROGRESS NOTES
Chief Complaint   Patient presents with    Follow-up     Cardiac management    Lab     Last labs 2-3 weeks ago per PCP. Reports having US of neck, having difficulty speaking. Had low dose CT of chest per Dr Naqvi.    Shortness of Breath     Only notices when walking in heat.     Med Refill     Needs refills on cardiac medications-90 day.     Sleep apnea     Wears CPAP at night, follows with Dr Naqvi.     Subjective       Sawyer Tilley is a 64 y.o. malewith HTN, hyperlipidemia, and IHD s/p bypass in 2011. Lexiscan on 7/22/2019 showed inferior lateral wall infarct with jhoana-infarct ischemia. Medical management was tried with Imdur increased. He had more chest pain, on 3/3/20 he underwent cardiac cath showing patent LIMA to LAD and PHYLLIS to D1, but both OM and PDA vein grafts occluded. He appeared to have collateralization.       He later noted enlargement of lung nodule referred back to Dr. Cm. CT and PET showed possible primary Lung CA with local metastasis. He underwent left VATS with small thoracotomy and wedge resection of left lung lesions. Pathology was consistent with granulomatous infection/likely histoplasmosis.  Treated with antifungal, followed by Dr. Richard Koch.     He presents today for follow-up.  Cardiac symptoms denied.  No chest pain, no increasing shortness of breath, no palpitations.  His main concern if hoarseness of voice, change in voice.  Ultrasound of neck and CT chest done.  Results pending.  He is compliant with CPAP.  He does admit to eating late in the day, soon before bedtime.  Lipids 7/2022 LDL 78, HDL 22, triglycerides 336.  A1c 6.2% 9/2023.         Cardiac History:    Past Surgical History:   Procedure Laterality Date    BACK SURGERY  02/2010    BRONCHOSCOPY N/A 07/09/2021    Procedure: BRONCHOSCOPY WITH ENDOBRONCHIAL ULTRASOUND;  Surgeon: Rudolph Cm MD;  Location: [facility] ENDOSCOPY;  Service: Cardiothoracic;  Laterality: N/A;  balloon intact     BRONCHOSCOPY N/A  07/23/2021    Procedure: BRONCHOSCOPY;  Surgeon: Rudolph Cm MD;  Location: [facility] OR;  Service: Cardiothoracic;  Laterality: N/A;    CARDIAC CATHETERIZATION  01/08/2011    Cath-60%LAD, 90%D1, 75%LCX, 80%OM. 100%RCA.    CARDIAC CATHETERIZATION  03/03/2020    Patent LIMA -LAD & PHYLLIS - D1. 100% SVG -OM & SVG-PDA.    CARDIOVASCULAR STRESS TEST  11/03/2011    Stress-4min, 41sec, 84%THR, 160/86. Inferior Ischemia    CARDIOVASCULAR STRESS TEST  09/30/2014    L.Myview-no ischemia, small fixed inferior infarct    CARDIOVASCULAR STRESS TEST  07/22/2019    L.Cardiolite- EF 57%. Inferolateral Infarct with periinfarct Ischemia.    CATARACT EXTRACTION Bilateral     COLONOSCOPY      CORONARY ARTERY BYPASS GRAFT  01/10/2011    LIMA-LAD, SVG-OM, SVG-PDA. PHYLLIS- D1    ECHO - CONVERTED  11/03/2011    Echo-EF 65/70%    ECHO - CONVERTED  09/30/2004    Echo-EF 65%, mild MR, borderline pulm HTN    ECHO - CONVERTED  07/22/2019    EF 60%. Mild- Mod MR. LA- 4.5 Cm    ENDOSCOPY      HYDROCELE EXCISION / REPAIR Left 2014    HYDROCELE EXCISION / REPAIR Right 11/2022    OTHER SURGICAL HISTORY  01/09/2011     Carotid US- No sig. stenosis.     THORACOSCOPY VIDEO ASSISTED WITH LOBECTOMY N/A 07/23/2021    Procedure: THORACOSCOPY VIDEO ASSISTED WITH WEDGE RESECTION;  Surgeon: Rudolph Cm MD;  Location: [facility];  Service: Cardiothoracic;  Laterality: N/A;     Current Outpatient Medications   Medication Sig Dispense Refill    albuterol (PROVENTIL) (2.5 MG/3ML) 0.083% nebulizer solution As Needed.      albuterol sulfate  (90 Base) MCG/ACT inhaler Every 4 (Four) Hours As Needed.      aspirin 325 MG tablet Take 1 tablet by mouth Daily.      atorvastatin (LIPITOR) 20 MG tablet Take 1 tablet by mouth Every Night. 90 tablet 3    carvedilol (COREG) 6.25 MG tablet Take 1 tablet by mouth 2 (Two) Times a Day With Meals. 180 tablet 2    fenofibrate (TRICOR) 145 MG tablet Take 1 tablet by mouth Daily. 90 tablet 3    isosorbide mononitrate (IMDUR)  60 MG 24 hr tablet Take 1 tablet by mouth Every Evening. 90 tablet 3    latanoprost (XALATAN) 0.005 % ophthalmic solution Administer 1 drop to both eyes Daily.      methocarbamol (ROBAXIN) 750 MG tablet Take 1 tablet by mouth Daily.      omeprazole (priLOSEC) 20 MG capsule Take 1 capsule by mouth Daily.      Probiotic Product (PROBIOTIC DAILY PO) Take  by mouth Daily.      ranolazine (RANEXA) 500 MG 12 hr tablet Take 1 tablet by mouth 2 (Two) Times a Day. 180 tablet 2    losartan (Cozaar) 25 MG tablet Take 1 tablet by mouth Daily. 90 tablet 3     No current facility-administered medications for this visit.     Chantix [varenicline tartrate] and Keflex [cephalexin]    Past Medical History:   Diagnosis Date    Anemia     Acute blood loss Anemia    Arthritis     back    Back pain     Chronic    Mireles's esophagus     Coronary artery disease 2011    NO STENTS; 4 graft CABG; MI before CABG; recent heart cath shows two grafts are blocked;     Dyslipidemia     GERD (gastroesophageal reflux disease)     H pylori ulcer     Positive    History of COVID-19 02/2022    History of kidney stones     one (passed)    Hx of cholecystectomy     Hx of hydrocele     Hyperlipidemia     Hypertension     IHD (ischemic heart disease)     s/p CABG X 4-V    Lung mass 02/2020    routine CT scan revealed mass in 2020; monitored regularly; recent CT scan 2021 showed additional abnormalities     Myocardial infarction 2011    Sleep apnea     wears CPAP    Thrombocytopenia     Tinnitus     Vitamin D deficiency      Social History     Socioeconomic History    Marital status:     Number of children: 2   Tobacco Use    Smoking status: Every Day     Current packs/day: 1.00     Average packs/day: 1 pack/day for 46.0 years (46.0 ttl pk-yrs)     Types: Cigarettes    Smokeless tobacco: Never   Vaping Use    Vaping status: Never Used   Substance and Sexual Activity    Alcohol use: No    Drug use: No    Sexual activity: Defer     Family History  "  Problem Relation Age of Onset    Hypertension Mother     Alzheimer's disease Mother     Hypertension Father     Alzheimer's disease Father     Hypertension Other      Review of Systems   Constitutional: Negative for decreased appetite and malaise/fatigue.   HENT: Negative.     Eyes:  Negative for blurred vision.   Cardiovascular:  Negative for chest pain, dyspnea on exertion, leg swelling, palpitations and syncope.   Respiratory:  Negative for shortness of breath and sleep disturbances due to breathing.    Endocrine: Negative.    Hematologic/Lymphatic: Negative for bleeding problem. Does not bruise/bleed easily.   Skin: Negative.    Musculoskeletal:  Negative for falls and myalgias.   Gastrointestinal:  Negative for abdominal pain, heartburn and melena.   Genitourinary:  Negative for hematuria.   Neurological:  Negative for dizziness and light-headedness.   Psychiatric/Behavioral:  Negative for altered mental status.    Allergic/Immunologic: Negative.       Objective     /80 (BP Location: Left arm)   Pulse 60   Ht 167.6 cm (65.98\")   Wt 93.6 kg (206 lb 6.4 oz)   BMI 33.33 kg/m²     Vitals and nursing note reviewed.   Constitutional:       General: Not in acute distress.     Appearance: Well-developed. Not diaphoretic.   Eyes:      Pupils: Pupils are equal, round, and reactive to light.   HENT:      Head: Normocephalic.   Pulmonary:      Effort: Pulmonary effort is normal. No respiratory distress.      Breath sounds: Normal breath sounds.   Cardiovascular:      Normal rate. Frequent ectopic beats. Regular rhythm.      Murmurs: There is a systolic murmur.   Pulses:     Intact distal pulses.   Edema:     Peripheral edema absent.   Abdominal:      General: Bowel sounds are normal.      Palpations: Abdomen is soft.   Musculoskeletal: Normal range of motion.      Cervical back: Normal range of motion. Skin:     General: Skin is warm and dry.   Neurological:      Mental Status: Alert and oriented to person, " place, and time.          ECG 12 Lead    Date/Time: 7/15/2024 5:14 PM  Performed by: Khushi Guzmán APRN    Authorized by: Khushi Guzmán APRN  Comparison: compared with previous ECG   Rhythm: sinus bradycardia and sinus arrhythmia  Ectopy: atrial premature contractions  Rate: bradycardic  BPM: 58  ST Segments: ST segments normal    Clinical impression: non-specific ECG  Comments:  ms  QRS 96 ms  QTc 384 ms              Problem List Items Addressed This Visit          Cardiac and Vasculature    CAD s/p CABG X4 (occluded vein grafts)  (Chronic)    Relevant Medications    carvedilol (COREG) 6.25 MG tablet    ranolazine (RANEXA) 500 MG 12 hr tablet    isosorbide mononitrate (IMDUR) 60 MG 24 hr tablet    Other Relevant Orders    ECG 12 Lead    Hypercholesteremia (Chronic)    Relevant Medications    fenofibrate (TRICOR) 145 MG tablet    atorvastatin (LIPITOR) 20 MG tablet    Essential hypertension - Primary (Chronic)    Relevant Medications    losartan (Cozaar) 25 MG tablet    carvedilol (COREG) 6.25 MG tablet       Pulmonary and Pneumonias    L VATS wedge 07/23/21    Overview     3cm spiculated VIRGINIA nodule, status post VATS thoracotomy with wedge resections of left upper and left lower lobes showing preliminarily inflammatory changes             Sleep    LLUVIA on CPAP (Chronic)       Tobacco    Smoker (Chronic)     CAD  -s/p CABG in 2011  -Most recent cath in 2020 showed patent LIMA and PHYLLIS with two occluded vein grafts with collateralization  -Denies anginal chest pain   -Continue aspirin, statin, beta blocker, Ranexa.   -Will plan to repeat stress test next year for 5-year follow-up    HTN  -Elevated at 150/80   -Add losartan 25 mg once daily   -Rhythm irregular today, EKG showed sinus with PACs.     Hypercholesterolemia  -managed with Lipitor and fenofibrate.    -Labs followed by PCP     Pulmonary fungal infection  -Treated with fluconazole, followed by ID  -Appears to be resolved     LLUVIA  -on CPAP     COPD  -he  has been unable to quit smoking.      Hoarseness in voice  -Continue PPI  -Avoid eating 4 hours before bedtime  -Planning to see ENT, ultrasound of neck and CT have been done.    BMI is >= 30 and <35. (Class 1 Obesity). The following options were offered after discussion;: nutrition counseling/recommendations     Sawyer Tilley  reports that he has been smoking cigarettes. He has a 46 pack-year smoking history. He has never used smokeless tobacco. I have educated him on the risk of diseases from using tobacco products such as cancer, COPD, and heart disease. I advised him to quit and he is not willing to quit. I spent 3  minutes counseling the patient.            Electronically signed by KATHLEEN Purcell,  July 16, 2024 17:17 EDT

## 2024-07-23 ENCOUNTER — TELEPHONE (OUTPATIENT)
Dept: CARDIOLOGY | Facility: CLINIC | Age: 64
End: 2024-07-23
Payer: MEDICARE

## 2024-07-23 NOTE — TELEPHONE ENCOUNTER
Received fax from Dr. Valderrama for cardiac clearance for patient to have a direct laryngoscopy with biopsy. Patient is on aspirin, they did not request to hold. According to our records, I do not see where patient has had any stenting. Patient had a CABG on 01/10/11.          Fax [phone]

## 2025-01-22 ENCOUNTER — OFFICE VISIT (OUTPATIENT)
Dept: CARDIOLOGY | Facility: CLINIC | Age: 65
End: 2025-01-22
Payer: MEDICARE

## 2025-01-22 VITALS
BODY MASS INDEX: 34.58 KG/M2 | SYSTOLIC BLOOD PRESSURE: 138 MMHG | HEIGHT: 66 IN | DIASTOLIC BLOOD PRESSURE: 70 MMHG | WEIGHT: 215.2 LBS | HEART RATE: 56 BPM

## 2025-01-22 DIAGNOSIS — I10 ESSENTIAL HYPERTENSION: Primary | ICD-10-CM

## 2025-01-22 DIAGNOSIS — I25.118 CORONARY ARTERY DISEASE OF NATIVE ARTERY OF NATIVE HEART WITH STABLE ANGINA PECTORIS: ICD-10-CM

## 2025-01-22 DIAGNOSIS — R06.02 SHORTNESS OF BREATH: ICD-10-CM

## 2025-01-22 DIAGNOSIS — E55.9 VITAMIN D DEFICIENCY: ICD-10-CM

## 2025-01-22 DIAGNOSIS — R73.9 HYPERGLYCEMIA: ICD-10-CM

## 2025-01-22 DIAGNOSIS — E78.00 HYPERCHOLESTEREMIA: ICD-10-CM

## 2025-01-22 DIAGNOSIS — R61 DIAPHORESIS: ICD-10-CM

## 2025-01-22 DIAGNOSIS — G47.33 OSA ON CPAP: ICD-10-CM

## 2025-01-22 PROCEDURE — 3075F SYST BP GE 130 - 139MM HG: CPT | Performed by: NURSE PRACTITIONER

## 2025-01-22 PROCEDURE — 99214 OFFICE O/P EST MOD 30 MIN: CPT | Performed by: NURSE PRACTITIONER

## 2025-01-22 PROCEDURE — 3078F DIAST BP <80 MM HG: CPT | Performed by: NURSE PRACTITIONER

## 2025-01-22 PROCEDURE — 1160F RVW MEDS BY RX/DR IN RCRD: CPT | Performed by: NURSE PRACTITIONER

## 2025-01-22 PROCEDURE — 1159F MED LIST DOCD IN RCRD: CPT | Performed by: NURSE PRACTITIONER

## 2025-01-22 RX ORDER — ISOSORBIDE MONONITRATE 60 MG/1
60 TABLET, EXTENDED RELEASE ORAL EVERY EVENING
Qty: 90 TABLET | Refills: 3 | Status: SHIPPED | OUTPATIENT
Start: 2025-01-22

## 2025-01-22 RX ORDER — ATORVASTATIN CALCIUM 20 MG/1
20 TABLET, FILM COATED ORAL NIGHTLY
Qty: 90 TABLET | Refills: 3 | Status: SHIPPED | OUTPATIENT
Start: 2025-01-22

## 2025-01-22 RX ORDER — LOSARTAN POTASSIUM 25 MG/1
25 TABLET ORAL DAILY
Qty: 90 TABLET | Refills: 3 | Status: SHIPPED | OUTPATIENT
Start: 2025-01-22

## 2025-01-22 RX ORDER — RANOLAZINE 500 MG/1
500 TABLET, EXTENDED RELEASE ORAL 2 TIMES DAILY
Qty: 180 TABLET | Refills: 2 | Status: SHIPPED | OUTPATIENT
Start: 2025-01-22

## 2025-01-22 RX ORDER — FENOFIBRATE 145 MG/1
145 TABLET, COATED ORAL DAILY
Qty: 90 TABLET | Refills: 3 | Status: SHIPPED | OUTPATIENT
Start: 2025-01-22

## 2025-01-22 RX ORDER — CARVEDILOL 6.25 MG/1
6.25 TABLET ORAL 2 TIMES DAILY WITH MEALS
Qty: 180 TABLET | Refills: 2 | Status: SHIPPED | OUTPATIENT
Start: 2025-01-22

## 2025-01-22 NOTE — PROGRESS NOTES
Chief Complaint   Patient presents with    Follow-up     Cardiac management    Lab     No current labs.    Sleep apnea     Continues to wear CPAP at night, follows with Dr Naqvi.    Cancer     Was dx with carcinoma of larynx, finished radiation in October. Stopped smoking in August.    Sweating     Has notice excessive sweating after taking shower, and with minimal exertion.    Shortness of Breath     Having when walking to mailbox, has slight incline.    Heart rate     Reports when [employer] nurse came to home, notice heart rate 48.    Med Refill     Needs refills on cardiac medications-90 day     Subjective       Sawyer Tilley is a 64 y.o. male with HTN, hyperlipidemia, and IHD s/p bypass in 2011. Lexiscan on 7/22/2019 showed inferior lateral wall infarct with jhoana-infarct ischemia. Medical management was tried with Imdur increased. He had more chest pain, on 3/3/20 he underwent cardiac cath showing patent LIMA to LAD and PHYLLIS to D1, but both OM and PDA vein grafts occluded. He appeared to have collateralization.       He later noted enlargement of lung nodule referred back to Dr. Cm. CT and PET showed possible primary Lung CA with local metastasis. He underwent left VATS with small thoracotomy and wedge resection of left lung lesions. Pathology was consistent with granulomatous infection/likely histoplasmosis.  Treated with antifungal, followed by Dr. Richard Koch.      He returns today for follow up. 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 Following closely with Dr. Valderrama, Dr. Graham. He denies cardiac symptoms. Only complaint is excessive sweating after showering. He is compliant with CPAP.  No recent labs available. Lipids 7/2022 LDL 78, HDL 22, triglycerides 336.  A1c 6.2% 9/2023. He quit smoking!       Cardiac History:    Past Surgical History:   Procedure Laterality Date    BACK SURGERY  02/2010     BRONCHOSCOPY N/A 07/09/2021    Procedure: BRONCHOSCOPY WITH ENDOBRONCHIAL ULTRASOUND;  Surgeon: Rudolph Cm MD;  Location:  [facility];  Service: Cardiothoracic;  Laterality: N/A;  balloon intact     BRONCHOSCOPY N/A 07/23/2021    Procedure: BRONCHOSCOPY;  Surgeon: Rudolph Cm MD;  Location:  [facility];  Service: Cardiothoracic;  Laterality: N/A;    CARDIAC CATHETERIZATION  01/08/2011    Cath-60%LAD, 90%D1, 75%LCX, 80%OM. 100%RCA.    CARDIAC CATHETERIZATION  03/03/2020    Patent LIMA -LAD & PHYLLIS - D1. 100% SVG -OM & SVG-PDA.    CARDIOVASCULAR STRESS TEST  11/03/2011    Stress-4min, 41sec, 84%THR, 160/86. Inferior Ischemia    CARDIOVASCULAR STRESS TEST  09/30/2014    L.Myview-no ischemia, small fixed inferior infarct    CARDIOVASCULAR STRESS TEST  07/22/2019    L.Cardiolite- EF 57%. Inferolateral Infarct with periinfarct Ischemia.    CATARACT EXTRACTION Bilateral     COLONOSCOPY      CORONARY ARTERY BYPASS GRAFT  01/10/2011    LIMA-LAD, SVG-OM, SVG-PDA. PHYLLIS- D1    ECHO - CONVERTED  11/03/2011    Echo-EF 65/70%    ECHO - CONVERTED  09/30/2004    Echo-EF 65%, mild MR, borderline pulm HTN    ECHO - CONVERTED  07/22/2019    EF 60%. Mild- Mod MR. LA- 4.5 Cm    ENDOSCOPY      HYDROCELE EXCISION / REPAIR Left 2014    HYDROCELE EXCISION / REPAIR Right 11/2022    OTHER SURGICAL HISTORY  01/09/2011     Carotid US- No sig. stenosis.     THORACOSCOPY VIDEO ASSISTED WITH LOBECTOMY N/A 07/23/2021    Procedure: THORACOSCOPY VIDEO ASSISTED WITH WEDGE RESECTION;  Surgeon: Rudolph Cm MD;  Location:  [facility];  Service: Cardiothoracic;  Laterality: N/A;     Current Outpatient Medications   Medication Sig Dispense Refill    albuterol (PROVENTIL) (2.5 MG/3ML) 0.083% nebulizer solution As Needed.      albuterol sulfate  (90 Base) MCG/ACT inhaler Every 4 (Four) Hours As Needed.      aspirin 325 MG tablet Take 1 tablet by mouth Daily.      atorvastatin (LIPITOR) 20 MG tablet Take 1 tablet by mouth Every Night. 90  tablet 3    carvedilol (COREG) 6.25 MG tablet Take 1 tablet by mouth 2 (Two) Times a Day With Meals. 180 tablet 2    fenofibrate (TRICOR) 145 MG tablet Take 1 tablet by mouth Daily. 90 tablet 3    isosorbide mononitrate (IMDUR) 60 MG 24 hr tablet Take 1 tablet by mouth Every Evening. 90 tablet 3    latanoprost (XALATAN) 0.005 % ophthalmic solution Administer 1 drop to both eyes Daily.      losartan (Cozaar) 25 MG tablet Take 1 tablet by mouth Daily. 90 tablet 3    methocarbamol (ROBAXIN) 750 MG tablet Take 1 tablet by mouth Daily.      omeprazole (priLOSEC) 20 MG capsule Take 1 capsule by mouth Daily.      Probiotic Product (PROBIOTIC DAILY PO) Take  by mouth Daily.      ranolazine (RANEXA) 500 MG 12 hr tablet Take 1 tablet by mouth 2 (Two) Times a Day. 180 tablet 2     No current facility-administered medications for this visit.     Chantix [varenicline tartrate] and Keflex [cephalexin]    Past Medical History:   Diagnosis Date    Anemia     Acute blood loss Anemia    Arthritis     back    Back pain     Chronic    Mireles's esophagus     Cancer 07/2024    carcinoma of larynx    Coronary artery disease 2011    NO STENTS; 4 graft CABG; MI before CABG; recent heart cath shows two grafts are blocked;     Dyslipidemia     GERD (gastroesophageal reflux disease)     H pylori ulcer     Positive    History of COVID-19 02/2022    History of kidney stones     one (passed)    Hx of cholecystectomy     Hx of hydrocele     Hyperlipidemia     Hypertension     IHD (ischemic heart disease)     s/p CABG X 4-V    Lung mass 02/2020    routine CT scan revealed mass in 2020; monitored regularly; recent CT scan 2021 showed additional abnormalities     Myocardial infarction 2011    Sleep apnea     wears CPAP    Thrombocytopenia     Tinnitus     Vitamin D deficiency      Social History     Socioeconomic History    Marital status:     Number of children: 2   Tobacco Use    Smoking status: Former     Current packs/day: 0.00      "Average packs/day: 1 pack/day for 46.0 years (46.0 ttl pk-yrs)     Types: Cigarettes     Quit date: 2024     Years since quittin.4     Passive exposure: Past    Smokeless tobacco: Never   Vaping Use    Vaping status: Never Used   Substance and Sexual Activity    Alcohol use: No    Drug use: No    Sexual activity: Defer     Family History   Problem Relation Age of Onset    Hypertension Mother     Alzheimer's disease Mother     Hypertension Father     Alzheimer's disease Father     Hypertension Other      Review of Systems   Constitutional: Positive for diaphoresis and weight gain (after quitting smoking). Negative for decreased appetite and malaise/fatigue.   HENT:  Positive for hoarse voice.    Eyes:  Negative for blurred vision.   Cardiovascular:  Negative for chest pain, dyspnea on exertion, leg swelling, palpitations and syncope.   Respiratory:  Negative for shortness of breath and sleep disturbances due to breathing.    Endocrine: Negative.    Hematologic/Lymphatic: Negative for bleeding problem. Does not bruise/bleed easily.   Skin: Negative.    Musculoskeletal:  Negative for falls and myalgias.   Gastrointestinal:  Negative for abdominal pain, heartburn and melena.   Genitourinary:  Negative for hematuria.   Neurological:  Negative for dizziness and light-headedness.   Psychiatric/Behavioral:  Negative for altered mental status.    Allergic/Immunologic: Negative.       Objective     /70 (BP Location: Right arm, Patient Position: Sitting)   Pulse 56   Ht 167.6 cm (65.98\")   Wt 97.6 kg (215 lb 3.2 oz)   BMI 34.75 kg/m²     Vitals and nursing note reviewed.   Constitutional:       General: Not in acute distress.     Appearance: Well-developed. Not diaphoretic.   Eyes:      Pupils: Pupils are equal, round, and reactive to light.   HENT:      Head: Normocephalic.   Pulmonary:      Effort: Pulmonary effort is normal. No respiratory distress.      Breath sounds: Normal breath sounds. "   Cardiovascular:      Normal rate. Regular rhythm.   Pulses:     Intact distal pulses.   Edema:     Peripheral edema absent.   Abdominal:      General: Bowel sounds are normal.      Palpations: Abdomen is soft.   Musculoskeletal: Normal range of motion.      Cervical back: Normal range of motion. Skin:     General: Skin is warm and dry.   Neurological:      Mental Status: Alert and oriented to person, place, and time.        Procedures          Problem List Items Addressed This Visit          Cardiac and Vasculature    CAD s/p CABG X4 (occluded vein grafts)  (Chronic)    Relevant Medications    carvedilol (COREG) 6.25 MG tablet    ranolazine (RANEXA) 500 MG 12 hr tablet    isosorbide mononitrate (IMDUR) 60 MG 24 hr tablet    Other Relevant Orders    Magnesium    Comprehensive Metabolic Panel    Hypercholesteremia (Chronic)    Relevant Medications    fenofibrate (TRICOR) 145 MG tablet    atorvastatin (LIPITOR) 20 MG tablet    Other Relevant Orders    Lipid Panel    Comprehensive Metabolic Panel    Essential hypertension - Primary (Chronic)    Relevant Medications    carvedilol (COREG) 6.25 MG tablet    losartan (Cozaar) 25 MG tablet    Other Relevant Orders    Vitamin B12    CBC & Differential    Comprehensive Metabolic Panel       Sleep    LLUVIA on CPAP (Chronic)    Relevant Orders    Magnesium    TSH    Comprehensive Metabolic Panel     Other Visit Diagnoses       Diaphoresis        Relevant Orders    Magnesium    Vitamin B12    TSH    CBC & Differential    Comprehensive Metabolic Panel    Vitamin D deficiency        Relevant Orders    Vitamin D,25-Hydroxy    Shortness of breath        Relevant Orders    Vitamin B12    TSH    CBC & Differential    Comprehensive Metabolic Panel    Hyperglycemia        Relevant Orders    Hemoglobin A1c           CAD  -s/p CABG in 2011  -Most recent cath in 2020 showed patent LIMA and PHYLLIS with two occluded vein grafts with collateralization  -Denies anginal chest pain   -Continue  aspirin, statin, beta blocker, Ranexa.   -since he remains asymptomatic, will plan to repeat stress test next visit for 5-year follow-up, after he heals from radiation      HTN  -better controlled after adding losartan 25 mg once daily      Hypercholesterolemia  -managed with Lipitor and fenofibrate.    -Labs followed by PCP, order given to take to PCP office     Pulmonary fungal infection  -Treated with fluconazole, followed by ID  -Appears to be resolved    Laryngeal cancer  -s/p radiation, completed in October, planning to have repeat PET  -follows with Dr. Valderrama, Dr. Graham      LLUVIA  -on CPAP     COPD  -he has completely quit smoking!     Sawyer Tilley  reports that he quit smoking about 5 months ago. His smoking use included cigarettes. He has a 46 pack-year smoking history. He has been exposed to tobacco smoke. He has never used smokeless tobacco.            Electronically signed by KATHLEEN Purcell,  January 24, 2025 11:28 EST

## 2025-01-27 RX ORDER — ERGOCALCIFEROL 1.25 MG/1
50000 CAPSULE, LIQUID FILLED ORAL WEEKLY
Qty: 9 CAPSULE | Refills: 1 | Status: SHIPPED | OUTPATIENT
Start: 2025-01-27

## 2025-02-18 RX ORDER — RANOLAZINE 500 MG/1
500 TABLET, EXTENDED RELEASE ORAL 2 TIMES DAILY
Qty: 180 TABLET | Refills: 3 | Status: SHIPPED | OUTPATIENT
Start: 2025-02-18

## 2025-07-29 ENCOUNTER — OFFICE VISIT (OUTPATIENT)
Dept: CARDIOLOGY | Facility: CLINIC | Age: 65
End: 2025-07-29
Payer: MEDICARE

## 2025-07-29 VITALS
WEIGHT: 204.6 LBS | HEART RATE: 64 BPM | BODY MASS INDEX: 32.88 KG/M2 | SYSTOLIC BLOOD PRESSURE: 130 MMHG | HEIGHT: 66 IN | DIASTOLIC BLOOD PRESSURE: 80 MMHG

## 2025-07-29 DIAGNOSIS — I25.118 CORONARY ARTERY DISEASE OF NATIVE ARTERY OF NATIVE HEART WITH STABLE ANGINA PECTORIS: ICD-10-CM

## 2025-07-29 DIAGNOSIS — E78.00 HYPERCHOLESTEREMIA: ICD-10-CM

## 2025-07-29 DIAGNOSIS — R06.02 SHORTNESS OF BREATH: ICD-10-CM

## 2025-07-29 DIAGNOSIS — I10 ESSENTIAL HYPERTENSION: Primary | ICD-10-CM

## 2025-07-29 DIAGNOSIS — E55.9 VITAMIN D DEFICIENCY: ICD-10-CM

## 2025-07-29 DIAGNOSIS — R73.9 HYPERGLYCEMIA: ICD-10-CM

## 2025-07-29 PROCEDURE — 1159F MED LIST DOCD IN RCRD: CPT | Performed by: NURSE PRACTITIONER

## 2025-07-29 PROCEDURE — 3079F DIAST BP 80-89 MM HG: CPT | Performed by: NURSE PRACTITIONER

## 2025-07-29 PROCEDURE — 3075F SYST BP GE 130 - 139MM HG: CPT | Performed by: NURSE PRACTITIONER

## 2025-07-29 PROCEDURE — 99214 OFFICE O/P EST MOD 30 MIN: CPT | Performed by: NURSE PRACTITIONER

## 2025-07-29 PROCEDURE — 1160F RVW MEDS BY RX/DR IN RCRD: CPT | Performed by: NURSE PRACTITIONER

## 2025-07-29 RX ORDER — CARVEDILOL 6.25 MG/1
6.25 TABLET ORAL 2 TIMES DAILY WITH MEALS
Qty: 180 TABLET | Refills: 2 | Status: SHIPPED | OUTPATIENT
Start: 2025-07-29

## 2025-07-29 RX ORDER — LEVOCETIRIZINE DIHYDROCHLORIDE 5 MG/1
5 TABLET, FILM COATED ORAL EVERY EVENING
Qty: 90 TABLET | Refills: 3 | Status: SHIPPED | OUTPATIENT
Start: 2025-07-29

## 2025-07-29 RX ORDER — ISOSORBIDE MONONITRATE 60 MG/1
60 TABLET, EXTENDED RELEASE ORAL EVERY EVENING
Qty: 90 TABLET | Refills: 3 | Status: SHIPPED | OUTPATIENT
Start: 2025-07-29

## 2025-07-29 RX ORDER — RANOLAZINE 500 MG/1
500 TABLET, EXTENDED RELEASE ORAL 2 TIMES DAILY
Qty: 180 TABLET | Refills: 3 | Status: SHIPPED | OUTPATIENT
Start: 2025-07-29

## 2025-07-29 RX ORDER — FENOFIBRATE 145 MG/1
145 TABLET, FILM COATED ORAL DAILY
Qty: 90 TABLET | Refills: 3 | Status: SHIPPED | OUTPATIENT
Start: 2025-07-29

## 2025-07-29 RX ORDER — MONTELUKAST SODIUM 10 MG/1
10 TABLET ORAL NIGHTLY
COMMUNITY

## 2025-07-29 RX ORDER — ATORVASTATIN CALCIUM 20 MG/1
20 TABLET, FILM COATED ORAL NIGHTLY
Qty: 90 TABLET | Refills: 3 | Status: SHIPPED | OUTPATIENT
Start: 2025-07-29

## 2025-07-29 RX ORDER — LOSARTAN POTASSIUM 25 MG/1
25 TABLET ORAL DAILY
Qty: 90 TABLET | Refills: 3 | Status: SHIPPED | OUTPATIENT
Start: 2025-07-29

## 2025-07-29 NOTE — PROGRESS NOTES
"Chief Complaint   Patient presents with    Follow-up     Cardiac management    Lab     Last labs in chart.     Shortness of Breath     Notices when walking to mailbox. Wears CPAP at night for sleep apnea. Has difficulty swallowing fluids, easily choked, relates to radiation.    Cramping     Has noticed in legs at night. He did take magnesium, states \"it did help\".    Med Refill     Needs refills on cardiac medications-90 day. Went over medications verbally.       Subjective     HPI    Sawyer Tilley is a 65 y.o. male with HTN, hyperlipidemia, and IHD s/p bypass in 2011. Lexiscan on 7/22/2019 showed inferior lateral wall infarct with jhoana-infarct ischemia. Medical management was tried with Imdur increased. He had more chest pain, on 3/3/20 he underwent cardiac cath showing patent LIMA to LAD and PHYLLIS to D1, but both OM and PDA vein grafts occluded. He appeared to have collateralization.       He later noted enlargement of lung nodule referred back to Dr. Cm. CT and PET showed possible primary Lung CA with local metastasis. He underwent left VATS with small thoracotomy and wedge resection of left lung lesions. Pathology was consistent with granulomatous infection/likely histoplasmosis.  Treated with antifungal, followed by Dr. Richard Koch.      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 Following closely with Dr. Valderrama, Dr. Graham.     He returns today for follow up. He denies cardiac symptoms. He has dysphagia and hoarseness post radiation. Swallow study abnormal, had therapy. He unfortunately resumed smoking. He is compliant with CPAP.  Labs 1/24/2025: CBC normal, glucose 120, BUN/CR 21/1.2, normal electrolytes and LFT, GFR 64, , , HDL 22, LDL 64, A1c 6.4%, vitamin D 19, mag 2.1, TSH 1.13, B12 486. Vitamin D 50,000 IU added.    Cardiac History:    Past Surgical History:   Procedure Laterality Date    BACK " SURGERY  02/2010    BRONCHOSCOPY N/A 07/09/2021    Procedure: BRONCHOSCOPY WITH ENDOBRONCHIAL ULTRASOUND;  Surgeon: Rudolph Cm MD;  Location:  [facility];  Service: Cardiothoracic;  Laterality: N/A;  balloon intact     BRONCHOSCOPY N/A 07/23/2021    Procedure: BRONCHOSCOPY;  Surgeon: Rudolph Cm MD;  Location:  [facility];  Service: Cardiothoracic;  Laterality: N/A;    CARDIAC CATHETERIZATION  01/08/2011    Cath-60%LAD, 90%D1, 75%LCX, 80%OM. 100%RCA.    CARDIAC CATHETERIZATION  03/03/2020    Patent LIMA -LAD & PHYLLIS - D1. 100% SVG -OM & SVG-PDA.    CARDIOVASCULAR STRESS TEST  11/03/2011    Stress-4min, 41sec, 84%THR, 160/86. Inferior Ischemia    CARDIOVASCULAR STRESS TEST  09/30/2014    L.Myview-no ischemia, small fixed inferior infarct    CARDIOVASCULAR STRESS TEST  07/22/2019    L.Cardiolite- EF 57%. Inferolateral Infarct with periinfarct Ischemia.    CATARACT EXTRACTION Bilateral     COLONOSCOPY      CORONARY ARTERY BYPASS GRAFT  01/10/2011    LIMA-LAD, SVG-OM, SVG-PDA. PHYLLIS- D1    ECHO - CONVERTED  11/03/2011    Echo-EF 65/70%    ECHO - CONVERTED  09/30/2004    Echo-EF 65%, mild MR, borderline pulm HTN    ECHO - CONVERTED  07/22/2019    EF 60%. Mild- Mod MR. LA- 4.5 Cm    ENDOSCOPY      HYDROCELE EXCISION / REPAIR Left 2014    HYDROCELE EXCISION / REPAIR Right 11/2022    OTHER SURGICAL HISTORY  01/09/2011     Carotid US- No sig. stenosis.     THORACOSCOPY VIDEO ASSISTED WITH LOBECTOMY N/A 07/23/2021    Procedure: THORACOSCOPY VIDEO ASSISTED WITH WEDGE RESECTION;  Surgeon: Rudolph Cm MD;  Location:  [facility];  Service: Cardiothoracic;  Laterality: N/A;     Current Outpatient Medications   Medication Sig Dispense Refill    albuterol (PROVENTIL) (2.5 MG/3ML) 0.083% nebulizer solution As Needed.      albuterol sulfate  (90 Base) MCG/ACT inhaler Every 4 (Four) Hours As Needed.      aspirin 325 MG tablet Take 1 tablet by mouth Daily.      atorvastatin (LIPITOR) 20 MG tablet Take 1 tablet by  mouth Every Night. 90 tablet 3    carvedilol (COREG) 6.25 MG tablet Take 1 tablet by mouth 2 (Two) Times a Day With Meals. 180 tablet 2    fenofibrate (TRICOR) 145 MG tablet Take 1 tablet by mouth Daily. 90 tablet 3    isosorbide mononitrate (IMDUR) 60 MG 24 hr tablet Take 1 tablet by mouth Every Evening. 90 tablet 3    latanoprost (XALATAN) 0.005 % ophthalmic solution Administer 1 drop to both eyes Daily.      losartan (Cozaar) 25 MG tablet Take 1 tablet by mouth Daily. 90 tablet 3    montelukast (SINGULAIR) 10 MG tablet Take 1 tablet by mouth Every Night.      omeprazole (priLOSEC) 20 MG capsule Take 1 capsule by mouth Daily.      ranolazine (RANEXA) 500 MG 12 hr tablet Take 1 tablet by mouth 2 (Two) Times a Day. 180 tablet 3    levocetirizine (XYZAL) 5 MG tablet Take 1 tablet by mouth Every Evening. 90 tablet 3     No current facility-administered medications for this visit.     Chantix [varenicline tartrate] and Keflex [cephalexin]    Past Medical History:   Diagnosis Date    Anemia     Acute blood loss Anemia    Arthritis     back    Back pain     Chronic    Mireles's esophagus     Cancer 07/2024    carcinoma of larynx    Coronary artery disease 2011    NO STENTS; 4 graft CABG; MI before CABG; recent heart cath shows two grafts are blocked;     Dyslipidemia     GERD (gastroesophageal reflux disease)     H pylori ulcer     Positive    History of COVID-19 02/2022    History of kidney stones     one (passed)    Hx of cholecystectomy     Hx of hydrocele     Hyperlipidemia     Hypertension     IHD (ischemic heart disease)     s/p CABG X 4-V    Lung mass 02/2020    routine CT scan revealed mass in 2020; monitored regularly; recent CT scan 2021 showed additional abnormalities     Myocardial infarction 2011    Sleep apnea     wears CPAP    Thrombocytopenia     Tinnitus     Vitamin D deficiency      Social History     Socioeconomic History    Marital status:     Number of children: 2   Tobacco Use    Smoking  "status: Every Day     Current packs/day: 1.00     Average packs/day: 1 pack/day for 46.3 years (46.3 ttl pk-yrs)     Types: Cigarettes     Start date: 04/2025     Last attempt to quit: 8/20/2024     Passive exposure: Current    Smokeless tobacco: Never   Vaping Use    Vaping status: Never Used   Substance and Sexual Activity    Alcohol use: No    Drug use: No    Sexual activity: Defer     Family History   Problem Relation Age of Onset    Hypertension Mother     Alzheimer's disease Mother     Hypertension Father     Alzheimer's disease Father     Hypertension Other      Review of Systems   Constitutional: Positive for diaphoresis and weight loss (-11). Negative for decreased appetite and malaise/fatigue.   HENT:  Positive for congestion and hoarse voice.    Eyes:  Negative for blurred vision.   Cardiovascular:  Negative for chest pain, dyspnea on exertion, leg swelling, palpitations and syncope.   Respiratory:  Positive for cough and wheezing. Negative for shortness of breath and sleep disturbances due to breathing.    Endocrine: Negative.    Hematologic/Lymphatic: Negative for bleeding problem. Does not bruise/bleed easily.   Skin: Negative.    Musculoskeletal:  Negative for falls and myalgias.   Gastrointestinal:  Positive for dysphagia. Negative for abdominal pain, heartburn and melena.   Genitourinary:  Negative for hematuria.   Neurological:  Negative for dizziness and light-headedness.   Psychiatric/Behavioral:  Negative for altered mental status.    Allergic/Immunologic: Negative.       Objective     /80 (BP Location: Right arm, Patient Position: Sitting)   Pulse 64   Ht 167.6 cm (65.98\")   Wt 92.8 kg (204 lb 9.6 oz)   BMI 33.04 kg/m²     Vitals and nursing note reviewed.   Constitutional:       General: Not in acute distress.     Appearance: Well-developed. Not diaphoretic.   Eyes:      Pupils: Pupils are equal, round, and reactive to light.   HENT:      Head: Normocephalic.   Pulmonary:      " Effort: Pulmonary effort is normal. No respiratory distress.      Breath sounds: Examination of the right-lower field reveals wheezing. Wheezing present.   Cardiovascular:      Normal rate. Regular rhythm.   Pulses:     Intact distal pulses.   Edema:     Peripheral edema absent.   Abdominal:      General: Bowel sounds are normal.      Palpations: Abdomen is soft.   Musculoskeletal: Normal range of motion.      Cervical back: Normal range of motion. Skin:     General: Skin is warm and dry.   Neurological:      Mental Status: Alert, oriented to person, place, and time and oriented to person, place and time.        Procedures          Problem List Items Addressed This Visit          Cardiac and Vasculature    CAD s/p CABG X4 (occluded vein grafts)  (Chronic)    Relevant Medications    carvedilol (COREG) 6.25 MG tablet    losartan (Cozaar) 25 MG tablet    ranolazine (RANEXA) 500 MG 12 hr tablet    fenofibrate (TRICOR) 145 MG tablet    atorvastatin (LIPITOR) 20 MG tablet    isosorbide mononitrate (IMDUR) 60 MG 24 hr tablet    Other Relevant Orders    CBC & Differential    Comprehensive Metabolic Panel    Hemoglobin A1c    Vitamin D,25-Hydroxy    Vitamin B12    TSH    Lipid Panel    Magnesium    Hypercholesteremia (Chronic)    Relevant Medications    fenofibrate (TRICOR) 145 MG tablet    atorvastatin (LIPITOR) 20 MG tablet    Other Relevant Orders    CBC & Differential    Comprehensive Metabolic Panel    Hemoglobin A1c    Vitamin D,25-Hydroxy    Vitamin B12    TSH    Lipid Panel    Magnesium    Essential hypertension - Primary (Chronic)    Relevant Medications    carvedilol (COREG) 6.25 MG tablet    losartan (Cozaar) 25 MG tablet    isosorbide mononitrate (IMDUR) 60 MG 24 hr tablet    Other Relevant Orders    CBC & Differential    Comprehensive Metabolic Panel    Hemoglobin A1c    Vitamin D,25-Hydroxy    Vitamin B12    TSH    Lipid Panel    Magnesium     Other Visit Diagnoses         Vitamin D deficiency        Relevant  Medications    fenofibrate (TRICOR) 145 MG tablet    atorvastatin (LIPITOR) 20 MG tablet    Other Relevant Orders    CBC & Differential    Comprehensive Metabolic Panel    Hemoglobin A1c    Vitamin D,25-Hydroxy    Vitamin B12    TSH    Lipid Panel    Magnesium      Shortness of breath        Relevant Orders    CBC & Differential    Comprehensive Metabolic Panel    Hemoglobin A1c    Vitamin D,25-Hydroxy    Vitamin B12    TSH    Lipid Panel    Magnesium      Hyperglycemia        Relevant Medications    losartan (Cozaar) 25 MG tablet    fenofibrate (TRICOR) 145 MG tablet    atorvastatin (LIPITOR) 20 MG tablet    Other Relevant Orders    CBC & Differential    Comprehensive Metabolic Panel    Hemoglobin A1c    Vitamin D,25-Hydroxy    Vitamin B12    TSH    Lipid Panel    Magnesium           CAD  -s/p CABG in 2011  -Most recent cath in 2020 showed patent LIMA and PHYLLIS with two occluded vein grafts with collateralization  -Denies anginal chest pain   -Continue aspirin, statin, beta blocker, Imdur, Ranexa.   -since he remains asymptomatic, he prefers to continue conservative management.       HTN  -well controlled   -continue Coreg, losartan 25 mg      Hypercholesterolemia  -managed with Lipitor and fenofibrate.    -LDL at goal  -Lab order given to take to PCP office     Pulmonary fungal infection  -Treated with fluconazole, followed by ID  -Appears to be resolved     Laryngeal cancer  -s/p radiation, completed in October 2024  -repeat PET scan apparently showed no reoccurrence  -follows with Dr. Valderrama, Dr. Graham      Sinus drainage, cough  -add Xyzal 5 mg daily     LLUVIA  -on CPAP     COPD  -he has unfortunately resumed smoking, encouraged again to quit.     BMI is >= 30 and <35. (Class 1 Obesity). The following options were offered after discussion;: nutrition counseling/recommendations    Sawyer Tilley  reports that he has been smoking cigarettes. He started smoking about 3 months ago. He has a 46.3 pack-year smoking  history. He has been exposed to tobacco smoke. He has never used smokeless tobacco.                Electronically signed by KATHLEEN Purcell,  July 30, 2025 10:52 EDT

## (undated) DEVICE — SIDESTREAM™ HIGH-EFFICIENCY NEBULIZER: Brand: AIRLIFE™

## (undated) DEVICE — Device: Brand: BALLOON

## (undated) DEVICE — SAFESECURE,SECUREMENT,FOLEY CATH,STERILE: Brand: MEDLINE

## (undated) DEVICE — PENCL ROCKRSWCH MEGADYNE W/HOLSTR 10FT SS

## (undated) DEVICE — SUT MNCRYL 3/0 SH 27 IN Y416H

## (undated) DEVICE — SYR LL TP 10ML STRL

## (undated) DEVICE — CLTH CLENS READYCLEANSE PERI CARE PK/5

## (undated) DEVICE — BOWL UTIL STRL 32OZ

## (undated) DEVICE — TOTAL TRAY, 16FR 10ML SIL FOLEY, URN: Brand: MEDLINE

## (undated) DEVICE — PATIENT RETURN ELECTRODE, SINGLE-USE, CONTACT QUALITY MONITORING, ADULT, WITH 9FT CORD, FOR PATIENTS WEIGING OVER 33LBS. (15KG): Brand: MEGADYNE

## (undated) DEVICE — SINGLE USE BIOPSY VALVE MAJ-210: Brand: SINGLE USE BIOPSY VALVE (STERILE)

## (undated) DEVICE — SINGLE USE SUCTION VALVE MAJ-209: Brand: SINGLE USE SUCTION VALVE (STERILE)

## (undated) DEVICE — GLV SURG BIOGEL LTX PF 7 1/2

## (undated) DEVICE — ECHELON FLEX 60 ARTICULATING ENDOSCOPIC LINEAR CUTTER (NO CARTRIDGE): Brand: ECHELON FLEX ENDOPATH

## (undated) DEVICE — Device

## (undated) DEVICE — SPEC CONT WIDE MOUTH 3OZ 400/CS 20 CS/SK: Brand: MEDEGEN MEDICAL PRODUCTS, LLC

## (undated) DEVICE — SUT VIC 2/0 CT2 27IN J269H

## (undated) DEVICE — SYR SLPTP 20CC

## (undated) DEVICE — TRAP,MUCUS SPECIMEN,40CC: Brand: MEDLINE

## (undated) DEVICE — LUER-LOK 360°: Brand: CONNECTA, LUER-LOK

## (undated) DEVICE — SUCTION CANISTER, 2500CC, RIGID: Brand: DEROYAL

## (undated) DEVICE — PK THORACOTOMY 10

## (undated) DEVICE — SUT VIC PLS 1 CTX 18IN VIL VCP365H

## (undated) DEVICE — SOL IRR NACL 0.9PCT BT 1000ML

## (undated) DEVICE — SYR SLP TP 10ML DISP

## (undated) DEVICE — ST NDL BRONCH ASP VIZISHOT 2 FLX 19GA

## (undated) DEVICE — DEFOGGER!" ANTI FOG KIT: Brand: DEROYAL

## (undated) DEVICE — GLV SURG SENSICARE PI MIC PF SZ7.5 LF STRL

## (undated) DEVICE — ANTIBACTERIAL UNDYED BRAIDED (POLYGLACTIN 910), SYNTHETIC ABSORBABLE SURGICAL SUTURE: Brand: COATED VICRYL

## (undated) DEVICE — THE BITE BLOCK MAXI, LATEX FREE STRAP IS USED TO PROTECT THE ENDOSCOPE INSERTION TUBE FROM BEING BITTEN BY THE PATIENT.

## (undated) DEVICE — SCOPE WARMER THAT PRE-WARMS MULTIPLE LAPAROSCOPES.: Brand: JOSNOE MEDICAL INC

## (undated) DEVICE — ST EXT MICROBORE FIX M LL 38IN

## (undated) DEVICE — ELECTRD BLD EZ CLN MOD XLNG 2.75IN

## (undated) DEVICE — LINEAR ADAPTER SHORT: Brand: SIGNIA

## (undated) DEVICE — VISUALIZATION SYSTEM: Brand: CLEARIFY

## (undated) DEVICE — CANNULA,OXY,ADULT,SUPERSOFT,W/7'TUB,UC: Brand: MEDLINE

## (undated) DEVICE — UNDERGLV SURG BIOGEL INDICAT PI SZ8 BLU

## (undated) DEVICE — 2, DISPOSABLE SUCTION/IRRIGATOR WITHOUT DISPOSABLE TIP: Brand: STRYKEFLOW

## (undated) DEVICE — ELECTRD BLD EZ CLN STD 6.5IN

## (undated) DEVICE — 28 FR STRAIGHT – SOFT PVC CATHETER: Brand: PVC THORACIC CATHETERS

## (undated) DEVICE — SUT MNCRYL PLS ANTIB UD 4/0 PS2 18IN